# Patient Record
Sex: FEMALE | Race: WHITE | NOT HISPANIC OR LATINO | Employment: FULL TIME | URBAN - METROPOLITAN AREA
[De-identification: names, ages, dates, MRNs, and addresses within clinical notes are randomized per-mention and may not be internally consistent; named-entity substitution may affect disease eponyms.]

---

## 2017-01-17 DIAGNOSIS — E78.5 HYPERLIPIDEMIA, UNSPECIFIED: Primary | ICD-10-CM

## 2017-01-25 LAB
ALBUMIN SERPL-MCNC: 4.3 G/DL (ref 3.5–5.2)
ALBUMIN/GLOB SERPL: 1.7 G/DL
ALP SERPL-CCNC: 99 U/L (ref 39–117)
ALT SERPL-CCNC: 35 U/L (ref 1–33)
AST SERPL-CCNC: 27 U/L (ref 1–32)
BASOPHILS # BLD AUTO: 0.03 10*3/MM3 (ref 0–0.2)
BASOPHILS NFR BLD AUTO: 0.8 % (ref 0–1.5)
BILIRUB SERPL-MCNC: 0.6 MG/DL (ref 0.1–1.2)
BUN SERPL-MCNC: 12 MG/DL (ref 6–20)
BUN/CREAT SERPL: 14.6 (ref 7–25)
CALCIUM SERPL-MCNC: 9.9 MG/DL (ref 8.6–10.5)
CHLORIDE SERPL-SCNC: 103 MMOL/L (ref 98–107)
CHOLEST SERPL-MCNC: 184 MG/DL (ref 0–200)
CO2 SERPL-SCNC: 29.8 MMOL/L (ref 22–29)
CREAT SERPL-MCNC: 0.82 MG/DL (ref 0.57–1)
EOSINOPHIL # BLD AUTO: 0.09 10*3/MM3 (ref 0–0.7)
EOSINOPHIL NFR BLD AUTO: 2.3 % (ref 0.3–6.2)
ERYTHROCYTE [DISTWIDTH] IN BLOOD BY AUTOMATED COUNT: 12.9 % (ref 11.7–13)
GLOBULIN SER CALC-MCNC: 2.5 GM/DL
GLUCOSE SERPL-MCNC: 103 MG/DL (ref 65–99)
HCT VFR BLD AUTO: 45.2 % (ref 35.6–45.5)
HDLC SERPL-MCNC: 55 MG/DL (ref 40–60)
HGB BLD-MCNC: 14.6 G/DL (ref 11.9–15.5)
IMM GRANULOCYTES # BLD: 0 10*3/MM3 (ref 0–0.03)
IMM GRANULOCYTES NFR BLD: 0 % (ref 0–0.5)
LDLC SERPL CALC-MCNC: 107 MG/DL (ref 0–100)
LDLC/HDLC SERPL: 1.95 {RATIO}
LYMPHOCYTES # BLD AUTO: 1.46 10*3/MM3 (ref 0.9–4.8)
LYMPHOCYTES NFR BLD AUTO: 38 % (ref 19.6–45.3)
MCH RBC QN AUTO: 30.6 PG (ref 26.9–32)
MCHC RBC AUTO-ENTMCNC: 32.3 G/DL (ref 32.4–36.3)
MCV RBC AUTO: 94.8 FL (ref 80.5–98.2)
MONOCYTES # BLD AUTO: 0.42 10*3/MM3 (ref 0.2–1.2)
MONOCYTES NFR BLD AUTO: 10.9 % (ref 5–12)
NEUTROPHILS # BLD AUTO: 1.84 10*3/MM3 (ref 1.9–8.1)
NEUTROPHILS NFR BLD AUTO: 48 % (ref 42.7–76)
NRBC BLD AUTO-RTO: 0 /100 WBC (ref 0–0)
PLATELET # BLD AUTO: 198 10*3/MM3 (ref 140–500)
POTASSIUM SERPL-SCNC: 4.5 MMOL/L (ref 3.5–5.2)
PROT SERPL-MCNC: 6.8 G/DL (ref 6–8.5)
RBC # BLD AUTO: 4.77 10*6/MM3 (ref 3.9–5.2)
SODIUM SERPL-SCNC: 143 MMOL/L (ref 136–145)
TRIGL SERPL-MCNC: 110 MG/DL (ref 0–150)
TSH SERPL DL<=0.005 MIU/L-ACNC: 2.62 MIU/ML (ref 0.27–4.2)
VLDLC SERPL CALC-MCNC: 22 MG/DL (ref 5–40)
WBC # BLD AUTO: 3.84 10*3/MM3 (ref 4.5–10.7)

## 2017-02-01 ENCOUNTER — OFFICE VISIT (OUTPATIENT)
Dept: FAMILY MEDICINE CLINIC | Facility: CLINIC | Age: 52
End: 2017-02-01

## 2017-02-01 VITALS
OXYGEN SATURATION: 98 % | HEIGHT: 63 IN | HEART RATE: 67 BPM | BODY MASS INDEX: 26.72 KG/M2 | WEIGHT: 150.8 LBS | TEMPERATURE: 98.3 F | DIASTOLIC BLOOD PRESSURE: 70 MMHG | SYSTOLIC BLOOD PRESSURE: 110 MMHG | RESPIRATION RATE: 16 BRPM

## 2017-02-01 DIAGNOSIS — E78.5 HYPERLIPIDEMIA, UNSPECIFIED HYPERLIPIDEMIA TYPE: Primary | ICD-10-CM

## 2017-02-01 PROCEDURE — 99213 OFFICE O/P EST LOW 20 MIN: CPT | Performed by: INTERNAL MEDICINE

## 2017-02-01 RX ORDER — NAPROXEN 375 MG/1
TABLET ORAL
COMMUNITY
Start: 2016-11-04 | End: 2017-02-01

## 2017-02-01 RX ORDER — CHLORHEXIDINE GLUCONATE 0.12 MG/ML
RINSE ORAL
COMMUNITY
Start: 2016-11-16 | End: 2017-02-01

## 2017-02-01 RX ORDER — ATORVASTATIN CALCIUM 10 MG/1
10 TABLET, FILM COATED ORAL DAILY
Qty: 90 TABLET | Refills: 3 | Status: SHIPPED | OUTPATIENT
Start: 2017-02-01 | End: 2017-06-15 | Stop reason: SDUPTHER

## 2017-02-01 NOTE — PATIENT INSTRUCTIONS
Your blood pressure is excellent.  Total cholesterol is 180/normal HDL cholesterol 55.  Your cholesterol is slightly elevated at 107.  Resting blood sugar is also slightly elevated at 103.  Discussed diet, exercise per AHA guidelines.  Continue atorvastatin 10 mg daily and follow-up annually with labs.  To get discussed increasing fiber and fluid intake for constipation per fiber sheet.  I would also like to have a copy of your next bone density test done by her gynecologist.

## 2017-02-01 NOTE — PROGRESS NOTES
Subjective   Maricarmen Hayes is a 52 y.o. female. Patient is here today for   Chief Complaint   Patient presents with   • Follow-up     labs           Vitals:    02/01/17 0941   BP: 110/70   Pulse: 67   Resp: 16   Temp: 98.3 °F (36.8 °C)   SpO2: 98%       The following portions of the patient's history were reviewed and updated as appropriate: allergies, current medications, past family history, past medical history, past social history, past surgical history and problem list.    Past Medical History   Diagnosis Date   • Abnormal Pap smear of cervix    • Anemia    • Hyperlipidemia    • Ovarian cyst    • Urinary tract infection       No Known Allergies   Social History     Social History   • Marital status:      Spouse name: N/A   • Number of children: N/A   • Years of education: N/A     Occupational History   • Not on file.     Social History Main Topics   • Smoking status: Never Smoker   • Smokeless tobacco: Never Used   • Alcohol use Yes      Comment: occasional    • Drug use: No   • Sexual activity: Yes     Partners: Male     Birth control/ protection: Surgical     Other Topics Concern   • Not on file     Social History Narrative        Current Outpatient Prescriptions:   •  atorvastatin (LIPITOR) 10 MG tablet, Take 1 tablet by mouth Daily., Disp: 90 tablet, Rfl: 3     Objective   History of Present Illness Shantanu is here today for an annual follow-up.  She has hyperlipidemia and is on atorvastatin 10 mg daily.  She feels well.  She eats very healthy and is very active.  She has a fit bit and records over 10,000 steps a day.  Her weight is unchanged in the past year.  She recently got a TDap and a flu vaccine.  She does complain of some constipation and has started taking Metamucil tablets.    Review of Systems   Constitutional: Negative for activity change and unexpected weight change.   Respiratory: Negative.    Cardiovascular: Negative.    Gastrointestinal: Positive for constipation.        Colonoscopy May  2016   Neurological: Negative.    Psychiatric/Behavioral: Negative.        Physical Exam   Constitutional: She appears well-developed and well-nourished.   Neck: Neck supple. No thyromegaly present.   Cardiovascular: Normal rate, regular rhythm and normal heart sounds.    Pulmonary/Chest: Effort normal and breath sounds normal.   Neurological: She is alert.   Psychiatric: She has a normal mood and affect.   Vitals reviewed.      ASSESSMENT     Problem List Items Addressed This Visit        Cardiovascular and Mediastinum    Hyperlipidemia - Primary    Relevant Medications    atorvastatin (LIPITOR) 10 MG tablet          PLAN  Patient Instructions   Your blood pressure is excellent.  Total cholesterol is 180/normal HDL cholesterol 55.  Your cholesterol is slightly elevated at 107.  Resting blood sugar is also slightly elevated at 103.  Discussed diet, exercise per AHA guidelines.  Continue atorvastatin 10 mg daily and follow-up annually with labs.  To get discussed increasing fiber and fluid intake for constipation per fiber sheet.  I would also like to have a copy of your next bone density test done by her gynecologist.      No Follow-up on file.

## 2017-03-21 RX ORDER — ATORVASTATIN CALCIUM 10 MG/1
TABLET, FILM COATED ORAL
Qty: 90 TABLET | Refills: 0 | Status: SHIPPED | OUTPATIENT
Start: 2017-03-21 | End: 2017-06-15 | Stop reason: SDUPTHER

## 2017-06-15 RX ORDER — ATORVASTATIN CALCIUM 10 MG/1
TABLET, FILM COATED ORAL
Qty: 90 TABLET | Refills: 0 | Status: SHIPPED | OUTPATIENT
Start: 2017-06-15 | End: 2017-09-21 | Stop reason: SDUPTHER

## 2017-07-17 ENCOUNTER — OFFICE VISIT (OUTPATIENT)
Dept: OBSTETRICS AND GYNECOLOGY | Facility: CLINIC | Age: 52
End: 2017-07-17

## 2017-07-17 VITALS
WEIGHT: 155 LBS | DIASTOLIC BLOOD PRESSURE: 70 MMHG | BODY MASS INDEX: 27.46 KG/M2 | HEIGHT: 63 IN | SYSTOLIC BLOOD PRESSURE: 110 MMHG

## 2017-07-17 DIAGNOSIS — Z12.4 PAP SMEAR FOR CERVICAL CANCER SCREENING: ICD-10-CM

## 2017-07-17 DIAGNOSIS — Z13.9 SCREENING: ICD-10-CM

## 2017-07-17 DIAGNOSIS — E78.01 FAMILIAL HYPERCHOLESTEROLEMIA: ICD-10-CM

## 2017-07-17 DIAGNOSIS — Z12.31 SCREENING MAMMOGRAM, ENCOUNTER FOR: ICD-10-CM

## 2017-07-17 DIAGNOSIS — Z01.419 WELL FEMALE EXAM WITH ROUTINE GYNECOLOGICAL EXAM: Primary | ICD-10-CM

## 2017-07-17 LAB
BILIRUB BLD-MCNC: NEGATIVE MG/DL
CLARITY, POC: CLEAR
COLOR UR: YELLOW
GLUCOSE UR STRIP-MCNC: NEGATIVE MG/DL
KETONES UR QL: NEGATIVE
LEUKOCYTE EST, POC: NEGATIVE
NITRITE UR-MCNC: NEGATIVE MG/ML
PH UR: 7 [PH] (ref 5–8)
PROT UR STRIP-MCNC: NEGATIVE MG/DL
RBC # UR STRIP: NEGATIVE /UL
SP GR UR: 1.01 (ref 1–1.03)
UROBILINOGEN UR QL: NORMAL

## 2017-07-17 PROCEDURE — 99396 PREV VISIT EST AGE 40-64: CPT | Performed by: OBSTETRICS & GYNECOLOGY

## 2017-07-17 PROCEDURE — 81002 URINALYSIS NONAUTO W/O SCOPE: CPT | Performed by: OBSTETRICS & GYNECOLOGY

## 2017-07-17 NOTE — PROGRESS NOTES
[facility]  Routine Annual Visit    2017    Patient: Maricarmen Hayes          MR#:[id number]      History of Present Illness    52 y.o. female  who presents for annual exam.    No LMP recorded (lmp unknown). Patient has had a hysterectomy.  Obstetric History:  OB History      Para Term  AB TAB SAB Ectopic Multiple Living    3 2 2  1   1  2         Menstrual History:  Menarche age: 13 years  No LMP recorded (lmp unknown). Patient has had a hysterectomy.  Period Cycle (Days): 28  Period Duration (Days): 7  Period Pattern: Regular  Menstrual Flow: Moderate  Menstrual Control: Maxi pad    Sexual History:       ________________________________________  Patient Active Problem List   Diagnosis   • Discoloration of skin of hand   • Hyperlipidemia   • Well female exam with routine gynecological exam       Past Medical History:   Diagnosis Date   • Abnormal Pap smear of cervix    • Anemia    • Hyperlipidemia        Past Surgical History:   Procedure Laterality Date   • BILATERAL SALPINGO OOPHORECTOMY  2015    Dr. Reynolds   •  SECTION     • COLONOSCOPY N/A 2016    Procedure: COLONOSCOPY;  Surgeon: Devon Campos MD;  Location: [facility] ENDOSCOPY;  Service:    • HYSTERECTOMY  2015    Dr. Reynolds   • TUBAL ABDOMINAL LIGATION         History   Smoking Status   • Never Smoker   Smokeless Tobacco   • Never Used       Family History   Problem Relation Age of Onset   • Rheum arthritis Maternal Aunt    • Hyperlipidemia Maternal Aunt    • Alzheimer's disease Paternal Grandmother    • Hyperlipidemia Maternal Grandmother    • Hyperlipidemia Mother    • Hyperlipidemia Maternal Aunt        Prior to Admission medications    Medication Sig Start Date End Date Taking? Authorizing Provider   atorvastatin (LIPITOR) 10 MG tablet TAKE ONE TABLET BY MOUTH DAILY 6/15/17   Yordy Mishra MD     ________________________________________    Current contraception: status post  "hysterectomy  History of abnormal Pap smear: yes - remote past  Family history of uterine or ovarian cancer: no  Family History of colon cancer/colon polyps: no  History of abnormal mammogram: no  History of abnormal lipids: yes - treated    The following portions of the patient's history were reviewed and updated as appropriate: allergies, current medications, past family history, past medical history, past social history, past surgical history and problem list.    Review of Systems    Pertinent items are noted in HPI.     Objective   Physical Exam    /70  Ht 63\" (160 cm)  Wt 155 lb (70.3 kg)  LMP  (LMP Unknown)  Breastfeeding? No  BMI 27.46 kg/m2   BP Readings from Last 3 Encounters:   07/17/17 110/70   02/01/17 110/70   05/24/16 122/77      Wt Readings from Last 3 Encounters:   07/17/17 155 lb (70.3 kg)   02/01/17 150 lb 12.8 oz (68.4 kg)   05/24/16 148 lb (67.1 kg)        BMI: Estimated body mass index is 27.46 kg/(m^2) as calculated from the following:    Height as of this encounter: 63\" (160 cm).    Weight as of this encounter: 155 lb (70.3 kg).    General:   alert, appears stated age and cooperative   Heart: regular rate and rhythm, S1, S2 normal, no murmur, click, rub or gallop   Lungs: clear to auscultation bilaterally   Abdomen: soft, non-tender, without masses or organomegaly   Breast: inspection negative, no nipple discharge or bleeding, no masses or nodularity palpable   Vulva: normal   Vagina: normal mucosa   Cervix: no lesions   Uterus: absent    Adnexa: normal adnexa     As part of wellness and prevention, the following topics were discussed with the patient:    []  Nutrition  [x]  Physical activity/regular exercise   [x]  Healthy weight  []  Injury prevention  []  Substance misuse/abuse  []  Sexual behavior  []  STD prevention  []  Contaception  []  Dental health  []  Mental health  []  Immunization  [x]  Encouraged SBE       Assessment:    Maricarmen was seen today for gynecologic " exam.    Diagnoses and all orders for this visit:    Well female exam with routine gynecological exam    Pap smear for cervical cancer screening  -     IgP, Aptima HPV    Screening  -     POC Urinalysis Dipstick    Familial hypercholesterolemia    Screening mammogram, encounter for  -     Mammo Screening Bilateral With CAD; Future          Plan:    []  Rx:   [x]  Mammogram request made  [x]  PAP done  []  Occult fecal blood test ([employer])  []  Labs:   []  GC/Chl/TV  []  DEXA scan   [x]  Colonoscopy to date            Vern Reynolds MD  7/17/2017 11:56 AM

## 2017-07-20 ENCOUNTER — TELEPHONE (OUTPATIENT)
Dept: OBSTETRICS AND GYNECOLOGY | Facility: CLINIC | Age: 52
End: 2017-07-20

## 2017-07-20 LAB
CYTOLOGIST CVX/VAG CYTO: NORMAL
CYTOLOGY CVX/VAG DOC THIN PREP: NORMAL
DX ICD CODE: NORMAL
HIV 1 & 2 AB SER-IMP: NORMAL
HPV I/H RISK 4 DNA CVX QL PROBE+SIG AMP: NEGATIVE
OTHER STN SPEC: NORMAL
PATH REPORT.FINAL DX SPEC: NORMAL
STAT OF ADQ CVX/VAG CYTO-IMP: NORMAL

## 2017-07-20 NOTE — TELEPHONE ENCOUNTER
----- Message from Vern Reynolds MD sent at 7/20/2017  8:26 AM EDT -----  Call pt:  PAP is negative.

## 2017-08-29 ENCOUNTER — TELEPHONE (OUTPATIENT)
Dept: OBSTETRICS AND GYNECOLOGY | Facility: CLINIC | Age: 52
End: 2017-08-29

## 2017-08-30 ENCOUNTER — HOSPITAL ENCOUNTER (OUTPATIENT)
Dept: MAMMOGRAPHY | Facility: HOSPITAL | Age: 52
Discharge: HOME | End: 2017-08-30
Attending: OBSTETRICS & GYNECOLOGY | Admitting: OBSTETRICS & GYNECOLOGY

## 2017-08-30 DIAGNOSIS — Z12.31 SCREENING MAMMOGRAM, ENCOUNTER FOR: ICD-10-CM

## 2017-08-30 PROCEDURE — G0202 SCR MAMMO BI INCL CAD: HCPCS

## 2017-09-08 ENCOUNTER — TELEPHONE (OUTPATIENT)
Dept: OBSTETRICS AND GYNECOLOGY | Facility: CLINIC | Age: 52
End: 2017-09-08

## 2017-09-08 NOTE — TELEPHONE ENCOUNTER
----- Message from Vern Reynolds MD sent at 9/7/2017  9:49 PM EDT -----  Call patient: Normal mammogram

## 2017-09-21 RX ORDER — ATORVASTATIN CALCIUM 10 MG/1
TABLET, FILM COATED ORAL
Qty: 90 TABLET | Refills: 0 | Status: SHIPPED | OUTPATIENT
Start: 2017-09-21 | End: 2017-12-27 | Stop reason: SDUPTHER

## 2017-12-27 RX ORDER — ATORVASTATIN CALCIUM 10 MG/1
TABLET, FILM COATED ORAL
Qty: 30 TABLET | Refills: 0 | Status: SHIPPED | OUTPATIENT
Start: 2017-12-27 | End: 2018-02-01 | Stop reason: SDUPTHER

## 2018-01-24 DIAGNOSIS — R73.01 ELEVATED FASTING GLUCOSE: ICD-10-CM

## 2018-01-24 DIAGNOSIS — E78.5 HYPERLIPIDEMIA, UNSPECIFIED HYPERLIPIDEMIA TYPE: ICD-10-CM

## 2018-01-24 DIAGNOSIS — Z11.59 NEED FOR HEPATITIS C SCREENING TEST: Primary | ICD-10-CM

## 2018-01-26 LAB
ALBUMIN SERPL-MCNC: 4.4 G/DL (ref 3.5–5.2)
ALBUMIN/GLOB SERPL: 1.6 G/DL
ALP SERPL-CCNC: 94 U/L (ref 39–117)
ALT SERPL-CCNC: 22 U/L (ref 1–33)
AST SERPL-CCNC: 21 U/L (ref 1–32)
BASOPHILS # BLD AUTO: 0.01 10*3/MM3 (ref 0–0.2)
BASOPHILS NFR BLD AUTO: 0.3 % (ref 0–1.5)
BILIRUB SERPL-MCNC: 0.7 MG/DL (ref 0.1–1.2)
BUN SERPL-MCNC: 14 MG/DL (ref 6–20)
BUN/CREAT SERPL: 16.7 (ref 7–25)
CALCIUM SERPL-MCNC: 9.9 MG/DL (ref 8.6–10.5)
CHLORIDE SERPL-SCNC: 99 MMOL/L (ref 98–107)
CHOLEST SERPL-MCNC: 170 MG/DL (ref 0–200)
CO2 SERPL-SCNC: 28.9 MMOL/L (ref 22–29)
CREAT SERPL-MCNC: 0.84 MG/DL (ref 0.57–1)
DIFFERENTIAL COMMENT: NORMAL
EOSINOPHIL # BLD AUTO: 0.07 10*3/MM3 (ref 0–0.7)
EOSINOPHIL NFR BLD AUTO: 2 % (ref 0.3–6.2)
ERYTHROCYTE [DISTWIDTH] IN BLOOD BY AUTOMATED COUNT: 12.9 % (ref 11.7–13)
GFR SERPLBLD CREATININE-BSD FMLA CKD-EPI: 71 ML/MIN/1.73
GFR SERPLBLD CREATININE-BSD FMLA CKD-EPI: 86 ML/MIN/1.73
GLOBULIN SER CALC-MCNC: 2.8 GM/DL
GLUCOSE SERPL-MCNC: 105 MG/DL (ref 65–99)
HCT VFR BLD AUTO: 44.7 % (ref 35.6–45.5)
HCV AB S/CO SERPL IA: 0.2 S/CO RATIO (ref 0–0.9)
HCV AB SERPL QL IA: NORMAL
HDLC SERPL-MCNC: 54 MG/DL (ref 40–60)
HGB BLD-MCNC: 14.8 G/DL (ref 11.9–15.5)
IMM GRANULOCYTES # BLD: 0 10*3/MM3 (ref 0–0.03)
IMM GRANULOCYTES NFR BLD: 0 % (ref 0–0.5)
LDLC SERPL CALC-MCNC: 92 MG/DL (ref 0–100)
LDLC/HDLC SERPL: 1.71 {RATIO}
LYMPHOCYTES # BLD AUTO: 1.42 10*3/MM3 (ref 0.9–4.8)
LYMPHOCYTES NFR BLD AUTO: 40.5 % (ref 19.6–45.3)
MCH RBC QN AUTO: 30.8 PG (ref 26.9–32)
MCHC RBC AUTO-ENTMCNC: 33.1 G/DL (ref 32.4–36.3)
MCV RBC AUTO: 93.1 FL (ref 80.5–98.2)
MONOCYTES # BLD AUTO: 0.39 10*3/MM3 (ref 0.2–1.2)
MONOCYTES NFR BLD AUTO: 11.1 % (ref 5–12)
NEUTROPHILS # BLD AUTO: 1.62 10*3/MM3 (ref 1.9–8.1)
NEUTROPHILS NFR BLD AUTO: 46.1 % (ref 42.7–76)
NRBC BLD AUTO-RTO: 0 /100 WBC (ref 0–0)
PLATELET # BLD AUTO: 204 10*3/MM3 (ref 140–500)
PLATELET BLD QL SMEAR: NORMAL
POTASSIUM SERPL-SCNC: 4.6 MMOL/L (ref 3.5–5.2)
PROT SERPL-MCNC: 7.2 G/DL (ref 6–8.5)
RBC # BLD AUTO: 4.8 10*6/MM3 (ref 3.9–5.2)
RBC MORPH BLD: NORMAL
SODIUM SERPL-SCNC: 142 MMOL/L (ref 136–145)
TRIGL SERPL-MCNC: 118 MG/DL (ref 0–150)
VLDLC SERPL CALC-MCNC: 23.6 MG/DL (ref 5–40)
WBC # BLD AUTO: 3.51 10*3/MM3 (ref 4.5–10.7)

## 2018-02-01 ENCOUNTER — OFFICE VISIT (OUTPATIENT)
Dept: FAMILY MEDICINE CLINIC | Facility: CLINIC | Age: 53
End: 2018-02-01

## 2018-02-01 VITALS
OXYGEN SATURATION: 99 % | SYSTOLIC BLOOD PRESSURE: 122 MMHG | HEIGHT: 63 IN | WEIGHT: 154.2 LBS | DIASTOLIC BLOOD PRESSURE: 76 MMHG | HEART RATE: 79 BPM | BODY MASS INDEX: 27.32 KG/M2

## 2018-02-01 DIAGNOSIS — R73.01 ELEVATED FASTING BLOOD SUGAR: ICD-10-CM

## 2018-02-01 DIAGNOSIS — Z78.0 POST-MENOPAUSAL: ICD-10-CM

## 2018-02-01 DIAGNOSIS — E78.01 FAMILIAL HYPERCHOLESTEROLEMIA: Primary | ICD-10-CM

## 2018-02-01 DIAGNOSIS — D72.819 LEUKOPENIA, UNSPECIFIED TYPE: ICD-10-CM

## 2018-02-01 PROCEDURE — 99214 OFFICE O/P EST MOD 30 MIN: CPT | Performed by: INTERNAL MEDICINE

## 2018-02-01 RX ORDER — ATORVASTATIN CALCIUM 10 MG/1
10 TABLET, FILM COATED ORAL DAILY
Qty: 90 TABLET | Refills: 3 | Status: SHIPPED | OUTPATIENT
Start: 2018-02-01 | End: 2019-02-12 | Stop reason: SDUPTHER

## 2018-02-01 NOTE — PATIENT INSTRUCTIONS
Blood pressure is normal.  Total cholesterol is 170.  HDL is 54 and LDL is 92.  Fasting blood sugar is mildly elevated at 105.  White blood cell count is decreased at 3.51.  We'll additionally check a B12 level as your white cell count last year was a little low as well.  Discussed doing cardiovascular exercise.  Will obtain a bone density test.

## 2018-02-01 NOTE — PROGRESS NOTES
Subjective   Maricarmen Hayes is a 53 y.o. female. Patient is here today for   Chief Complaint   Patient presents with   • Hyperlipidemia     lab follow up          Vitals:    02/01/18 1429   BP: 122/76   Pulse: 79   SpO2: 99%       The following portions of the patient's history were reviewed and updated as appropriate: allergies, current medications, past family history, past medical history, past social history, past surgical history and problem list.    Past Medical History:   Diagnosis Date   • Abnormal Pap smear of cervix    • Anemia    • Fibrocystic breast    • Hyperlipidemia       No Known Allergies   Social History     Social History   • Marital status:      Spouse name: Ralph   • Number of children: 2   • Years of education: 14     Occupational History   • sales      Social History Main Topics   • Smoking status: Never Smoker   • Smokeless tobacco: Never Used   • Alcohol use Yes      Comment: occasional    • Drug use: No   • Sexual activity: Yes     Partners: Male     Birth control/ protection: Surgical     Other Topics Concern   • Not on file     Social History Narrative        Current Outpatient Prescriptions:   •  atorvastatin (LIPITOR) 10 MG tablet, Take 1 tablet by mouth Daily., Disp: 90 tablet, Rfl: 3     Objective   History of Present Illness Maricarmen is here for an annual lab follow-up.  She has hyperlipidemia and is on atorvastatin 10 mg daily.  She eats healthy and started exercising.  She does not do any cardiovascular exercise.  Her weight is up 4 pounds from 1 year ago.  He sees her gynecologist annually.  She has had a complete hysterectomy and has not had a bone density test.  She did have a normal vascular screening in 2016.    Review of Systems   Constitutional: Positive for activity change (started yoga).   Respiratory: Negative.    Cardiovascular: Negative.    Gastrointestinal:        Colonoscopy 2016   Genitourinary:        GYN annually   Neurological: Negative.     Psychiatric/Behavioral: Negative.        Physical Exam   Constitutional: She appears well-developed and well-nourished.   Cardiovascular: Normal rate, regular rhythm and normal heart sounds.    Pulmonary/Chest: Effort normal and breath sounds normal.   Neurological: She is alert.   Psychiatric: She has a normal mood and affect.       ASSESSMENT     Problem List Items Addressed This Visit        Cardiovascular and Mediastinum    Hyperlipidemia - Primary    Relevant Medications    atorvastatin (LIPITOR) 10 MG tablet       Endocrine    Elevated fasting blood sugar       Immune and Lymphatic    Leukopenia      Other Visit Diagnoses     Post-menopausal        Relevant Orders    DEXA Bone Density Axial          PLAN  Patient Instructions   Blood pressure is normal.  Total cholesterol is 170.  HDL is 54 and LDL is 92.  Fasting blood sugar is mildly elevated at 105.  White blood cell count is decreased at 3.51.  We'll additionally check a B12 level as your white cell count last year was a little low as well.  Discussed doing cardiovascular exercise.  Will obtain a bone density test.      Return in about 6 months (around 8/1/2018) for labs CBC, BMP, A1c.

## 2018-02-02 LAB
FOLATE SERPL-MCNC: >20 NG/ML (ref 4.78–24.2)
Lab: NORMAL
VIT B12 SERPL-MCNC: >2000 PG/ML (ref 211–946)
WRITTEN AUTHORIZATION: NORMAL

## 2018-03-07 ENCOUNTER — HOSPITAL ENCOUNTER (OUTPATIENT)
Dept: BONE DENSITY | Facility: HOSPITAL | Age: 53
Discharge: HOME | End: 2018-03-07
Admitting: INTERNAL MEDICINE

## 2018-03-07 PROCEDURE — 77080 DXA BONE DENSITY AXIAL: CPT

## 2018-07-27 ENCOUNTER — TRANSCRIBE ORDERS (OUTPATIENT)
Dept: ADMINISTRATIVE | Facility: HOSPITAL | Age: 53
End: 2018-07-27

## 2018-07-27 DIAGNOSIS — Z12.39 BREAST SCREENING: Primary | ICD-10-CM

## 2018-09-07 ENCOUNTER — HOSPITAL ENCOUNTER (OUTPATIENT)
Dept: MAMMOGRAPHY | Facility: HOSPITAL | Age: 53
Discharge: HOME | End: 2018-09-07
Attending: OBSTETRICS & GYNECOLOGY | Admitting: OBSTETRICS & GYNECOLOGY

## 2018-09-07 DIAGNOSIS — Z12.39 BREAST SCREENING: ICD-10-CM

## 2018-09-07 PROCEDURE — 77067 SCR MAMMO BI INCL CAD: CPT

## 2018-09-12 ENCOUNTER — TELEPHONE (OUTPATIENT)
Dept: OBSTETRICS AND GYNECOLOGY | Age: 53
End: 2018-09-12

## 2018-09-12 NOTE — TELEPHONE ENCOUNTER
----- Message from Vern Reynolds MD sent at 9/11/2018  9:35 PM EDT -----  Call patient: Normal mammogram

## 2018-11-30 ENCOUNTER — OFFICE VISIT (OUTPATIENT)
Dept: OBSTETRICS AND GYNECOLOGY | Age: 53
End: 2018-11-30

## 2018-11-30 VITALS
WEIGHT: 154 LBS | DIASTOLIC BLOOD PRESSURE: 84 MMHG | SYSTOLIC BLOOD PRESSURE: 132 MMHG | BODY MASS INDEX: 27.29 KG/M2 | HEIGHT: 63 IN

## 2018-11-30 DIAGNOSIS — Z12.4 PAP SMEAR FOR CERVICAL CANCER SCREENING: ICD-10-CM

## 2018-11-30 DIAGNOSIS — R68.82 DECREASED LIBIDO: ICD-10-CM

## 2018-11-30 DIAGNOSIS — Z13.89 SCREENING FOR BLOOD OR PROTEIN IN URINE: ICD-10-CM

## 2018-11-30 DIAGNOSIS — Z01.419 WELL FEMALE EXAM WITH ROUTINE GYNECOLOGICAL EXAM: Primary | ICD-10-CM

## 2018-11-30 LAB
BILIRUB BLD-MCNC: NEGATIVE MG/DL
CLARITY, POC: CLEAR
COLOR UR: YELLOW
GLUCOSE UR STRIP-MCNC: NEGATIVE MG/DL
KETONES UR QL: NEGATIVE
LEUKOCYTE EST, POC: NEGATIVE
NITRITE UR-MCNC: NEGATIVE MG/ML
PH UR: 5.5 [PH] (ref 5–8)
PROT UR STRIP-MCNC: NEGATIVE MG/DL
RBC # UR STRIP: NEGATIVE /UL
SP GR UR: 1 (ref 1–1.03)
UROBILINOGEN UR QL: NORMAL

## 2018-11-30 PROCEDURE — 99396 PREV VISIT EST AGE 40-64: CPT | Performed by: OBSTETRICS & GYNECOLOGY

## 2018-11-30 PROCEDURE — 81002 URINALYSIS NONAUTO W/O SCOPE: CPT | Performed by: OBSTETRICS & GYNECOLOGY

## 2018-11-30 RX ORDER — INFLUENZA VIRUS VACCINE 15; 15; 15; 15 UG/.5ML; UG/.5ML; UG/.5ML; UG/.5ML
SUSPENSION INTRAMUSCULAR
COMMUNITY
Start: 2018-10-25 | End: 2018-11-30

## 2018-11-30 NOTE — PROGRESS NOTES
Routine Annual Visit    2018    Patient: Maricarmen Hayes          MR#:[id number]      History of Present Illness    Chief Complaint   Patient presents with   • Gynecologic Exam     Annual.  Last pap 17, negative. Last mammo 18, negative. Last colonoscopy 16, 10 yr. f/u.  Dexa 3/7/18, mild osteopenia.        53 y.o. female  who presents for annual exam.    Patient presents for routine annual exam complaining of exceptionally poor libido and asking for solutions.  The patient's had previous salpingo-oophorectomy and we discussed testosterone supplementation    No LMP recorded (lmp unknown). Patient has had a hysterectomy.  Obstetric History:  OB History      Para Term  AB Living    3 2 2   1 2    SAB TAB Ectopic Molar Multiple Live Births        1     2         Menstrual History:     No LMP recorded (lmp unknown). Patient has had a hysterectomy.       Sexual History:       ________________________________________  Patient Active Problem List   Diagnosis   • Hyperlipidemia   • Well female exam with routine gynecological exam   • Leukopenia   • Elevated fasting blood sugar       Past Medical History:   Diagnosis Date   • Abnormal Pap smear of cervix    • Anemia    • Fibrocystic breast    • Hyperlipidemia        Past Surgical History:   Procedure Laterality Date   • BILATERAL SALPINGO OOPHORECTOMY  2015    Dr. Reynolds   •  SECTION     • OOPHORECTOMY     • SUPRACERVICAL HYSTERECTOMY SALPINGO OOPHORECTOMY  2015    supracervical lázaro/ Rodolfo    • TUBAL ABDOMINAL LIGATION         Social History     Tobacco Use   Smoking Status Never Smoker   Smokeless Tobacco Never Used       Family History   Problem Relation Age of Onset   • Rheum arthritis Maternal Aunt    • Hyperlipidemia Maternal Aunt    • Ovarian cancer Maternal Aunt    • Alzheimer's disease Paternal Grandmother    • Hyperlipidemia Maternal Grandmother    • Hyperlipidemia Mother    • Ovarian cancer  "Mother    • Hyperlipidemia Maternal Aunt    • Ovarian cancer Maternal Aunt        Prior to Admission medications    Medication Sig Start Date End Date Taking? Authorizing Provider   atorvastatin (LIPITOR) 10 MG tablet Take 1 tablet by mouth Daily. 2/1/18  Yes Yordy Mishra MD   FLUARIX QUADRIVALENT 0.5 ML suspension prefilled syringe injection  10/25/18  Yes Provider, MD Shante     ________________________________________    Current contraception: status post hysterectomy  History of abnormal Pap smear: no  Family history of uterine or ovarian cancer: no  Family History of colon cancer/colon polyps: no  History of abnormal mammogram: no  History of abnormal lipids: yes - treated    The following portions of the patient's history were reviewed and updated as appropriate: allergies, current medications, past family history, past medical history, past social history, past surgical history and problem list.    Review of Systems    Pertinent items are noted in HPI.     Objective   Physical Exam    /84   Ht 160 cm (63\")   Wt 69.9 kg (154 lb)   LMP  (LMP Unknown)   Breastfeeding? No   BMI 27.28 kg/m²    BP Readings from Last 3 Encounters:   11/30/18 132/84   02/01/18 122/76   07/17/17 110/70      Wt Readings from Last 3 Encounters:   11/30/18 69.9 kg (154 lb)   02/01/18 69.9 kg (154 lb 3.2 oz)   07/17/17 70.3 kg (155 lb)        BMI: Estimated body mass index is 27.28 kg/m² as calculated from the following:    Height as of this encounter: 160 cm (63\").    Weight as of this encounter: 69.9 kg (154 lb).       General: alert, appears stated age and cooperative   Heart: regular rate and rhythm, S1, S2 normal, no murmur, click, rub or gallop   Lungs: clear to auscultation bilaterally   Abdomen: soft, non-tender, without masses or organomegaly   Breast: inspection negative, no nipple discharge or bleeding, no masses or nodularity palpable   Vulva: normal, bartholin's, Urethra, Daniel's normal and moderate " atrophy   Vagina: normal mucosa, atrophic appearance    Cervix: no lesions   Uterus: absent    Adnexa: exam limited by habitus     As part of wellness and prevention, the following topics were discussed with the patient:     []  Nutrition  []  Physical activity/regular exercise   []  Healthy weight  []  Injury prevention  []  Smoking cessation  []  Substance misuse/abuse  []  Sexual behavior  []  STD prevention  []  Contaception  []  Dental health  []  Mental health  []  Immunization  [x]  Encouraged SBE        Assessment:    Maricarmen was seen today for gynecologic exam.    Diagnoses and all orders for this visit:    Well female exam with routine gynecological exam  -     IgP, Aptima HPV    Screening for blood or protein in urine  -     POC Urinalysis Dipstick    Pap smear for cervical cancer screening  -     IgP, Aptima HPV    Decreased libido    Suggest topical testosterone.  Instructions given an order sent to [facility] care    Plan:  Return in about 1 year (around 11/30/2019) for Annual GYN exam.      Vern Reynolds MD  11/30/2018 2:07 PM

## 2018-12-04 ENCOUNTER — TELEPHONE (OUTPATIENT)
Dept: OBSTETRICS AND GYNECOLOGY | Age: 53
End: 2018-12-04

## 2018-12-04 LAB
CYTOLOGIST CVX/VAG CYTO: NORMAL
CYTOLOGY CVX/VAG DOC THIN PREP: NORMAL
DX ICD CODE: NORMAL
HIV 1 & 2 AB SER-IMP: NORMAL
HPV I/H RISK 4 DNA CVX QL PROBE+SIG AMP: NEGATIVE
Lab: NORMAL
OTHER STN SPEC: NORMAL
PATH REPORT.FINAL DX SPEC: NORMAL
STAT OF ADQ CVX/VAG CYTO-IMP: NORMAL

## 2018-12-04 NOTE — TELEPHONE ENCOUNTER
----- Message from Vern Reynolds MD sent at 12/4/2018  2:54 PM EST -----  Call pt:  PAP is negative.

## 2019-01-18 DIAGNOSIS — R73.01 ELEVATED FASTING BLOOD SUGAR: ICD-10-CM

## 2019-01-18 DIAGNOSIS — E78.01 FAMILIAL HYPERCHOLESTEROLEMIA: ICD-10-CM

## 2019-01-24 LAB
ALBUMIN SERPL-MCNC: 4.5 G/DL (ref 3.5–5.2)
ALBUMIN/GLOB SERPL: 1.6 G/DL
ALP SERPL-CCNC: 118 U/L (ref 39–117)
ALT SERPL-CCNC: 69 U/L (ref 1–33)
APPEARANCE UR: CLEAR
AST SERPL-CCNC: 49 U/L (ref 1–32)
BACTERIA #/AREA URNS HPF: ABNORMAL /HPF
BASOPHILS # BLD AUTO: 0.01 10*3/MM3 (ref 0–0.2)
BASOPHILS NFR BLD AUTO: 0.3 % (ref 0–1.5)
BILIRUB SERPL-MCNC: 0.6 MG/DL (ref 0.1–1.2)
BILIRUB UR QL STRIP: NEGATIVE
BUN SERPL-MCNC: 14 MG/DL (ref 6–20)
BUN/CREAT SERPL: 16.7 (ref 7–25)
CALCIUM SERPL-MCNC: 10.1 MG/DL (ref 8.6–10.5)
CASTS URNS MICRO: ABNORMAL
CHLORIDE SERPL-SCNC: 103 MMOL/L (ref 98–107)
CHOLEST SERPL-MCNC: 202 MG/DL (ref 0–200)
CO2 SERPL-SCNC: 29.7 MMOL/L (ref 22–29)
COLOR UR: YELLOW
CREAT SERPL-MCNC: 0.84 MG/DL (ref 0.57–1)
EOSINOPHIL # BLD AUTO: 0.13 10*3/MM3 (ref 0–0.7)
EOSINOPHIL NFR BLD AUTO: 3.3 % (ref 0.3–6.2)
EPI CELLS #/AREA URNS HPF: ABNORMAL /HPF
ERYTHROCYTE [DISTWIDTH] IN BLOOD BY AUTOMATED COUNT: 13 % (ref 11.7–13)
GLOBULIN SER CALC-MCNC: 2.9 GM/DL
GLUCOSE SERPL-MCNC: 105 MG/DL (ref 65–99)
GLUCOSE UR QL: NEGATIVE
HCT VFR BLD AUTO: 45.8 % (ref 35.6–45.5)
HDLC SERPL-MCNC: 52 MG/DL (ref 40–60)
HGB BLD-MCNC: 15.1 G/DL (ref 11.9–15.5)
HGB UR QL STRIP: NEGATIVE
IMM GRANULOCYTES # BLD AUTO: 0.01 10*3/MM3 (ref 0–0.03)
IMM GRANULOCYTES NFR BLD AUTO: 0.3 % (ref 0–0.5)
KETONES UR QL STRIP: NEGATIVE
LDLC SERPL CALC-MCNC: 127 MG/DL (ref 0–100)
LDLC/HDLC SERPL: 2.43 {RATIO}
LEUKOCYTE ESTERASE UR QL STRIP: (no result)
LYMPHOCYTES # BLD AUTO: 1.28 10*3/MM3 (ref 0.9–4.8)
LYMPHOCYTES NFR BLD AUTO: 32.3 % (ref 19.6–45.3)
MCH RBC QN AUTO: 31.3 PG (ref 26.9–32)
MCHC RBC AUTO-ENTMCNC: 33 G/DL (ref 32.4–36.3)
MCV RBC AUTO: 94.8 FL (ref 80.5–98.2)
MONOCYTES # BLD AUTO: 0.4 10*3/MM3 (ref 0.2–1.2)
MONOCYTES NFR BLD AUTO: 10.1 % (ref 5–12)
NEUTROPHILS # BLD AUTO: 2.14 10*3/MM3 (ref 1.9–8.1)
NEUTROPHILS NFR BLD AUTO: 54 % (ref 42.7–76)
NITRITE UR QL STRIP: NEGATIVE
PH UR STRIP: 6 [PH] (ref 5–8)
PLATELET # BLD AUTO: 204 10*3/MM3 (ref 140–500)
POTASSIUM SERPL-SCNC: 4.7 MMOL/L (ref 3.5–5.2)
PROT SERPL-MCNC: 7.4 G/DL (ref 6–8.5)
PROT UR QL STRIP: NEGATIVE
RBC # BLD AUTO: 4.83 10*6/MM3 (ref 3.9–5.2)
RBC #/AREA URNS HPF: ABNORMAL /HPF
SODIUM SERPL-SCNC: 144 MMOL/L (ref 136–145)
SP GR UR: 1.02 (ref 1–1.03)
TRIGL SERPL-MCNC: 117 MG/DL (ref 0–150)
TSH SERPL DL<=0.005 MIU/L-ACNC: 3.91 MIU/ML (ref 0.27–4.2)
UROBILINOGEN UR STRIP-MCNC: (no result) MG/DL
VLDLC SERPL CALC-MCNC: 23.4 MG/DL (ref 5–40)
WBC # BLD AUTO: 3.96 10*3/MM3 (ref 4.5–10.7)
WBC #/AREA URNS HPF: ABNORMAL /HPF

## 2019-01-31 ENCOUNTER — OFFICE VISIT (OUTPATIENT)
Dept: FAMILY MEDICINE CLINIC | Facility: CLINIC | Age: 54
End: 2019-01-31

## 2019-01-31 VITALS
DIASTOLIC BLOOD PRESSURE: 80 MMHG | WEIGHT: 157 LBS | RESPIRATION RATE: 18 BRPM | HEIGHT: 63 IN | BODY MASS INDEX: 27.82 KG/M2 | HEART RATE: 72 BPM | SYSTOLIC BLOOD PRESSURE: 138 MMHG

## 2019-01-31 DIAGNOSIS — D72.819 LEUKOPENIA, UNSPECIFIED TYPE: ICD-10-CM

## 2019-01-31 DIAGNOSIS — R73.01 ELEVATED FASTING BLOOD SUGAR: ICD-10-CM

## 2019-01-31 DIAGNOSIS — E78.01 FAMILIAL HYPERCHOLESTEROLEMIA: Primary | ICD-10-CM

## 2019-01-31 PROCEDURE — 99214 OFFICE O/P EST MOD 30 MIN: CPT | Performed by: INTERNAL MEDICINE

## 2019-01-31 NOTE — PROGRESS NOTES
Subjective   Maricarmen Hayes is a 54 y.o. female. Patient is here today for   Chief Complaint   Patient presents with   • Hyperlipidemia          Vitals:    01/31/19 0857   BP: 138/80   Pulse: 72   Resp: 18       The following portions of the patient's history were reviewed and updated as appropriate: allergies, current medications, past family history, past medical history, past social history, past surgical history and problem list.    Past Medical History:   Diagnosis Date   • Abnormal Pap smear of cervix    • Anemia    • Fibrocystic breast    • Hyperlipidemia       No Known Allergies   Social History     Socioeconomic History   • Marital status:      Spouse name: Ralph   • Number of children: 2   • Years of education: 14   • Highest education level: Not on file   Social Needs   • Financial resource strain: Not on file   • Food insecurity - worry: Not on file   • Food insecurity - inability: Not on file   • Transportation needs - medical: Not on file   • Transportation needs - non-medical: Not on file   Occupational History   • Occupation: sales   Tobacco Use   • Smoking status: Never Smoker   • Smokeless tobacco: Never Used   Substance and Sexual Activity   • Alcohol use: Yes     Comment: occasional    • Drug use: No   • Sexual activity: Yes     Partners: Male     Birth control/protection: Surgical     Comment: HYSTERECTOMY  2015   Other Topics Concern   • Not on file   Social History Narrative   • Not on file        Current Outpatient Medications:   •  atorvastatin (LIPITOR) 10 MG tablet, Take 1 tablet by mouth Daily., Disp: 90 tablet, Rfl: 3     Objective   History of Present Illness Maricarmen is here for blood pressure check and lab follow-up.  She has hyperlipidemia and is on atorvastatin 10 mg daily.  She also has elevated fasting blood sugar a mild leukopenia.  She eats healthy and does exercise some.  Her blood pressure was high a few months ago and she has been monitoring it at home and occasionally at  [employer].  She gets low readings after taking a bath and high readings at other times.  She complains of bloodshot eyes and has seen her eye doctor.  She also complains of some lower back pain with some radiation into her leg.  She has seen orthopedic surgery.    Review of Systems   Constitutional: Negative for activity change and unexpected weight change.   Respiratory: Negative.    Cardiovascular:        BP 90's-130's/55-88   Genitourinary: Negative for dysuria, frequency and urgency.   Musculoskeletal: Positive for back pain.        As in HPI   Neurological: Negative for weakness and numbness.       Physical Exam   Constitutional: She appears well-developed and well-nourished.   Neck: Carotid bruit is not present.   Cardiovascular: Normal rate, regular rhythm and normal heart sounds.   146/90   Pulmonary/Chest: Effort normal and breath sounds normal.   Psychiatric: She has a normal mood and affect. Her behavior is normal. Judgment and thought content normal.   Vitals reviewed.      ASSESSMENT     Problem List Items Addressed This Visit        Cardiovascular and Mediastinum    Hyperlipidemia - Primary       Immune and Lymphatic    Leukopenia       Other    Elevated fasting blood sugar          PLAN  Patient Instructions   Blood pressure is high today.  Discussed monitoring it correctly at home and do not check it after taking a hot bath.  We'll review blood pressure record in one month and initiate treatment if indicated.  Fasting blood sugar is mildly increased at 105.  Cholesterol is high at 202 and LDL cholesterol has increased 30 points from 6 months ago.  Liver enzymes are mildly elevated.  We'll plan on rechecking a comprehensive metabolic panel in 1 month.  Thyroid-stimulating hormone level is normal.  Urinalysis shows 2+ leukocytes.  We'll repeat urinalysis and do a culture if indicated.  Discussed diet and exercise.      Return in about 1 month (around 2/28/2019) for Recheck.

## 2019-01-31 NOTE — PATIENT INSTRUCTIONS
Blood pressure is high today.  Discussed monitoring it correctly at home and do not check it after taking a hot bath.  We'll review blood pressure record in one month and initiate treatment if indicated.  Fasting blood sugar is mildly increased at 105.  Cholesterol is high at 202 and LDL cholesterol has increased 30 points from 6 months ago.  Liver enzymes are mildly elevated.  We'll plan on rechecking a comprehensive metabolic panel in 1 month.  Thyroid-stimulating hormone level is normal.  Urinalysis shows 2+ leukocytes.  We'll repeat urinalysis and do a culture if indicated.  Discussed diet and exercise.

## 2019-02-12 RX ORDER — ATORVASTATIN CALCIUM 10 MG/1
TABLET, FILM COATED ORAL
Qty: 90 TABLET | Refills: 2 | Status: SHIPPED | OUTPATIENT
Start: 2019-02-12 | End: 2019-04-01

## 2019-03-06 ENCOUNTER — OFFICE VISIT (OUTPATIENT)
Dept: FAMILY MEDICINE CLINIC | Facility: CLINIC | Age: 54
End: 2019-03-06

## 2019-03-06 VITALS
RESPIRATION RATE: 16 BRPM | BODY MASS INDEX: 27.64 KG/M2 | DIASTOLIC BLOOD PRESSURE: 80 MMHG | SYSTOLIC BLOOD PRESSURE: 118 MMHG | HEIGHT: 63 IN | WEIGHT: 156 LBS | HEART RATE: 84 BPM

## 2019-03-06 DIAGNOSIS — E78.01 FAMILIAL HYPERCHOLESTEROLEMIA: ICD-10-CM

## 2019-03-06 DIAGNOSIS — R03.0 ELEVATED BLOOD PRESSURE READING IN OFFICE WITHOUT DIAGNOSIS OF HYPERTENSION: Primary | ICD-10-CM

## 2019-03-06 PROCEDURE — 99213 OFFICE O/P EST LOW 20 MIN: CPT | Performed by: INTERNAL MEDICINE

## 2019-03-06 NOTE — PATIENT INSTRUCTIONS
"Blood pressure is high when I check it but normal otherwise.  You have \"white coat hypertension\".  Agree with starting an exercise program.  Continue to monitor blood pressure periodically at home.  Will recheck lipids in July.  "

## 2019-03-06 NOTE — PROGRESS NOTES
Subjective   Maricarmen Hayes is a 54 y.o. female. Patient is here today for   Chief Complaint   Patient presents with   • Hypertension     1 month follow up on BP           Vitals:    03/06/19 0858   BP: 118/80   Pulse: 84   Resp: 16     The following portions of the patient's history were reviewed and updated as appropriate: allergies, current medications, past family history, past medical history, past social history, past surgical history and problem list.    Past Medical History:   Diagnosis Date   • Abnormal Pap smear of cervix    • Anemia    • Fibrocystic breast    • Hyperlipidemia       No Known Allergies   Social History     Socioeconomic History   • Marital status:      Spouse name: Ralph   • Number of children: 2   • Years of education: 14   • Highest education level: Not on file   Social Needs   • Financial resource strain: Not on file   • Food insecurity - worry: Not on file   • Food insecurity - inability: Not on file   • Transportation needs - medical: Not on file   • Transportation needs - non-medical: Not on file   Occupational History   • Occupation: sales   Tobacco Use   • Smoking status: Never Smoker   • Smokeless tobacco: Never Used   Substance and Sexual Activity   • Alcohol use: Yes     Comment: occasional    • Drug use: No   • Sexual activity: Yes     Partners: Male     Birth control/protection: Surgical     Comment: HYSTERECTOMY  2015   Other Topics Concern   • Not on file   Social History Narrative   • Not on file        Current Outpatient Medications:   •  atorvastatin (LIPITOR) 10 MG tablet, TAKE ONE TABLET BY MOUTH DAILY, Disp: 90 tablet, Rfl: 2     Objective     History of Present Illness Maricarmen was seen 1 month ago for lab follow-up.  She has hyperlipidemia and was noted to have high blood pressure reading.  Since then she has been monitoring her blood pressure closely at home.  Most of her blood pressure readings are less than 120/80.  She has been eating healthier and is planning  "on starting an exercise program.    Review of Systems   Constitutional: Negative for activity change and unexpected weight change.   Respiratory: Negative.    Neurological: Negative.    Psychiatric/Behavioral:        As in HPI       Physical Exam   Constitutional: She appears well-developed and well-nourished.   Cardiovascular: Normal rate, regular rhythm and normal heart sounds.   140/90   Neurological: She is alert.   Psychiatric: She has a normal mood and affect. Her behavior is normal. Judgment and thought content normal.   Vitals reviewed.      ASSESSMENT     Problem List Items Addressed This Visit        Cardiovascular and Mediastinum    Hyperlipidemia       Other    Elevated blood pressure reading in office without diagnosis of hypertension - Primary          PLAN  Patient Instructions   Blood pressure is high when I check it but normal otherwise.  You have \"white coat hypertension\".  Agree with starting an exercise program.  Continue to monitor blood pressure periodically at home.  Will recheck lipids in July.    Return in about 6 months (around 9/6/2019) for labs BMP, lipid.  "

## 2019-04-01 ENCOUNTER — APPOINTMENT (OUTPATIENT)
Dept: PREADMISSION TESTING | Facility: HOSPITAL | Age: 54
End: 2019-04-01

## 2019-04-01 VITALS
HEART RATE: 72 BPM | SYSTOLIC BLOOD PRESSURE: 149 MMHG | HEIGHT: 63 IN | DIASTOLIC BLOOD PRESSURE: 94 MMHG | TEMPERATURE: 98.4 F | OXYGEN SATURATION: 99 % | WEIGHT: 156.8 LBS | BODY MASS INDEX: 27.78 KG/M2

## 2019-04-01 LAB
ANION GAP SERPL CALCULATED.3IONS-SCNC: 10.4 MMOL/L
BUN BLD-MCNC: 14 MG/DL (ref 6–20)
BUN/CREAT SERPL: 18.9 (ref 7–25)
CALCIUM SPEC-SCNC: 9.3 MG/DL (ref 8.6–10.5)
CHLORIDE SERPL-SCNC: 103 MMOL/L (ref 98–107)
CO2 SERPL-SCNC: 28.6 MMOL/L (ref 22–29)
CREAT BLD-MCNC: 0.74 MG/DL (ref 0.57–1)
DEPRECATED RDW RBC AUTO: 42.5 FL (ref 37–54)
ERYTHROCYTE [DISTWIDTH] IN BLOOD BY AUTOMATED COUNT: 12.7 % (ref 12.3–15.4)
GFR SERPL CREATININE-BSD FRML MDRD: 81 ML/MIN/1.73
GLUCOSE BLD-MCNC: 101 MG/DL (ref 65–99)
HCT VFR BLD AUTO: 45.6 % (ref 34–46.6)
HGB BLD-MCNC: 14.8 G/DL (ref 12–15.9)
MCH RBC QN AUTO: 29.9 PG (ref 26.6–33)
MCHC RBC AUTO-ENTMCNC: 32.5 G/DL (ref 31.5–35.7)
MCV RBC AUTO: 92.1 FL (ref 79–97)
PLATELET # BLD AUTO: 217 10*3/MM3 (ref 140–450)
PMV BLD AUTO: 11.1 FL (ref 6–12)
POTASSIUM BLD-SCNC: 4.1 MMOL/L (ref 3.5–5.2)
RBC # BLD AUTO: 4.95 10*6/MM3 (ref 3.77–5.28)
SODIUM BLD-SCNC: 142 MMOL/L (ref 136–145)
WBC NRBC COR # BLD: 3.89 10*3/MM3 (ref 3.4–10.8)

## 2019-04-01 PROCEDURE — 36415 COLL VENOUS BLD VENIPUNCTURE: CPT

## 2019-04-01 PROCEDURE — 85027 COMPLETE CBC AUTOMATED: CPT | Performed by: PLASTIC SURGERY

## 2019-04-01 PROCEDURE — 80048 BASIC METABOLIC PNL TOTAL CA: CPT | Performed by: PLASTIC SURGERY

## 2019-04-01 RX ORDER — ATORVASTATIN CALCIUM 10 MG/1
10 TABLET, FILM COATED ORAL NIGHTLY
COMMUNITY
End: 2019-12-06 | Stop reason: ALTCHOICE

## 2019-04-01 NOTE — DISCHARGE INSTRUCTIONS
Take the following medications the morning of surgery with a small sip of water:    NONE    ARRIVE AT 0600.        General Instructions:  • Do not eat solid food after midnight the night before surgery.  • You may drink clear liquids day of surgery but must stop at least one hour before your hospital arrival time.  • It is beneficial for you to have a clear drink that contains carbohydrates the day of surgery.  We suggest a 12 to 20 ounce bottle of Gatorade or Powerade for non-diabetic patients or a 12 to 20 ounce bottle of G2 or Powerade Zero for diabetic patients. (Pediatric patients, are not advised to drink a 12 to 20 ounce carbohydrate drink)    Clear liquids are liquids you can see through.  Nothing red in color.     Plain water                               Sports drinks  Sodas                                   Gelatin (Jell-O)  Fruit juices without pulp such as white grape juice and apple juice  Popsicles that contain no fruit or yogurt  Tea or coffee (no cream or milk added)  Gatorade / Powerade  G2 / Powerade Zero    • Infants may have breast milk up to four hours before surgery.  • Infants drinking formula may drink formula up to six hours before surgery.   • Patients who avoid smoking, chewing tobacco and alcohol for 4 weeks prior to surgery have a reduced risk of post-operative complications.  Quit smoking as many days before surgery as you can.  • Do not smoke, use chewing tobacco or drink alcohol the day of surgery.   • If applicable bring your C-PAP/ BI-PAP machine.  • Bring any papers given to you in the doctor’s office.  • Wear clean comfortable clothes and socks.  • Do not wear contact lenses or make-up.  Bring a case for your glasses.   • Bring crutches or walker if applicable.  • Remove all piercings.  Leave jewelry and any other valuables at home.  • Hair extensions with metal clips must be removed prior to surgery.  • The Pre-Admission Testing nurse will instruct you to bring medications if  unable to obtain an accurate list in Pre-Admission Testing.        If you were given a blood bank ID arm band remember to bring it with you the day of surgery.    Preventing a Surgical Site Infection:  • For 2 to 3 days before surgery, avoid shaving with a razor because the razor can irritate skin and make it easier to develop an infection.    • Any areas of open skin can increase the risk of a post-operative wound infection by allowing bacteria to enter and travel throughout the body.  Notify your surgeon if you have any skin wounds / rashes even if it is not near the expected surgical site.  The area will need assessed to determine if surgery should be delayed until it is healed.  • The night prior to surgery sleep in a clean bed with clean clothing.  Do not allow pets to sleep with you.  • Shower on the morning of surgery using a fresh bar of anti-bacterial soap (such as Dial) and clean washcloth.  Dry with a clean towel and dress in clean clothing.  • Ask your surgeon if you will be receiving antibiotics prior to surgery.  • Make sure you, your family, and all healthcare providers clean their hands with soap and water or an alcohol based hand  before caring for you or your wound.    Day of surgery:  Upon arrival, a Pre-op nurse and Anesthesiologist will review your health history, obtain vital signs, and answer questions you may have.  The only belongings needed at this time will be your home medications and if applicable your C-PAP/BI-PAP machine.  If you are staying overnight your family can leave the rest of your belongings in the car and bring them to your room later.  A Pre-op nurse will start an IV and you may receive medication in preparation for surgery, including something to help you relax.  Your family will be able to see you in the Pre-op area.  While you are in surgery your family should notify the waiting room  if they leave the waiting room area and provide a contact phone  number.    Please be aware that surgery does come with discomfort.  We want to make every effort to control your discomfort so please discuss any uncontrolled symptoms with your nurse.   Your doctor will most likely have prescribed pain medications.      If you are going home after surgery you will receive individualized written care instructions before being discharged.  A responsible adult must drive you to and from the hospital on the day of your surgery and stay with you for 24 hours.    If you are staying overnight following surgery, you will be transported to your hospital room following the recovery period.  [facility] has all private rooms.    You have received a list of surgical assistants for your reference.  If you have any questions please call Pre-Admission Testing at [phone].  Deductibles and co-payments are collected on the day of service. Please be prepared to pay the required co-pay, deductible or deposit on the day of service as defined by your plan.

## 2019-04-04 ENCOUNTER — ANESTHESIA (OUTPATIENT)
Dept: PERIOP | Facility: HOSPITAL | Age: 54
End: 2019-04-04

## 2019-04-04 ENCOUNTER — ANESTHESIA EVENT (OUTPATIENT)
Dept: PERIOP | Facility: HOSPITAL | Age: 54
End: 2019-04-04

## 2019-04-04 ENCOUNTER — HOSPITAL ENCOUNTER (OUTPATIENT)
Facility: HOSPITAL | Age: 54
Setting detail: HOSPITAL OUTPATIENT SURGERY
Discharge: HOME | End: 2019-04-04
Attending: PLASTIC SURGERY | Admitting: PLASTIC SURGERY

## 2019-04-04 VITALS
WEIGHT: 158 LBS | SYSTOLIC BLOOD PRESSURE: 116 MMHG | HEART RATE: 90 BPM | OXYGEN SATURATION: 95 % | HEIGHT: 63 IN | TEMPERATURE: 97.8 F | RESPIRATION RATE: 16 BRPM | BODY MASS INDEX: 28 KG/M2 | DIASTOLIC BLOOD PRESSURE: 68 MMHG

## 2019-04-04 PROCEDURE — 25010000003 CEFAZOLIN IN DEXTROSE 2-4 GM/100ML-% SOLUTION: Performed by: PLASTIC SURGERY

## 2019-04-04 PROCEDURE — 25010000002 MIDAZOLAM PER 1 MG: Performed by: NURSE ANESTHETIST, CERTIFIED REGISTERED

## 2019-04-04 PROCEDURE — 25010000002 PROPOFOL 10 MG/ML EMULSION: Performed by: NURSE ANESTHETIST, CERTIFIED REGISTERED

## 2019-04-04 PROCEDURE — 25010000002 MIDAZOLAM PER 1 MG: Performed by: ANESTHESIOLOGY

## 2019-04-04 PROCEDURE — 25010000002 FENTANYL CITRATE (PF) 100 MCG/2ML SOLUTION: Performed by: NURSE ANESTHETIST, CERTIFIED REGISTERED

## 2019-04-04 PROCEDURE — 25010000002 NEOSTIGMINE PER 0.5 MG: Performed by: NURSE ANESTHETIST, CERTIFIED REGISTERED

## 2019-04-04 PROCEDURE — 25010000002 DEXAMETHASONE PER 1 MG: Performed by: NURSE ANESTHETIST, CERTIFIED REGISTERED

## 2019-04-04 PROCEDURE — 25010000002 ONDANSETRON PER 1 MG: Performed by: NURSE ANESTHETIST, CERTIFIED REGISTERED

## 2019-04-04 RX ORDER — LIDOCAINE HYDROCHLORIDE AND EPINEPHRINE 5; 5 MG/ML; UG/ML
INJECTION, SOLUTION INFILTRATION; PERINEURAL AS NEEDED
Status: DISCONTINUED | OUTPATIENT
Start: 2019-04-04 | End: 2019-04-04 | Stop reason: HOSPADM

## 2019-04-04 RX ORDER — LIDOCAINE HYDROCHLORIDE 10 MG/ML
0.5 INJECTION, SOLUTION EPIDURAL; INFILTRATION; INTRACAUDAL; PERINEURAL ONCE AS NEEDED
Status: DISCONTINUED | OUTPATIENT
Start: 2019-04-04 | End: 2019-04-04 | Stop reason: HOSPADM

## 2019-04-04 RX ORDER — SODIUM CHLORIDE 0.9 % (FLUSH) 0.9 %
1-10 SYRINGE (ML) INJECTION AS NEEDED
Status: DISCONTINUED | OUTPATIENT
Start: 2019-04-04 | End: 2019-04-04 | Stop reason: HOSPADM

## 2019-04-04 RX ORDER — OXYCODONE HYDROCHLORIDE 5 MG/1
10 TABLET ORAL ONCE
Status: COMPLETED | OUTPATIENT
Start: 2019-04-04 | End: 2019-04-04

## 2019-04-04 RX ORDER — MIDAZOLAM HYDROCHLORIDE 1 MG/ML
1 INJECTION INTRAMUSCULAR; INTRAVENOUS
Status: DISCONTINUED | OUTPATIENT
Start: 2019-04-04 | End: 2019-04-04 | Stop reason: HOSPADM

## 2019-04-04 RX ORDER — ONDANSETRON HYDROCHLORIDE 8 MG/1
8 TABLET, FILM COATED ORAL ONCE
Status: COMPLETED | OUTPATIENT
Start: 2019-04-04 | End: 2019-04-04

## 2019-04-04 RX ORDER — ALBUTEROL SULFATE 2.5 MG/3ML
2.5 SOLUTION RESPIRATORY (INHALATION) ONCE AS NEEDED
Status: DISCONTINUED | OUTPATIENT
Start: 2019-04-04 | End: 2019-04-04 | Stop reason: HOSPADM

## 2019-04-04 RX ORDER — GLYCOPYRROLATE 0.2 MG/ML
INJECTION INTRAMUSCULAR; INTRAVENOUS AS NEEDED
Status: DISCONTINUED | OUTPATIENT
Start: 2019-04-04 | End: 2019-04-04 | Stop reason: SURG

## 2019-04-04 RX ORDER — ONDANSETRON 4 MG/1
4 TABLET, FILM COATED ORAL ONCE AS NEEDED
Status: DISCONTINUED | OUTPATIENT
Start: 2019-04-04 | End: 2019-04-04 | Stop reason: HOSPADM

## 2019-04-04 RX ORDER — HYDROCODONE BITARTRATE AND ACETAMINOPHEN 5; 325 MG/1; MG/1
1-2 TABLET ORAL EVERY 4 HOURS PRN
Qty: 20 TABLET | Refills: 0 | Status: SHIPPED | OUTPATIENT
Start: 2019-04-04 | End: 2019-08-01

## 2019-04-04 RX ORDER — ACETAMINOPHEN 500 MG
1000 TABLET ORAL ONCE
Status: COMPLETED | OUTPATIENT
Start: 2019-04-04 | End: 2019-04-04

## 2019-04-04 RX ORDER — LIDOCAINE HYDROCHLORIDE 20 MG/ML
INJECTION, SOLUTION INFILTRATION; PERINEURAL AS NEEDED
Status: DISCONTINUED | OUTPATIENT
Start: 2019-04-04 | End: 2019-04-04 | Stop reason: SURG

## 2019-04-04 RX ORDER — ONDANSETRON 2 MG/ML
4 INJECTION INTRAMUSCULAR; INTRAVENOUS ONCE AS NEEDED
Status: DISCONTINUED | OUTPATIENT
Start: 2019-04-04 | End: 2019-04-04 | Stop reason: HOSPADM

## 2019-04-04 RX ORDER — FAMOTIDINE 10 MG/ML
20 INJECTION, SOLUTION INTRAVENOUS ONCE
Status: COMPLETED | OUTPATIENT
Start: 2019-04-04 | End: 2019-04-04

## 2019-04-04 RX ORDER — ACETAMINOPHEN 325 MG/1
650 TABLET ORAL EVERY 4 HOURS PRN
Qty: 30 TABLET | Refills: 0 | Status: SHIPPED | OUTPATIENT
Start: 2019-04-04 | End: 2019-08-01

## 2019-04-04 RX ORDER — DIPHENHYDRAMINE HYDROCHLORIDE 50 MG/ML
12.5 INJECTION INTRAMUSCULAR; INTRAVENOUS
Status: DISCONTINUED | OUTPATIENT
Start: 2019-04-04 | End: 2019-04-04 | Stop reason: HOSPADM

## 2019-04-04 RX ORDER — SODIUM CHLORIDE, SODIUM LACTATE, POTASSIUM CHLORIDE, CALCIUM CHLORIDE 600; 310; 30; 20 MG/100ML; MG/100ML; MG/100ML; MG/100ML
125 INJECTION, SOLUTION INTRAVENOUS CONTINUOUS
Status: DISCONTINUED | OUTPATIENT
Start: 2019-04-04 | End: 2019-04-04 | Stop reason: HOSPADM

## 2019-04-04 RX ORDER — PROMETHAZINE HYDROCHLORIDE 25 MG/1
12.5 TABLET ORAL ONCE AS NEEDED
Status: DISCONTINUED | OUTPATIENT
Start: 2019-04-04 | End: 2019-04-04 | Stop reason: HOSPADM

## 2019-04-04 RX ORDER — PROMETHAZINE HYDROCHLORIDE 25 MG/1
25 TABLET ORAL ONCE AS NEEDED
Status: DISCONTINUED | OUTPATIENT
Start: 2019-04-04 | End: 2019-04-04 | Stop reason: HOSPADM

## 2019-04-04 RX ORDER — GABAPENTIN 300 MG/1
300 CAPSULE ORAL ONCE
Status: COMPLETED | OUTPATIENT
Start: 2019-04-04 | End: 2019-04-04

## 2019-04-04 RX ORDER — ROCURONIUM BROMIDE 10 MG/ML
INJECTION, SOLUTION INTRAVENOUS AS NEEDED
Status: DISCONTINUED | OUTPATIENT
Start: 2019-04-04 | End: 2019-04-04 | Stop reason: SURG

## 2019-04-04 RX ORDER — FENTANYL CITRATE 50 UG/ML
INJECTION, SOLUTION INTRAMUSCULAR; INTRAVENOUS AS NEEDED
Status: DISCONTINUED | OUTPATIENT
Start: 2019-04-04 | End: 2019-04-04 | Stop reason: SURG

## 2019-04-04 RX ORDER — SODIUM CHLORIDE, SODIUM LACTATE, POTASSIUM CHLORIDE, CALCIUM CHLORIDE 600; 310; 30; 20 MG/100ML; MG/100ML; MG/100ML; MG/100ML
100 INJECTION, SOLUTION INTRAVENOUS CONTINUOUS
Status: DISCONTINUED | OUTPATIENT
Start: 2019-04-04 | End: 2019-04-04 | Stop reason: HOSPADM

## 2019-04-04 RX ORDER — PROPOFOL 10 MG/ML
VIAL (ML) INTRAVENOUS AS NEEDED
Status: DISCONTINUED | OUTPATIENT
Start: 2019-04-04 | End: 2019-04-04 | Stop reason: SURG

## 2019-04-04 RX ORDER — MEPERIDINE HYDROCHLORIDE 25 MG/ML
12.5 INJECTION INTRAMUSCULAR; INTRAVENOUS; SUBCUTANEOUS
Status: DISCONTINUED | OUTPATIENT
Start: 2019-04-04 | End: 2019-04-04 | Stop reason: HOSPADM

## 2019-04-04 RX ORDER — FLUMAZENIL 0.1 MG/ML
0.2 INJECTION INTRAVENOUS AS NEEDED
Status: DISCONTINUED | OUTPATIENT
Start: 2019-04-04 | End: 2019-04-04 | Stop reason: HOSPADM

## 2019-04-04 RX ORDER — DIPHENHYDRAMINE HCL 25 MG
25 CAPSULE ORAL
Status: DISCONTINUED | OUTPATIENT
Start: 2019-04-04 | End: 2019-04-04 | Stop reason: HOSPADM

## 2019-04-04 RX ORDER — SODIUM CHLORIDE, SODIUM LACTATE, POTASSIUM CHLORIDE, CALCIUM CHLORIDE 600; 310; 30; 20 MG/100ML; MG/100ML; MG/100ML; MG/100ML
9 INJECTION, SOLUTION INTRAVENOUS CONTINUOUS
Status: DISCONTINUED | OUTPATIENT
Start: 2019-04-04 | End: 2019-04-04 | Stop reason: HOSPADM

## 2019-04-04 RX ORDER — CELECOXIB 200 MG/1
400 CAPSULE ORAL ONCE
Status: COMPLETED | OUTPATIENT
Start: 2019-04-04 | End: 2019-04-04

## 2019-04-04 RX ORDER — ACETAMINOPHEN 325 MG/1
650 TABLET ORAL ONCE AS NEEDED
Status: DISCONTINUED | OUTPATIENT
Start: 2019-04-04 | End: 2019-04-04 | Stop reason: HOSPADM

## 2019-04-04 RX ORDER — HYDRALAZINE HYDROCHLORIDE 20 MG/ML
5 INJECTION INTRAMUSCULAR; INTRAVENOUS
Status: DISCONTINUED | OUTPATIENT
Start: 2019-04-04 | End: 2019-04-04 | Stop reason: HOSPADM

## 2019-04-04 RX ORDER — BACITRACIN ZINC AND POLYMYXIN B SULFATE 500; 10000 [USP'U]/G; [USP'U]/G
OINTMENT OPHTHALMIC DAILY
Qty: 3.5 G | Refills: 0 | Status: SHIPPED | OUTPATIENT
Start: 2019-04-04 | End: 2019-08-01

## 2019-04-04 RX ORDER — CEFAZOLIN SODIUM 2 G/100ML
2 INJECTION, SOLUTION INTRAVENOUS ONCE
Status: COMPLETED | OUTPATIENT
Start: 2019-04-04 | End: 2019-04-04

## 2019-04-04 RX ORDER — ONDANSETRON 2 MG/ML
INJECTION INTRAMUSCULAR; INTRAVENOUS AS NEEDED
Status: DISCONTINUED | OUTPATIENT
Start: 2019-04-04 | End: 2019-04-04 | Stop reason: SURG

## 2019-04-04 RX ORDER — FENTANYL CITRATE 50 UG/ML
50 INJECTION, SOLUTION INTRAMUSCULAR; INTRAVENOUS
Status: DISCONTINUED | OUTPATIENT
Start: 2019-04-04 | End: 2019-04-04 | Stop reason: HOSPADM

## 2019-04-04 RX ORDER — MIDAZOLAM HYDROCHLORIDE 1 MG/ML
INJECTION INTRAMUSCULAR; INTRAVENOUS AS NEEDED
Status: DISCONTINUED | OUTPATIENT
Start: 2019-04-04 | End: 2019-04-04 | Stop reason: SURG

## 2019-04-04 RX ORDER — EPHEDRINE SULFATE 50 MG/ML
INJECTION, SOLUTION INTRAVENOUS AS NEEDED
Status: DISCONTINUED | OUTPATIENT
Start: 2019-04-04 | End: 2019-04-04 | Stop reason: SURG

## 2019-04-04 RX ORDER — MIDAZOLAM HYDROCHLORIDE 1 MG/ML
2 INJECTION INTRAMUSCULAR; INTRAVENOUS
Status: DISCONTINUED | OUTPATIENT
Start: 2019-04-04 | End: 2019-04-04 | Stop reason: HOSPADM

## 2019-04-04 RX ORDER — WOUND DRESSING ADHESIVE - LIQUID
LIQUID MISCELLANEOUS AS NEEDED
Status: DISCONTINUED | OUTPATIENT
Start: 2019-04-04 | End: 2019-04-04 | Stop reason: HOSPADM

## 2019-04-04 RX ORDER — HYDROCODONE BITARTRATE AND ACETAMINOPHEN 7.5; 325 MG/1; MG/1
1 TABLET ORAL ONCE AS NEEDED
Status: DISCONTINUED | OUTPATIENT
Start: 2019-04-04 | End: 2019-04-04 | Stop reason: HOSPADM

## 2019-04-04 RX ORDER — HYDROCODONE BITARTRATE AND ACETAMINOPHEN 5; 325 MG/1; MG/1
1 TABLET ORAL ONCE AS NEEDED
Status: DISCONTINUED | OUTPATIENT
Start: 2019-04-04 | End: 2019-04-04 | Stop reason: HOSPADM

## 2019-04-04 RX ORDER — PROMETHAZINE HYDROCHLORIDE 25 MG/ML
12.5 INJECTION, SOLUTION INTRAMUSCULAR; INTRAVENOUS ONCE AS NEEDED
Status: DISCONTINUED | OUTPATIENT
Start: 2019-04-04 | End: 2019-04-04 | Stop reason: HOSPADM

## 2019-04-04 RX ORDER — LABETALOL HYDROCHLORIDE 5 MG/ML
5 INJECTION, SOLUTION INTRAVENOUS
Status: DISCONTINUED | OUTPATIENT
Start: 2019-04-04 | End: 2019-04-04 | Stop reason: HOSPADM

## 2019-04-04 RX ORDER — GABAPENTIN 100 MG/1
100 CAPSULE ORAL 3 TIMES DAILY
Qty: 60 CAPSULE | Refills: 1 | Status: SHIPPED | OUTPATIENT
Start: 2019-04-04 | End: 2019-08-01

## 2019-04-04 RX ORDER — EPHEDRINE SULFATE 50 MG/ML
5 INJECTION, SOLUTION INTRAVENOUS ONCE AS NEEDED
Status: DISCONTINUED | OUTPATIENT
Start: 2019-04-04 | End: 2019-04-04 | Stop reason: HOSPADM

## 2019-04-04 RX ORDER — ACETAMINOPHEN 325 MG/1
650 TABLET ORAL ONCE
Status: DISCONTINUED | OUTPATIENT
Start: 2019-04-04 | End: 2019-04-04 | Stop reason: HOSPADM

## 2019-04-04 RX ORDER — BALANCED SALT SOLUTION 6.4; .75; .48; .3; 3.9; 1.7 MG/ML; MG/ML; MG/ML; MG/ML; MG/ML; MG/ML
SOLUTION OPHTHALMIC AS NEEDED
Status: DISCONTINUED | OUTPATIENT
Start: 2019-04-04 | End: 2019-04-04 | Stop reason: HOSPADM

## 2019-04-04 RX ORDER — DEXAMETHASONE SODIUM PHOSPHATE 10 MG/ML
INJECTION INTRAMUSCULAR; INTRAVENOUS AS NEEDED
Status: DISCONTINUED | OUTPATIENT
Start: 2019-04-04 | End: 2019-04-04 | Stop reason: SURG

## 2019-04-04 RX ORDER — OXYCODONE AND ACETAMINOPHEN 7.5; 325 MG/1; MG/1
1 TABLET ORAL ONCE AS NEEDED
Status: DISCONTINUED | OUTPATIENT
Start: 2019-04-04 | End: 2019-04-04 | Stop reason: HOSPADM

## 2019-04-04 RX ORDER — NALOXONE HCL 0.4 MG/ML
0.2 VIAL (ML) INJECTION AS NEEDED
Status: DISCONTINUED | OUTPATIENT
Start: 2019-04-04 | End: 2019-04-04 | Stop reason: HOSPADM

## 2019-04-04 RX ORDER — HYDROMORPHONE HYDROCHLORIDE 1 MG/ML
0.5 INJECTION, SOLUTION INTRAMUSCULAR; INTRAVENOUS; SUBCUTANEOUS
Status: DISCONTINUED | OUTPATIENT
Start: 2019-04-04 | End: 2019-04-04 | Stop reason: HOSPADM

## 2019-04-04 RX ORDER — PROMETHAZINE HYDROCHLORIDE 25 MG/1
25 SUPPOSITORY RECTAL ONCE AS NEEDED
Status: DISCONTINUED | OUTPATIENT
Start: 2019-04-04 | End: 2019-04-04 | Stop reason: HOSPADM

## 2019-04-04 RX ADMIN — EPHEDRINE SULFATE 5 MG: 50 INJECTION INTRAMUSCULAR; INTRAVENOUS; SUBCUTANEOUS at 09:22

## 2019-04-04 RX ADMIN — EPHEDRINE SULFATE 5 MG: 50 INJECTION INTRAMUSCULAR; INTRAVENOUS; SUBCUTANEOUS at 08:52

## 2019-04-04 RX ADMIN — ONDANSETRON 4 MG: 2 INJECTION INTRAMUSCULAR; INTRAVENOUS at 10:30

## 2019-04-04 RX ADMIN — ROCURONIUM BROMIDE 50 MG: 10 INJECTION INTRAVENOUS at 08:02

## 2019-04-04 RX ADMIN — LIDOCAINE HYDROCHLORIDE 80 MG: 20 INJECTION, SOLUTION INFILTRATION; PERINEURAL at 08:02

## 2019-04-04 RX ADMIN — FAMOTIDINE 20 MG: 10 INJECTION INTRAVENOUS at 06:57

## 2019-04-04 RX ADMIN — ONDANSETRON 8 MG: 8 TABLET, FILM COATED ORAL at 06:54

## 2019-04-04 RX ADMIN — OXYCODONE HYDROCHLORIDE 10 MG: 5 TABLET ORAL at 06:54

## 2019-04-04 RX ADMIN — Medication 2 MG: at 07:58

## 2019-04-04 RX ADMIN — SODIUM CHLORIDE, POTASSIUM CHLORIDE, SODIUM LACTATE AND CALCIUM CHLORIDE 125 ML/HR: 600; 310; 30; 20 INJECTION, SOLUTION INTRAVENOUS at 06:53

## 2019-04-04 RX ADMIN — NEOSTIGMINE METHYLSULFATE 4 MG: 1 INJECTION INTRAMUSCULAR; INTRAVENOUS; SUBCUTANEOUS at 10:55

## 2019-04-04 RX ADMIN — HYDROCODONE BITARTRATE AND ACETAMINOPHEN 1 TABLET: 5; 325 TABLET ORAL at 12:45

## 2019-04-04 RX ADMIN — CEFAZOLIN SODIUM 2 G: 2 INJECTION, SOLUTION INTRAVENOUS at 07:55

## 2019-04-04 RX ADMIN — FENTANYL CITRATE 100 MCG: 50 INJECTION INTRAMUSCULAR; INTRAVENOUS at 08:02

## 2019-04-04 RX ADMIN — MIDAZOLAM 1 MG: 1 INJECTION INTRAMUSCULAR; INTRAVENOUS at 06:58

## 2019-04-04 RX ADMIN — EPHEDRINE SULFATE 5 MG: 50 INJECTION INTRAMUSCULAR; INTRAVENOUS; SUBCUTANEOUS at 09:47

## 2019-04-04 RX ADMIN — ACETAMINOPHEN 1000 MG: 500 TABLET, FILM COATED ORAL at 06:54

## 2019-04-04 RX ADMIN — CELECOXIB 400 MG: 200 CAPSULE ORAL at 06:54

## 2019-04-04 RX ADMIN — MIDAZOLAM 1 MG: 1 INJECTION INTRAMUSCULAR; INTRAVENOUS at 07:24

## 2019-04-04 RX ADMIN — DEXAMETHASONE SODIUM PHOSPHATE 8 MG: 10 INJECTION INTRAMUSCULAR; INTRAVENOUS at 08:15

## 2019-04-04 RX ADMIN — GABAPENTIN 300 MG: 300 CAPSULE ORAL at 06:54

## 2019-04-04 RX ADMIN — ROCURONIUM BROMIDE 10 MG: 10 INJECTION INTRAVENOUS at 09:26

## 2019-04-04 RX ADMIN — GLYCOPYRROLATE 0.6 MG: 0.2 INJECTION INTRAMUSCULAR; INTRAVENOUS at 10:55

## 2019-04-04 RX ADMIN — PROPOFOL 200 MG: 10 INJECTION, EMULSION INTRAVENOUS at 08:02

## 2019-04-04 RX ADMIN — SODIUM CHLORIDE 12 MCG/HR: 900 INJECTION, SOLUTION INTRAVENOUS at 08:10

## 2019-04-04 NOTE — H&P
Patient Care Team:  Yordy Mishra MD as PCP - General    Chief complaint vision changes and dermatochalasis and brow ptosis    Subjective     History of Present Illness  Presented with concerns regarding vision changes and heavy brows.  She was seen for visual testing which identified a modifiable change in vision with repositioning of the brows.  She does have occasional dry eye symptoms but these are quite mild and she does not take anything for this.      Review of Systems   Constitutional: Negative for activity change and appetite change.   Eyes: Positive for visual disturbance. Negative for pain, discharge and itching.   Respiratory: Negative for apnea, cough, chest tightness and shortness of breath.    Cardiovascular: Negative for chest pain.        Past Medical History:   Diagnosis Date   • Abnormal Pap smear of cervix    • Anemia    • Fibrocystic breast    • Hyperlipidemia      Past Surgical History:   Procedure Laterality Date   • BILATERAL SALPINGO OOPHORECTOMY  2015    Dr. Reynolds   •  SECTION     • COLONOSCOPY N/A 2016    Procedure: COLONOSCOPY;  Surgeon: Devon Campos MD;  Location: [facility] ENDOSCOPY;  Service:    • OOPHORECTOMY     • SUPRACERVICAL HYSTERECTOMY SALPINGO OOPHORECTOMY  2015    supracervical lázaro/ Rodolfo    • TUBAL ABDOMINAL LIGATION       Family History   Problem Relation Age of Onset   • Rheum arthritis Maternal Aunt    • Hyperlipidemia Maternal Aunt    • Ovarian cancer Maternal Aunt    • Alzheimer's disease Paternal Grandmother    • Hyperlipidemia Maternal Grandmother    • Hyperlipidemia Mother    • Ovarian cancer Mother    • Hyperlipidemia Maternal Aunt    • Ovarian cancer Maternal Aunt    • Malig Hyperthermia Neg Hx      Social History     Tobacco Use   • Smoking status: Never Smoker   • Smokeless tobacco: Never Used   Substance Use Topics   • Alcohol use: Yes     Comment: occasional    • Drug use: No     Medications Prior to  Admission   Medication Sig Dispense Refill Last Dose   • atorvastatin (LIPITOR) 10 MG tablet Take 10 mg by mouth Every Night.   2 weeks   • Multiple Vitamins-Minerals (MULTIVITAMIN ADULT PO) Take 1 tablet by mouth.   2 weeks     Allergies:  Patient has no known allergies.    Objective      Vital Signs  Temp:  [98.3 °F (36.8 °C)] 98.3 °F (36.8 °C)  Heart Rate:  [70] 70  Resp:  [16] 16  BP: (144)/(94) 144/94    Physical Exam   Constitutional: She appears well-developed and well-nourished.   HENT:   Head: Normocephalic and atraumatic.   Eyes: EOM are normal. Pupils are equal, round, and reactive to light.   MRD1 3mm, normal levator excursion, high tone in frontalis at rest, dermatochalasis of upper lids R>L, right brow sits at sup orb rim, left is 5mm above, moderated prominence of post-septal fat in upper lids   Cardiovascular: Normal rate and regular rhythm.   Pulmonary/Chest: Effort normal.       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    To OR for bilateral upper lid blepharoplasty with levator dehiscence repair and endoscopic brow lift.    We discussed the importance of eye lubrication after surgery given her history of mild dry eye symptoms.    I discussed the patients findings and my recommendations with patient    Kye Guerrero MD  04/04/19  7:25 AM

## 2019-04-04 NOTE — ANESTHESIA PROCEDURE NOTES
Airway  Urgency: elective    Airway not difficult    General Information and Staff    Patient location during procedure: OR  Anesthesiologist: Yessi Kaufman MD  CRNA: Nlida Woods CRNA    Indications and Patient Condition  Indications for airway management: airway protection    Preoxygenated: yes  MILS not maintained throughout  Mask difficulty assessment: 1 - vent by mask    Final Airway Details  Final airway type: endotracheal airway      Successful airway: ETT  Cuffed: yes   Successful intubation technique: direct laryngoscopy  Facilitating devices/methods: intubating stylet  Endotracheal tube insertion site: oral  Blade: Iesha  Blade size: 3  ETT size (mm): 7.0  Cormack-Lehane Classification: grade I - full view of glottis  Placement verified by: chest auscultation   Cuff volume (mL): 8  Measured from: lips  ETT to lips (cm): 21  Number of attempts at approach: 1    Additional Comments  PreO2 100% face mask, IV induction, easy mask, DVL x1, cords noted, tube through, cuff up, EBBSH, +etCO2, = chest movement, tube secured in place, atraumatic, teeth and lips intact as preop.

## 2019-04-04 NOTE — ANESTHESIA POSTPROCEDURE EVALUATION
Patient: Maricarmen Hayes    Procedure Summary     Date:  04/04/19 Room / Location:  [facility] OR 03 / [facility] MAIN OR    Anesthesia Start:  0755 Anesthesia Stop:  1119    Procedures:       BROW LIFT AND  BILATERAL UPPER LID BLEPHAROPLASTY (Bilateral Face)      BLEPHAROPLASTY (Bilateral Eye) Diagnosis:      Surgeon:  Kye Guerrero MD Provider:  Yessi Kaufman MD    Anesthesia Type:  general ASA Status:  2          Anesthesia Type: general  Last vitals  BP   118/75 (04/04/19 1200)   Temp   36.6 °C (97.8 °F) (04/04/19 1115)   Pulse   80 (04/04/19 1200)   Resp   16 (04/04/19 1200)     SpO2   93 % (04/04/19 1200)     Post Anesthesia Care and Evaluation    Patient location during evaluation: bedside  Patient participation: complete - patient participated  Level of consciousness: awake  Pain management: adequate  Airway patency: patent  Anesthetic complications: No anesthetic complications    Cardiovascular status: acceptable  Respiratory status: acceptable  Hydration status: acceptable

## 2019-04-04 NOTE — ANESTHESIA PREPROCEDURE EVALUATION
Anesthesia Evaluation     Patient summary reviewed and Nursing notes reviewed   no history of anesthetic complications:  NPO Solid Status: > 8 hours  NPO Liquid Status: > 2 hours           Airway   Mallampati: II  TM distance: >3 FB  Neck ROM: full  No difficulty expected  Dental - normal exam     Pulmonary - negative pulmonary ROS and normal exam    breath sounds clear to auscultation  Cardiovascular - normal exam    Rhythm: regular  Rate: normal    (+) hyperlipidemia,       Neuro/Psych- negative ROS  GI/Hepatic/Renal/Endo - negative ROS     Musculoskeletal (-) negative ROS    Abdominal  - normal exam   Substance History - negative use     OB/GYN negative ob/gyn ROS         Other - negative ROS                       Anesthesia Plan    ASA 2     general     intravenous induction   Anesthetic plan, all risks, benefits, and alternatives have been provided, discussed and informed consent has been obtained with: patient.

## 2019-04-08 NOTE — OP NOTE
Pre-Operative Diagnosis: upper lid dermatochalasis and brow ptosis    Post-Operative Diagnosis: Same    Procedure Performed: endoscopic brow lift and bilateral upper lid blepharoplasty with levator dehiscence repair    Surgeon: CARLITO Geurrero MD    Assistant: None    Anesthesia: General    Estimated Blood Loss: 10    Specimens: None    Complications: None    Indications: Ms. Hayes presented with complaints of vision difficulty and excess eyelid skin and droopy eyebrows.  A vision test identified modifiable visual field deficit.  We discussed also that the function of her levator was normal but its position at rest was somewhat low.  We discussed repair of her levator dehiscence along with upper blepharoplasty and endoscopic brow lift.  We discuss dry eye symptoms and the importance of eye drops after surgery.    We discussed risks, benefits and alternatives including but no limited to: bleeding, infection, asymmetry, poor or slow wound healing, need for further surgery, possible recurrence.  The patient elected to proceed.    Description of Procedure: The patient was met in the preoperative holding area.  All questions were answered and informed consent was assured.      In a sitting position the lower incision was marked 10mm from the lid margin above the pupil and 7mm at the medial and lateral canthus.  The upper incision was marked with a pinch test, 28mm of remaining skin from margin to brow was ensured.  Pretrichial endobrow incisions were marked at the midline and above the lateral aspect of the eyebrow.  She was transferred to the operating room.    After induction of appropriate anesthesia, a timeout was performed correctly identifying the patient, operative site, and procedure to be performed.  All present were in agreement.  The bed was turned and the marks wer confirmed and reinforced.  Local anesthetic was infiltrated, 1cc of 1% lidocaine with 1:100,000 epi in each lid and 0.5% lidocain with  1:50,0000 epi in the forehead. Eye lubrication was placed.    The face was prepped and draped in a sterile fashion.  The hairline incisions were made down to periosteum.  Elevators were used to raise the periosteum down to 1cm above the superior orbital rim and to the temporal adhesion laterally.  The scope was then placed and dissection was carried to the orbital rim under direct vision.  Once the rim was reached the periosteum was incised.  The procerus and corrugators were divided under direct vision and the neurovascular bundles were visualized and protected.  Laterally the periosteum was incised along the temporal adhesion.  The forehead was irrigated and the incisions were closed in a layered fashion with 3-0 monocryl in the deep dermal layer and 5-0 nylon in the skin.      The eyelids were then addressed.  The previously marked incisions were made and the skin was removed with curved iris scissors. A small slip of orbicularis muscle was removed above the level of the tarsus. The levator aponeurosis was identified and found to be attenuated as expected.  The was tightened with several 5mm wide sutures of 6-0 vicryl in a horizontal mattress fashion.  Dissection was carried superiorly and the septum was identified and a small aponeurotomy made.  Gentle pressure was placed in the lower lid and the fat herniation from the central compartment was clamped and ligated. Hemostasis of the fat was confirmed prior to allowing it to retract. The lacrimal gland was also found to be quite ptotic and this was resuspended within the supraorbital rim with 6-0 vicryl.  6-0 vicryl was placed in two locations to secure the orbicularis and the skin was closed with a running 6-0 prolene with interrupted 6-0 prolene in the lateral portion of the incision.  The left eye was addressed in an identical fashion including treatment of the lacrimal gland.  The levator repair was confirmed to be symmetric with the right side.    Chilled  eyepads were placed on the eyes and a compression wrap placed on the forehead.  Eye lubrication was re-applied and antibiotic ophthalmic ointment was placed on the incisions.    The patient was then aroused from anesthesia with ease and transferred to the postoperative care area in good condition. All sponge, needle, and instrument counts were correct.

## 2019-08-01 ENCOUNTER — OFFICE VISIT (OUTPATIENT)
Dept: FAMILY MEDICINE CLINIC | Facility: CLINIC | Age: 54
End: 2019-08-01

## 2019-08-01 VITALS
BODY MASS INDEX: 25.58 KG/M2 | DIASTOLIC BLOOD PRESSURE: 80 MMHG | OXYGEN SATURATION: 99 % | HEART RATE: 81 BPM | WEIGHT: 139 LBS | HEIGHT: 62 IN | TEMPERATURE: 97.1 F | SYSTOLIC BLOOD PRESSURE: 110 MMHG | RESPIRATION RATE: 15 BRPM

## 2019-08-01 DIAGNOSIS — M25.511 ACUTE PAIN OF RIGHT SHOULDER: Primary | ICD-10-CM

## 2019-08-01 PROCEDURE — 99213 OFFICE O/P EST LOW 20 MIN: CPT | Performed by: INTERNAL MEDICINE

## 2019-08-01 NOTE — PATIENT INSTRUCTIONS
You have strained your rotator cuff.  Discussed proper shoulder posture as well as exercises for the rotator cuff.  Will refer to physical therapy.  If symptoms continue will consider imaging.

## 2019-08-01 NOTE — PROGRESS NOTES
Subjective   Maricarmen Hayes is a 54 y.o. female. Patient is here today for   Chief Complaint   Patient presents with   • Shoulder Pain     x a couple of weeks right shoulder          Vitals:    08/01/19 0856   BP: 110/80   Pulse: 81   Resp: 15   Temp: 97.1 °F (36.2 °C)   SpO2: 99%     The following portions of the patient's history were reviewed and updated as appropriate: allergies, current medications, past family history, past medical history, past social history, past surgical history and problem list.    Past Medical History:   Diagnosis Date   • Abnormal Pap smear of cervix    • Anemia    • Fibrocystic breast    • Hyperlipidemia       No Known Allergies   Social History     Socioeconomic History   • Marital status:      Spouse name: Ralph   • Number of children: 2   • Years of education: 14   • Highest education level: Not on file   Occupational History   • Occupation: sales   Tobacco Use   • Smoking status: Never Smoker   • Smokeless tobacco: Never Used   Substance and Sexual Activity   • Alcohol use: Yes     Comment: occasional    • Drug use: No   • Sexual activity: Yes     Partners: Male     Birth control/protection: Surgical     Comment: HYSTERECTOMY  2015        Current Outpatient Medications:   •  atorvastatin (LIPITOR) 10 MG tablet, Take 10 mg by mouth Every Night., Disp: , Rfl:      Objective     History of Present Illness Maricarmen complains of right shoulder pain that started 1 month ago.  She had a dog on a leash and the dog jerked her right shoulder.  She felt a pop and had immediate pain.  She has pain when she lifts her arm above horizontal.  She has not taken any medicines for the pain.    Review of Systems   Constitutional: Negative.    Respiratory: Negative.    Cardiovascular: Negative.    Musculoskeletal:        As in HPI   Neurological: Negative.        Physical Exam   Constitutional: She appears well-developed and well-nourished.   Musculoskeletal:   Shoulder posture is poor.  There is  normal range of motion.  There are positive supraspinatus signs.   Neurological: She is alert.   Psychiatric: She has a normal mood and affect. Her behavior is normal. Judgment and thought content normal.   Vitals reviewed.      ASSESSMENT     Problem List Items Addressed This Visit        Nervous and Auditory    Acute pain of right shoulder - Primary    Relevant Orders    Ambulatory Referral to Physical Therapy          PLAN  Patient Instructions   You have strained your rotator cuff.  Discussed proper shoulder posture as well as exercises for the rotator cuff.  Will refer to physical therapy.  If symptoms continue will consider imaging.    Return if symptoms worsen or fail to improve.

## 2019-09-25 DIAGNOSIS — M25.511 ACUTE PAIN OF RIGHT SHOULDER: Primary | ICD-10-CM

## 2019-10-11 ENCOUNTER — TELEPHONE (OUTPATIENT)
Dept: OBSTETRICS AND GYNECOLOGY | Age: 54
End: 2019-10-11

## 2019-10-21 ENCOUNTER — HOSPITAL ENCOUNTER (OUTPATIENT)
Dept: MRI IMAGING | Facility: HOSPITAL | Age: 54
Discharge: HOME | End: 2019-10-21
Admitting: INTERNAL MEDICINE

## 2019-10-21 PROCEDURE — 73221 MRI JOINT UPR EXTREM W/O DYE: CPT

## 2019-10-28 ENCOUNTER — OFFICE VISIT (OUTPATIENT)
Dept: FAMILY MEDICINE CLINIC | Facility: CLINIC | Age: 54
End: 2019-10-28

## 2019-10-28 VITALS
BODY MASS INDEX: 25.58 KG/M2 | WEIGHT: 139 LBS | TEMPERATURE: 98.6 F | OXYGEN SATURATION: 98 % | HEIGHT: 62 IN | HEART RATE: 81 BPM | DIASTOLIC BLOOD PRESSURE: 80 MMHG | RESPIRATION RATE: 15 BRPM | SYSTOLIC BLOOD PRESSURE: 110 MMHG

## 2019-10-28 DIAGNOSIS — M19.011 OSTEOARTHRITIS OF RIGHT SHOULDER, UNSPECIFIED OSTEOARTHRITIS TYPE: Primary | ICD-10-CM

## 2019-10-28 PROCEDURE — 99213 OFFICE O/P EST LOW 20 MIN: CPT | Performed by: INTERNAL MEDICINE

## 2019-10-28 NOTE — PROGRESS NOTES
Subjective   Maricarmen Hayes is a 54 y.o. female. Patient is here today for   Chief Complaint   Patient presents with   • Results     HAD MRI          Vitals:    10/28/19 1446   BP: 110/80   Pulse: 81   Resp: 15   Temp: 98.6 °F (37 °C)   SpO2: 98%     The following portions of the patient's history were reviewed and updated as appropriate: allergies, current medications, past family history, past medical history, past social history, past surgical history and problem list.    Past Medical History:   Diagnosis Date   • Abnormal Pap smear of cervix    • Anemia    • Fibrocystic breast    • Hyperlipidemia       No Known Allergies   Social History     Socioeconomic History   • Marital status:      Spouse name: Ralph   • Number of children: 2   • Years of education: 14   • Highest education level: Not on file   Occupational History   • Occupation: sales   Tobacco Use   • Smoking status: Never Smoker   • Smokeless tobacco: Never Used   Substance and Sexual Activity   • Alcohol use: Yes     Comment: occasional    • Drug use: No   • Sexual activity: Yes     Partners: Male     Birth control/protection: Surgical     Comment: HYSTERECTOMY  2015        Current Outpatient Medications:   •  atorvastatin (LIPITOR) 10 MG tablet, Take 10 mg by mouth Every Night., Disp: , Rfl:      Objective     History of Present Illness Maricarmen complains of right shoulder pain that started almost 3 months ago.  It started after she was walking her dog and the dog jerked her arm.  She has pain with most movements and also has pain at night.  She has taken some Aleve.  She had an MRI last week.    Review of Systems   Constitutional: Negative.    Respiratory: Negative.    Cardiovascular: Negative.    Musculoskeletal:        As in HPI   Neurological: Negative for weakness and numbness.   Psychiatric/Behavioral: Negative.        Physical Exam   Constitutional: She appears well-developed and well-nourished.   Musculoskeletal:   Right shoulder has  normal range of motion.  There is some crepitus.   Psychiatric: She has a normal mood and affect. Her behavior is normal. Judgment and thought content normal.   Vitals reviewed.      ASSESSMENT     Problem List Items Addressed This Visit        Musculoskeletal and Integument    Osteoarthritis of right shoulder - Primary    Relevant Orders    Ambulatory Referral to Orthopedic Surgery          PLAN  Patient Instructions   Discussed results of MRI at length.  Will refer to orthopedic surgery for further evaluation and treatment.  Suggest taking 2 Aleve twice a day with food.    No Follow-up on file.

## 2019-10-28 NOTE — PATIENT INSTRUCTIONS
Discussed results of MRI at length.  Will refer to orthopedic surgery for further evaluation and treatment.  Suggest taking 2 Aleve twice a day with food.

## 2019-12-06 ENCOUNTER — OFFICE VISIT (OUTPATIENT)
Dept: OBSTETRICS AND GYNECOLOGY | Age: 54
End: 2019-12-06

## 2019-12-06 VITALS
DIASTOLIC BLOOD PRESSURE: 82 MMHG | BODY MASS INDEX: 23.57 KG/M2 | SYSTOLIC BLOOD PRESSURE: 124 MMHG | HEIGHT: 63 IN | WEIGHT: 133 LBS

## 2019-12-06 DIAGNOSIS — Z12.72 VAGINAL PAP SMEAR: ICD-10-CM

## 2019-12-06 DIAGNOSIS — R68.82 DECREASED LIBIDO: ICD-10-CM

## 2019-12-06 DIAGNOSIS — Z01.419 WELL FEMALE EXAM WITH ROUTINE GYNECOLOGICAL EXAM: Primary | ICD-10-CM

## 2019-12-06 DIAGNOSIS — Z13.89 SCREENING FOR BLOOD OR PROTEIN IN URINE: ICD-10-CM

## 2019-12-06 PROBLEM — D72.819 LEUKOPENIA: Status: RESOLVED | Noted: 2018-02-01 | Resolved: 2019-12-06

## 2019-12-06 PROCEDURE — 99396 PREV VISIT EST AGE 40-64: CPT | Performed by: OBSTETRICS & GYNECOLOGY

## 2019-12-06 NOTE — PROGRESS NOTES
Routine Annual Visit    2019    Patient: Maricarmen Hayes          MR#:[id number]      History of Present Illness    Chief Complaint   Patient presents with   • Gynecologic Exam     Annual.  Last pap 18, negative. Last mammo 18, negative. Colonoscopy 16.        54 y.o. female  who presents for annual exam.    The patient presents for routine annual exam reporting continued mild to moderate decreased libido but states the testosterone cream helps some.  The patient reports quality and frequency are satisfactory but just general desire seems diminished.    No LMP recorded (lmp unknown). Patient has had a hysterectomy.  Obstetric History:  OB History      Para Term  AB Living    3 2 2   1 2    SAB TAB Ectopic Molar Multiple Live Births        1     2         Menstrual History:     No LMP recorded (lmp unknown). Patient has had a hysterectomy.       Sexual History:       ________________________________________  Patient Active Problem List   Diagnosis   • Hyperlipidemia   • Well female exam with routine gynecological exam   • Elevated fasting blood sugar   • Elevated blood pressure reading in office without diagnosis of hypertension   • Acute pain of right shoulder   • Osteoarthritis of right shoulder   • Decreased libido       Past Medical History:   Diagnosis Date   • Abnormal Pap smear of cervix    • Anemia    • Fibrocystic breast    • Hyperlipidemia        Past Surgical History:   Procedure Laterality Date   • BILATERAL SALPINGO OOPHORECTOMY  2015    Dr. Reynolds   • BLEPHAROPLASTY Bilateral 2019    Procedure: BLEPHAROPLASTY;  Surgeon: Kye Guerrero MD;  Location: [facility] OR;  Service: Plastics   • BROW LIFT AND BLEPHAROPLASTY Bilateral 2019    Procedure: BROW LIFT AND  BILATERAL UPPER LID BLEPHAROPLASTY;  Surgeon: Kye Guerrero MD;  Location: [facility] OR;  Service: Plastics   •  SECTION     • COLONOSCOPY N/A 2016     Procedure: COLONOSCOPY;  Surgeon: Devon Campos MD;  Location: [facility];  Service:    • OOPHORECTOMY     • SUPRACERVICAL HYSTERECTOMY SALPINGO OOPHORECTOMY  05/21/2015    supracervical lázaro/ Rodolfo    • TUBAL ABDOMINAL LIGATION  1993       Social History     Tobacco Use   Smoking Status Never Smoker   Smokeless Tobacco Never Used       Family History   Problem Relation Age of Onset   • Rheum arthritis Maternal Aunt    • Hyperlipidemia Maternal Aunt    • Ovarian cancer Maternal Aunt    • Alzheimer's disease Paternal Grandmother    • Hyperlipidemia Maternal Grandmother    • Hyperlipidemia Mother    • Ovarian cancer Mother    • Hyperlipidemia Maternal Aunt    • Ovarian cancer Maternal Aunt    • Malig Hyperthermia Neg Hx        Prior to Admission medications    Medication Sig Start Date End Date Taking? Authorizing Provider   Ascorbic Acid (VITAMIN C PO) Take  by mouth Daily.   Yes Shante Collazo MD   Multiple Vitamin (MULTI-VITAMIN DAILY PO) Take  by mouth Daily.   Yes Shante Collazo MD   NON FORMULARY Testosterone/Versabase cream, 2%, using pea sized amount 2 to 3 times weekly.   Yes Shante Collazo MD   Zinc Gluconate (ZINC COLD THERAPY PO) Take  by mouth Daily.   Yes Shante Collazo MD   atorvastatin (LIPITOR) 10 MG tablet Take 10 mg by mouth Every Night.    Shante Collazo MD     ________________________________________    Current contraception: post menopausal status  History of abnormal Pap smear: no  Family history of uterine or ovarian cancer: no  Family History of colon cancer/colon polyps: no  History of abnormal mammogram: no  History of abnormal lipids: yes - No longer on medication per primary MD     The following portions of the patient's history were reviewed and updated as appropriate: allergies, current medications, past family history, past medical history, past social history, past surgical history and problem list.    Review of Systems    Pertinent  "items are noted in HPI.     Objective   Physical Exam    /82   Ht 160 cm (63\")   Wt 60.3 kg (133 lb)   LMP  (LMP Unknown)   Breastfeeding? No   BMI 23.56 kg/m²    BP Readings from Last 3 Encounters:   12/06/19 124/82   10/28/19 110/80   08/01/19 110/80      Wt Readings from Last 3 Encounters:   12/06/19 60.3 kg (133 lb)   10/28/19 63 kg (139 lb)   08/01/19 63 kg (139 lb)        BMI: Estimated body mass index is 23.56 kg/m² as calculated from the following:    Height as of this encounter: 160 cm (63\").    Weight as of this encounter: 60.3 kg (133 lb).       General: alert, appears stated age and cooperative   Heart: regular rate and rhythm, S1, S2 normal, no murmur, click, rub or gallop   Lungs: clear to auscultation bilaterally   Abdomen: soft, non-tender, without masses, no organomegaly   Breast: inspection negative, no nipple discharge or bleeding, no masses or nodularity palpable   Vulva: normal and bartholin's, Urethra, [place]'s normal   Vagina: normal mucosa, normal discharge   Cervix: no lesions   Uterus: Absent, prior supracervical hyst    Adnexa: normal adnexa     As part of wellness and prevention, the following topics were discussed with the patient:     []  Nutrition  []  Physical activity/regular exercise   []  Healthy weight  []  Injury prevention  []  Smoking cessation  []  Substance misuse/abuse  []  Sexual behavior  []  STD prevention  []  Contaception  []  Dental health  []  Mental health  []  Immunization  [x]  Encouraged SBE        Assessment:    Maricarmen was seen today for gynecologic exam.    Diagnoses and all orders for this visit:    Well female exam with routine gynecological exam  -     IgP, Aptima HPV    Vaginal Pap smear  -     IgP, Aptima HPV    Screening for blood or protein in urine  -     Cancel: POC Urinalysis Dipstick    Decreased libido          Plan:  Return in about 1 year (around 12/6/2020) for Annual GYN exam.      Vern Reynolds MD  12/6/2019 10:42 AM  "

## 2019-12-10 LAB
CYTOLOGIST CVX/VAG CYTO: NORMAL
CYTOLOGY CVX/VAG DOC CYTO: NORMAL
CYTOLOGY CVX/VAG DOC THIN PREP: NORMAL
DX ICD CODE: NORMAL
HIV 1 & 2 AB SER-IMP: NORMAL
HPV I/H RISK 4 DNA CVX QL PROBE+SIG AMP: NEGATIVE
OTHER STN SPEC: NORMAL
STAT OF ADQ CVX/VAG CYTO-IMP: NORMAL

## 2019-12-11 ENCOUNTER — TELEPHONE (OUTPATIENT)
Dept: OBSTETRICS AND GYNECOLOGY | Age: 54
End: 2019-12-11

## 2019-12-11 NOTE — TELEPHONE ENCOUNTER
----- Message from Vern Reynolds MD sent at 12/10/2019  6:55 PM EST -----  Call pt:  PAP is negative.

## 2020-02-18 ENCOUNTER — APPOINTMENT (OUTPATIENT)
Dept: PREADMISSION TESTING | Facility: HOSPITAL | Age: 55
End: 2020-02-18

## 2020-02-18 ENCOUNTER — HOSPITAL ENCOUNTER (OUTPATIENT)
Dept: GENERAL RADIOLOGY | Facility: HOSPITAL | Age: 55
End: 2020-02-18

## 2020-02-18 ENCOUNTER — HOSPITAL ENCOUNTER (OUTPATIENT)
Dept: GENERAL RADIOLOGY | Facility: HOSPITAL | Age: 55
Discharge: HOME | End: 2020-02-18
Admitting: ORTHOPAEDIC SURGERY

## 2020-02-18 ENCOUNTER — HOSPITAL ENCOUNTER (OUTPATIENT)
Dept: GENERAL RADIOLOGY | Facility: HOSPITAL | Age: 55
Discharge: HOME | End: 2020-02-18

## 2020-02-18 VITALS
TEMPERATURE: 98.4 F | BODY MASS INDEX: 24.15 KG/M2 | HEIGHT: 63 IN | WEIGHT: 136.31 LBS | DIASTOLIC BLOOD PRESSURE: 95 MMHG | RESPIRATION RATE: 16 BRPM | SYSTOLIC BLOOD PRESSURE: 142 MMHG | OXYGEN SATURATION: 100 % | HEART RATE: 79 BPM

## 2020-02-18 LAB
ALBUMIN SERPL-MCNC: 4.3 G/DL (ref 3.5–5.2)
ALBUMIN/GLOB SERPL: 1.7 G/DL
ALP SERPL-CCNC: 96 U/L (ref 39–117)
ALT SERPL W P-5'-P-CCNC: 27 U/L (ref 1–33)
ANION GAP SERPL CALCULATED.3IONS-SCNC: 10.9 MMOL/L (ref 5–15)
AST SERPL-CCNC: 22 U/L (ref 1–32)
BACTERIA UR QL AUTO: NORMAL /HPF
BILIRUB SERPL-MCNC: 0.5 MG/DL (ref 0.2–1.2)
BILIRUB UR QL STRIP: NEGATIVE
BUN BLD-MCNC: 12 MG/DL (ref 6–20)
BUN/CREAT SERPL: 17.1 (ref 7–25)
CALCIUM SPEC-SCNC: 9.4 MG/DL (ref 8.6–10.5)
CHLORIDE SERPL-SCNC: 99 MMOL/L (ref 98–107)
CLARITY UR: CLEAR
CO2 SERPL-SCNC: 29.1 MMOL/L (ref 22–29)
COLOR UR: YELLOW
CREAT BLD-MCNC: 0.7 MG/DL (ref 0.57–1)
DEPRECATED RDW RBC AUTO: 44.8 FL (ref 37–54)
ERYTHROCYTE [DISTWIDTH] IN BLOOD BY AUTOMATED COUNT: 13.1 % (ref 12.3–15.4)
GFR SERPL CREATININE-BSD FRML MDRD: 87 ML/MIN/1.73
GLOBULIN UR ELPH-MCNC: 2.5 GM/DL
GLUCOSE BLD-MCNC: 86 MG/DL (ref 65–99)
GLUCOSE UR STRIP-MCNC: NEGATIVE MG/DL
HCT VFR BLD AUTO: 43 % (ref 34–46.6)
HGB BLD-MCNC: 14.4 G/DL (ref 12–15.9)
HGB UR QL STRIP.AUTO: NEGATIVE
HYALINE CASTS UR QL AUTO: NORMAL /LPF
KETONES UR QL STRIP: NEGATIVE
LEUKOCYTE ESTERASE UR QL STRIP.AUTO: ABNORMAL
MCH RBC QN AUTO: 31.4 PG (ref 26.6–33)
MCHC RBC AUTO-ENTMCNC: 33.5 G/DL (ref 31.5–35.7)
MCV RBC AUTO: 93.7 FL (ref 79–97)
NITRITE UR QL STRIP: NEGATIVE
PH UR STRIP.AUTO: 7 [PH] (ref 5–8)
PLATELET # BLD AUTO: 217 10*3/MM3 (ref 140–450)
PMV BLD AUTO: 10.3 FL (ref 6–12)
POTASSIUM BLD-SCNC: 4.1 MMOL/L (ref 3.5–5.2)
PROT SERPL-MCNC: 6.8 G/DL (ref 6–8.5)
PROT UR QL STRIP: NEGATIVE
RBC # BLD AUTO: 4.59 10*6/MM3 (ref 3.77–5.28)
RBC # UR: NORMAL /HPF
REF LAB TEST METHOD: NORMAL
SODIUM BLD-SCNC: 139 MMOL/L (ref 136–145)
SP GR UR STRIP: 1.01 (ref 1–1.03)
SQUAMOUS #/AREA URNS HPF: NORMAL /HPF
UROBILINOGEN UR QL STRIP: ABNORMAL
WBC NRBC COR # BLD: 4.59 10*3/MM3 (ref 3.4–10.8)
WBC UR QL AUTO: NORMAL /HPF

## 2020-02-18 PROCEDURE — 81001 URINALYSIS AUTO W/SCOPE: CPT | Performed by: ORTHOPAEDIC SURGERY

## 2020-02-18 PROCEDURE — 73030 X-RAY EXAM OF SHOULDER: CPT

## 2020-02-18 PROCEDURE — 85027 COMPLETE CBC AUTOMATED: CPT | Performed by: ORTHOPAEDIC SURGERY

## 2020-02-18 PROCEDURE — 36415 COLL VENOUS BLD VENIPUNCTURE: CPT

## 2020-02-18 PROCEDURE — 93010 ELECTROCARDIOGRAM REPORT: CPT | Performed by: INTERNAL MEDICINE

## 2020-02-18 PROCEDURE — 93005 ELECTROCARDIOGRAM TRACING: CPT

## 2020-02-18 PROCEDURE — 71046 X-RAY EXAM CHEST 2 VIEWS: CPT

## 2020-02-18 PROCEDURE — 80053 COMPREHEN METABOLIC PANEL: CPT | Performed by: ORTHOPAEDIC SURGERY

## 2020-02-18 NOTE — DISCHARGE INSTRUCTIONS
NO medications the morning of surgery:        General Instructions:  • Do not eat solid food after midnight the night before surgery.  • You may drink clear liquids day of surgery but must stop at least one hour before your hospital arrival time.  • It is beneficial for you to have a clear drink that contains carbohydrates the day of surgery.  We suggest a 12 to 20 ounce bottle of Gatorade or Powerade for non-diabetic patients or a 12 to 20 ounce bottle of G2 or Powerade Zero for diabetic patients.     Clear liquids are liquids you can see through.  Nothing red in color.     Plain water                               Sports drinks  Sodas                                   Gelatin (Jell-O)  Fruit juices without pulp such as white grape juice and apple juice  Popsicles that contain no fruit or yogurt  Tea or coffee (no cream or milk added)  Gatorade / Powerade  G2 / Powerade Zero    • Patients who avoid smoking, chewing tobacco and alcohol for 4 weeks prior to surgery have a reduced risk of post-operative complications.  Quit smoking as many days before surgery as you can.  • Do not smoke, use chewing tobacco or drink alcohol the day of surgery.   • If applicable bring your C-PAP/ BI-PAP machine.  • Bring any papers given to you in the doctor’s office.  • Wear clean comfortable clothes.  • Do not wear contact lenses, false eyelashes or make-up.  Bring a case for your glasses.   • Bring crutches or walker if applicable.  • Remove all piercings.  Leave jewelry and any other valuables at home.  • Hair extensions with metal clips must be removed prior to surgery.  • The Pre-Admission Testing nurse will instruct you to bring medications if unable to obtain an accurate list in Pre-Admission Testing.      Preventing a Surgical Site Infection:  • For 2 to 3 days before surgery, avoid shaving with a razor because the razor can irritate skin and make it easier to develop an infection.    • Any areas of open skin can increase the  risk of a post-operative wound infection by allowing bacteria to enter and travel throughout the body.  Notify your surgeon if you have any skin wounds / rashes even if it is not near the expected surgical site.  The area will need assessed to determine if surgery should be delayed until it is healed.  • The night prior to surgery sleep in a clean bed with clean clothing.  Do not allow pets to sleep with you.  • Shower on the morning of surgery using a fresh bar of anti-bacterial soap (such as Dial) and clean washcloth.  Dry with a clean towel and dress in clean clothing.  • Ask your surgeon if you will be receiving antibiotics prior to surgery.  • Make sure you, your family, and all healthcare providers clean their hands with soap and water or an alcohol based hand  before caring for you or your wound.    Day of surgery:02- REPORT TO THE [facility] (YOU WILL BE CALLED AHEAD OF TIME WITH AN ARRIVAL TIME)  Your arrival time is approximately two hours before your scheduled surgery time.  Upon arrival, a Pre-op nurse and Anesthesiologist will review your health history, obtain vital signs, and answer questions you may have.  The only belongings needed at this time will be a list of your home medications and if applicable your C-PAP/BI-PAP machine.  If you are staying overnight your family can leave the rest of your belongings in the car and bring them to your room later.  A Pre-op nurse will start an IV and you may receive medication in preparation for surgery, including something to help you relax.  Your family will be able to see you in the Pre-op area.  Two visitors at a time will be allowed in the Pre-op room.  While you are in surgery your family should notify the waiting room  if they leave the waiting room area and provide a contact phone number.    Please be aware that surgery does come with discomfort.  We want to make every effort to control your discomfort so please discuss any  uncontrolled symptoms with your nurse.   Your doctor will most likely have prescribed pain medications.      If you are going home after surgery you will receive individualized written care instructions before being discharged.  A responsible adult must drive you to and from the hospital on the day of your surgery and stay with you for 24 hours.    If you are staying overnight following surgery, you will be transported to your hospital room following the recovery period.  [facility] has all private rooms.    If you have any questions please call Pre-Admission Testing at [phone].  Deductibles and co-payments are collected on the day of service. Please be prepared to pay the required co-pay, deductible or deposit on the day of service as defined by your plan.  2% CHLORHEXIDINE GLUCONATE* CLOTH  Preparing or “prepping” skin before surgery can reduce the risk of infection at the surgical site. To make the process easier, [facility] has chosen disposable cloths moistened with a rinse-free, 2% Chlorhexidine Gluconate (CHG) antiseptic solution. The steps below outline the prepping process and should be carefully followed.        Use the prep cloth on the area that is circled in the diagram             Directions Night before Surgery 02- PM PRIOR TO SURGERY (RIGHT SHOULDER)  1) Shower using a fresh bar of anti-bacterial soap (such as Dial) and clean washcloth.  Use a clean towel to completely dry your skin.  2) Do not use any lotions, oils or creams on your skin.  3) Open the package and remove 1 cloth, wipe your skin for 30 seconds in a circular motion.  Allow to dry for 3 minutes.  4) Repeat #3 with second cloth.  5) Do not touch your eyes, ears, or mouth with the prep cloth.  6) Allow the wet prep solution to air dry.  7) Discard the prep cloth and wash your hands with soap and water.   8) Dress in clean bed clothes and sleep on fresh clean bed sheets.   9) You may experience  some temporary itching after the prep.    Directions Day of Surgery 02- AM PRIOR TO SURGERY (RIGHT SHOULDER)  1) Repeat steps 1,2,3,4,5,6,7, and 9.   2) Dress in clean clothes before coming to the hospital.    BACTROBAN NASAL OINTMENT  There are many germs normally in your nose. Bactroban is an ointment that will help reduce these germs. Please follow these instructions for Bactroban use:      ____The day before surgery in the morning  [id number]_______    ____The day before surgery in the evening              [id number]_______    ____The day of surgery in the morning    [id number]_______    **Squirt ½ package of Bactroban Ointment onto a cotton applicator and apply to inside of 1st nostril.  Squirt the remaining Bactroban and apply to the inside of the other nostril.

## 2020-02-24 PROBLEM — Z96.611 S/P REVERSE TOTAL SHOULDER ARTHROPLASTY, RIGHT: Status: ACTIVE | Noted: 2020-02-24

## 2020-02-25 ENCOUNTER — ANESTHESIA (OUTPATIENT)
Dept: PERIOP | Facility: HOSPITAL | Age: 55
End: 2020-02-25

## 2020-02-25 ENCOUNTER — HOSPITAL ENCOUNTER (INPATIENT)
Facility: HOSPITAL | Age: 55
LOS: 1 days | Discharge: HOME | End: 2020-02-26
Attending: ORTHOPAEDIC SURGERY | Admitting: ORTHOPAEDIC SURGERY

## 2020-02-25 ENCOUNTER — APPOINTMENT (OUTPATIENT)
Dept: GENERAL RADIOLOGY | Facility: HOSPITAL | Age: 55
End: 2020-02-25

## 2020-02-25 ENCOUNTER — ANESTHESIA EVENT (OUTPATIENT)
Dept: PERIOP | Facility: HOSPITAL | Age: 55
End: 2020-02-25

## 2020-02-25 PROCEDURE — 0RRJ00Z REPLACEMENT OF RIGHT SHOULDER JOINT WITH REVERSE BALL AND SOCKET SYNTHETIC SUBSTITUTE, OPEN APPROACH: ICD-10-PCS | Performed by: ORTHOPAEDIC SURGERY

## 2020-02-25 PROCEDURE — 25010000002 DEXAMETHASONE PER 1 MG: Performed by: ANESTHESIOLOGY

## 2020-02-25 PROCEDURE — 25010000003 CEFAZOLIN IN DEXTROSE 2-4 GM/100ML-% SOLUTION: Performed by: ORTHOPAEDIC SURGERY

## 2020-02-25 PROCEDURE — 25010000002 PHENYLEPHRINE PER 1 ML: Performed by: NURSE ANESTHETIST, CERTIFIED REGISTERED

## 2020-02-25 PROCEDURE — 25010000002 PROPOFOL 10 MG/ML EMULSION: Performed by: NURSE ANESTHETIST, CERTIFIED REGISTERED

## 2020-02-25 PROCEDURE — 25010000002 DEXAMETHASONE PER 1 MG: Performed by: NURSE ANESTHETIST, CERTIFIED REGISTERED

## 2020-02-25 PROCEDURE — 25010000002 FENTANYL CITRATE (PF) 100 MCG/2ML SOLUTION: Performed by: ANESTHESIOLOGY

## 2020-02-25 PROCEDURE — 25010000002 MIDAZOLAM PER 1 MG: Performed by: ANESTHESIOLOGY

## 2020-02-25 PROCEDURE — 25010000003 BUPIVACAINE LIPOSOME 1.3 % SUSPENSION 10 ML VIAL: Performed by: ORTHOPAEDIC SURGERY

## 2020-02-25 PROCEDURE — 73030 X-RAY EXAM OF SHOULDER: CPT

## 2020-02-25 PROCEDURE — 25010000002 ONDANSETRON PER 1 MG: Performed by: NURSE ANESTHETIST, CERTIFIED REGISTERED

## 2020-02-25 PROCEDURE — C1713 ANCHOR/SCREW BN/BN,TIS/BN: HCPCS | Performed by: ORTHOPAEDIC SURGERY

## 2020-02-25 PROCEDURE — C1776 JOINT DEVICE (IMPLANTABLE): HCPCS | Performed by: ORTHOPAEDIC SURGERY

## 2020-02-25 PROCEDURE — 25010000002 NEOSTIGMINE PER 0.5 MG: Performed by: NURSE ANESTHETIST, CERTIFIED REGISTERED

## 2020-02-25 PROCEDURE — 0PB90ZZ EXCISION OF RIGHT CLAVICLE, OPEN APPROACH: ICD-10-PCS | Performed by: ORTHOPAEDIC SURGERY

## 2020-02-25 PROCEDURE — 25010000003 CEFAZOLIN PER 500 MG: Performed by: ORTHOPAEDIC SURGERY

## 2020-02-25 PROCEDURE — C9290 INJ, BUPIVACAINE LIPOSOME: HCPCS | Performed by: ORTHOPAEDIC SURGERY

## 2020-02-25 DEVICE — TOTL SHLDER REV: Type: IMPLANTABLE DEVICE | Site: SHOULDER | Status: FUNCTIONAL

## 2020-02-25 DEVICE — KT SHLDR DRL PIN SPG: Type: IMPLANTABLE DEVICE | Site: SHOULDER | Status: FUNCTIONAL

## 2020-02-25 DEVICE — LINER HUM REV TRABECULAR REV PA 7D 36 PL: Type: IMPLANTABLE DEVICE | Site: SHOULDER | Status: FUNCTIONAL

## 2020-02-25 DEVICE — INVERSE/REVERSE SCREW SYSTEM, 4.5-36
Type: IMPLANTABLE DEVICE | Site: SHOULDER | Status: FUNCTIONAL
Brand: INVERSE/REVERSE

## 2020-02-25 DEVICE — STEM HUM/SHLDR TRABECULARMETAL REV 10X130MM: Type: IMPLANTABLE DEVICE | Site: SHOULDER | Status: FUNCTIONAL

## 2020-02-25 DEVICE — GLENSPHR TRABECULARMETAL REV 36MM: Type: IMPLANTABLE DEVICE | Site: SHOULDER | Status: FUNCTIONAL

## 2020-02-25 DEVICE — BASEPLT GLEN TRABECULARMETAL REV 15MM: Type: IMPLANTABLE DEVICE | Site: SHOULDER | Status: FUNCTIONAL

## 2020-02-25 DEVICE — INVERSE/REVERSE SCREW SYSTEM, 4.5-33
Type: IMPLANTABLE DEVICE | Site: SHOULDER | Status: FUNCTIONAL
Brand: INVERSE/REVERSE

## 2020-02-25 RX ORDER — MAGNESIUM HYDROXIDE 1200 MG/15ML
LIQUID ORAL AS NEEDED
Status: DISCONTINUED | OUTPATIENT
Start: 2020-02-25 | End: 2020-02-25 | Stop reason: HOSPADM

## 2020-02-25 RX ORDER — BUPIVACAINE HYDROCHLORIDE AND EPINEPHRINE 5; 5 MG/ML; UG/ML
INJECTION, SOLUTION EPIDURAL; INTRACAUDAL; PERINEURAL
Status: COMPLETED | OUTPATIENT
Start: 2020-02-25 | End: 2020-02-25

## 2020-02-25 RX ORDER — MORPHINE SULFATE 2 MG/ML
2 INJECTION, SOLUTION INTRAMUSCULAR; INTRAVENOUS EVERY 4 HOURS PRN
Status: DISCONTINUED | OUTPATIENT
Start: 2020-02-25 | End: 2020-02-26 | Stop reason: HOSPADM

## 2020-02-25 RX ORDER — OXYCODONE AND ACETAMINOPHEN 7.5; 325 MG/1; MG/1
1 TABLET ORAL EVERY 4 HOURS PRN
Status: DISCONTINUED | OUTPATIENT
Start: 2020-02-25 | End: 2020-02-25 | Stop reason: SDUPTHER

## 2020-02-25 RX ORDER — SODIUM CHLORIDE, SODIUM LACTATE, POTASSIUM CHLORIDE, CALCIUM CHLORIDE 600; 310; 30; 20 MG/100ML; MG/100ML; MG/100ML; MG/100ML
9 INJECTION, SOLUTION INTRAVENOUS CONTINUOUS
Status: DISCONTINUED | OUTPATIENT
Start: 2020-02-25 | End: 2020-02-26 | Stop reason: HOSPADM

## 2020-02-25 RX ORDER — PROMETHAZINE HYDROCHLORIDE 25 MG/ML
6.25 INJECTION, SOLUTION INTRAMUSCULAR; INTRAVENOUS ONCE AS NEEDED
Status: DISCONTINUED | OUTPATIENT
Start: 2020-02-25 | End: 2020-02-25 | Stop reason: HOSPADM

## 2020-02-25 RX ORDER — HYDROMORPHONE HYDROCHLORIDE 1 MG/ML
0.5 INJECTION, SOLUTION INTRAMUSCULAR; INTRAVENOUS; SUBCUTANEOUS
Status: DISCONTINUED | OUTPATIENT
Start: 2020-02-25 | End: 2020-02-25 | Stop reason: HOSPADM

## 2020-02-25 RX ORDER — CEFAZOLIN SODIUM 2 G/100ML
2 INJECTION, SOLUTION INTRAVENOUS EVERY 8 HOURS
Status: COMPLETED | OUTPATIENT
Start: 2020-02-25 | End: 2020-02-26

## 2020-02-25 RX ORDER — PROMETHAZINE HYDROCHLORIDE 25 MG/1
25 SUPPOSITORY RECTAL ONCE AS NEEDED
Status: DISCONTINUED | OUTPATIENT
Start: 2020-02-25 | End: 2020-02-25 | Stop reason: HOSPADM

## 2020-02-25 RX ORDER — PROPOFOL 10 MG/ML
VIAL (ML) INTRAVENOUS AS NEEDED
Status: DISCONTINUED | OUTPATIENT
Start: 2020-02-25 | End: 2020-02-25 | Stop reason: SURG

## 2020-02-25 RX ORDER — PROMETHAZINE HYDROCHLORIDE 25 MG/1
25 TABLET ORAL ONCE AS NEEDED
Status: DISCONTINUED | OUTPATIENT
Start: 2020-02-25 | End: 2020-02-25 | Stop reason: HOSPADM

## 2020-02-25 RX ORDER — ONDANSETRON 2 MG/ML
4 INJECTION INTRAMUSCULAR; INTRAVENOUS EVERY 6 HOURS PRN
Status: DISCONTINUED | OUTPATIENT
Start: 2020-02-25 | End: 2020-02-26 | Stop reason: HOSPADM

## 2020-02-25 RX ORDER — FLUMAZENIL 0.1 MG/ML
0.2 INJECTION INTRAVENOUS AS NEEDED
Status: DISCONTINUED | OUTPATIENT
Start: 2020-02-25 | End: 2020-02-25 | Stop reason: HOSPADM

## 2020-02-25 RX ORDER — GLYCOPYRROLATE 0.2 MG/ML
INJECTION INTRAMUSCULAR; INTRAVENOUS AS NEEDED
Status: DISCONTINUED | OUTPATIENT
Start: 2020-02-25 | End: 2020-02-25 | Stop reason: SURG

## 2020-02-25 RX ORDER — DIPHENHYDRAMINE HCL 25 MG
25 CAPSULE ORAL EVERY 6 HOURS PRN
Status: DISCONTINUED | OUTPATIENT
Start: 2020-02-25 | End: 2020-02-26 | Stop reason: HOSPADM

## 2020-02-25 RX ORDER — DEXAMETHASONE SODIUM PHOSPHATE 4 MG/ML
INJECTION, SOLUTION INTRA-ARTICULAR; INTRALESIONAL; INTRAMUSCULAR; INTRAVENOUS; SOFT TISSUE
Status: COMPLETED | OUTPATIENT
Start: 2020-02-25 | End: 2020-02-25

## 2020-02-25 RX ORDER — FENTANYL CITRATE 50 UG/ML
50 INJECTION, SOLUTION INTRAMUSCULAR; INTRAVENOUS
Status: DISCONTINUED | OUTPATIENT
Start: 2020-02-25 | End: 2020-02-25 | Stop reason: HOSPADM

## 2020-02-25 RX ORDER — BUPIVACAINE HYDROCHLORIDE 2.5 MG/ML
INJECTION, SOLUTION EPIDURAL; INFILTRATION; INTRACAUDAL
Status: COMPLETED | OUTPATIENT
Start: 2020-02-25 | End: 2020-02-25

## 2020-02-25 RX ORDER — MIDAZOLAM HYDROCHLORIDE 1 MG/ML
1 INJECTION INTRAMUSCULAR; INTRAVENOUS
Status: DISCONTINUED | OUTPATIENT
Start: 2020-02-25 | End: 2020-02-25 | Stop reason: HOSPADM

## 2020-02-25 RX ORDER — OXYCODONE HYDROCHLORIDE AND ACETAMINOPHEN 5; 325 MG/1; MG/1
1 TABLET ORAL EVERY 4 HOURS PRN
Status: DISCONTINUED | OUTPATIENT
Start: 2020-02-25 | End: 2020-02-26 | Stop reason: HOSPADM

## 2020-02-25 RX ORDER — HYDROCODONE BITARTRATE AND ACETAMINOPHEN 7.5; 325 MG/1; MG/1
1 TABLET ORAL ONCE AS NEEDED
Status: DISCONTINUED | OUTPATIENT
Start: 2020-02-25 | End: 2020-02-25 | Stop reason: HOSPADM

## 2020-02-25 RX ORDER — OXYCODONE AND ACETAMINOPHEN 10; 325 MG/1; MG/1
1 TABLET ORAL EVERY 4 HOURS PRN
Status: DISCONTINUED | OUTPATIENT
Start: 2020-02-25 | End: 2020-02-26

## 2020-02-25 RX ORDER — CEFAZOLIN SODIUM 2 G/100ML
2 INJECTION, SOLUTION INTRAVENOUS ONCE
Status: COMPLETED | OUTPATIENT
Start: 2020-02-25 | End: 2020-02-25

## 2020-02-25 RX ORDER — ONDANSETRON 4 MG/1
4 TABLET, FILM COATED ORAL EVERY 6 HOURS PRN
Status: DISCONTINUED | OUTPATIENT
Start: 2020-02-25 | End: 2020-02-26 | Stop reason: HOSPADM

## 2020-02-25 RX ORDER — LIDOCAINE HYDROCHLORIDE 20 MG/ML
INJECTION, SOLUTION INFILTRATION; PERINEURAL AS NEEDED
Status: DISCONTINUED | OUTPATIENT
Start: 2020-02-25 | End: 2020-02-25 | Stop reason: SURG

## 2020-02-25 RX ORDER — NALOXONE HCL 0.4 MG/ML
0.4 VIAL (ML) INJECTION
Status: DISCONTINUED | OUTPATIENT
Start: 2020-02-25 | End: 2020-02-26 | Stop reason: HOSPADM

## 2020-02-25 RX ORDER — PROMETHAZINE HYDROCHLORIDE 25 MG/ML
12.5 INJECTION, SOLUTION INTRAMUSCULAR; INTRAVENOUS EVERY 4 HOURS PRN
Status: DISCONTINUED | OUTPATIENT
Start: 2020-02-25 | End: 2020-02-26 | Stop reason: HOSPADM

## 2020-02-25 RX ORDER — ROCURONIUM BROMIDE 10 MG/ML
INJECTION, SOLUTION INTRAVENOUS AS NEEDED
Status: DISCONTINUED | OUTPATIENT
Start: 2020-02-25 | End: 2020-02-25 | Stop reason: SURG

## 2020-02-25 RX ORDER — DEXAMETHASONE SODIUM PHOSPHATE 10 MG/ML
INJECTION INTRAMUSCULAR; INTRAVENOUS AS NEEDED
Status: DISCONTINUED | OUTPATIENT
Start: 2020-02-25 | End: 2020-02-25 | Stop reason: SURG

## 2020-02-25 RX ORDER — ONDANSETRON 2 MG/ML
4 INJECTION INTRAMUSCULAR; INTRAVENOUS ONCE AS NEEDED
Status: DISCONTINUED | OUTPATIENT
Start: 2020-02-25 | End: 2020-02-25 | Stop reason: HOSPADM

## 2020-02-25 RX ORDER — DIAZEPAM 5 MG/1
5 TABLET ORAL EVERY 6 HOURS PRN
Status: DISCONTINUED | OUTPATIENT
Start: 2020-02-25 | End: 2020-02-26 | Stop reason: HOSPADM

## 2020-02-25 RX ORDER — HYDRALAZINE HYDROCHLORIDE 20 MG/ML
5 INJECTION INTRAMUSCULAR; INTRAVENOUS
Status: DISCONTINUED | OUTPATIENT
Start: 2020-02-25 | End: 2020-02-25 | Stop reason: HOSPADM

## 2020-02-25 RX ORDER — ONDANSETRON 2 MG/ML
INJECTION INTRAMUSCULAR; INTRAVENOUS AS NEEDED
Status: DISCONTINUED | OUTPATIENT
Start: 2020-02-25 | End: 2020-02-25 | Stop reason: SURG

## 2020-02-25 RX ORDER — SODIUM CHLORIDE 0.9 % (FLUSH) 0.9 %
3 SYRINGE (ML) INJECTION EVERY 12 HOURS SCHEDULED
Status: DISCONTINUED | OUTPATIENT
Start: 2020-02-25 | End: 2020-02-25 | Stop reason: HOSPADM

## 2020-02-25 RX ORDER — LIDOCAINE HYDROCHLORIDE 10 MG/ML
0.5 INJECTION, SOLUTION EPIDURAL; INFILTRATION; INTRACAUDAL; PERINEURAL ONCE AS NEEDED
Status: COMPLETED | OUTPATIENT
Start: 2020-02-25 | End: 2020-02-25

## 2020-02-25 RX ORDER — WOUND DRESSING ADHESIVE - LIQUID
LIQUID MISCELLANEOUS AS NEEDED
Status: DISCONTINUED | OUTPATIENT
Start: 2020-02-25 | End: 2020-02-25 | Stop reason: HOSPADM

## 2020-02-25 RX ORDER — SODIUM CHLORIDE 0.9 % (FLUSH) 0.9 %
3-10 SYRINGE (ML) INJECTION AS NEEDED
Status: DISCONTINUED | OUTPATIENT
Start: 2020-02-25 | End: 2020-02-25 | Stop reason: HOSPADM

## 2020-02-25 RX ORDER — MIDAZOLAM HYDROCHLORIDE 1 MG/ML
2 INJECTION INTRAMUSCULAR; INTRAVENOUS
Status: DISCONTINUED | OUTPATIENT
Start: 2020-02-25 | End: 2020-02-25 | Stop reason: HOSPADM

## 2020-02-25 RX ORDER — EPHEDRINE SULFATE 50 MG/ML
5 INJECTION, SOLUTION INTRAVENOUS ONCE AS NEEDED
Status: DISCONTINUED | OUTPATIENT
Start: 2020-02-25 | End: 2020-02-25 | Stop reason: HOSPADM

## 2020-02-25 RX ORDER — FENTANYL CITRATE 50 UG/ML
100 INJECTION, SOLUTION INTRAMUSCULAR; INTRAVENOUS
Status: DISCONTINUED | OUTPATIENT
Start: 2020-02-25 | End: 2020-02-25 | Stop reason: HOSPADM

## 2020-02-25 RX ORDER — ZOLPIDEM TARTRATE 5 MG/1
5 TABLET ORAL NIGHTLY PRN
Status: DISCONTINUED | OUTPATIENT
Start: 2020-02-25 | End: 2020-02-26 | Stop reason: HOSPADM

## 2020-02-25 RX ORDER — FAMOTIDINE 10 MG/ML
20 INJECTION, SOLUTION INTRAVENOUS ONCE
Status: COMPLETED | OUTPATIENT
Start: 2020-02-25 | End: 2020-02-25

## 2020-02-25 RX ADMIN — BUPIVACAINE HYDROCHLORIDE 10 ML: 2.5 INJECTION, SOLUTION EPIDURAL; INFILTRATION; INTRACAUDAL; PERINEURAL at 08:43

## 2020-02-25 RX ADMIN — GLYCOPYRROLATE 0.5 MG: 0.2 INJECTION INTRAMUSCULAR; INTRAVENOUS at 10:58

## 2020-02-25 RX ADMIN — PHENYLEPHRINE HYDROCHLORIDE 100 MCG: 10 INJECTION INTRAVENOUS at 10:31

## 2020-02-25 RX ADMIN — PHENYLEPHRINE HYDROCHLORIDE 100 MCG: 10 INJECTION INTRAVENOUS at 10:51

## 2020-02-25 RX ADMIN — DEXAMETHASONE SODIUM PHOSPHATE 4 MG: 4 INJECTION INTRA-ARTICULAR; INTRALESIONAL; INTRAMUSCULAR; INTRAVENOUS; SOFT TISSUE at 08:43

## 2020-02-25 RX ADMIN — FAMOTIDINE 20 MG: 10 INJECTION INTRAVENOUS at 08:28

## 2020-02-25 RX ADMIN — CEFAZOLIN SODIUM 2 G: 2 INJECTION, SOLUTION INTRAVENOUS at 09:35

## 2020-02-25 RX ADMIN — PHENYLEPHRINE HYDROCHLORIDE 100 MCG: 10 INJECTION INTRAVENOUS at 10:14

## 2020-02-25 RX ADMIN — MIDAZOLAM 1 MG: 1 INJECTION INTRAMUSCULAR; INTRAVENOUS at 08:32

## 2020-02-25 RX ADMIN — SODIUM CHLORIDE, POTASSIUM CHLORIDE, SODIUM LACTATE AND CALCIUM CHLORIDE 9 ML/HR: 600; 310; 30; 20 INJECTION, SOLUTION INTRAVENOUS at 08:05

## 2020-02-25 RX ADMIN — LIDOCAINE HYDROCHLORIDE 0.5 ML: 10 INJECTION, SOLUTION EPIDURAL; INFILTRATION; INTRACAUDAL; PERINEURAL at 08:05

## 2020-02-25 RX ADMIN — OXYCODONE HYDROCHLORIDE AND ACETAMINOPHEN 1 TABLET: 5; 325 TABLET ORAL at 21:09

## 2020-02-25 RX ADMIN — PHENYLEPHRINE HYDROCHLORIDE 50 MCG: 10 INJECTION INTRAVENOUS at 10:01

## 2020-02-25 RX ADMIN — ONDANSETRON HYDROCHLORIDE 4 MG: 2 SOLUTION INTRAMUSCULAR; INTRAVENOUS at 10:41

## 2020-02-25 RX ADMIN — LIDOCAINE HYDROCHLORIDE 80 MG: 20 INJECTION, SOLUTION INFILTRATION; PERINEURAL at 09:29

## 2020-02-25 RX ADMIN — ROCURONIUM BROMIDE 40 MG: 10 INJECTION, SOLUTION INTRAVENOUS at 09:29

## 2020-02-25 RX ADMIN — BUPIVACAINE HYDROCHLORIDE AND EPINEPHRINE BITARTRATE 10 ML: 5; .0091 INJECTION, SOLUTION EPIDURAL; INTRACAUDAL; PERINEURAL at 08:43

## 2020-02-25 RX ADMIN — Medication 3.5 MG: at 10:58

## 2020-02-25 RX ADMIN — ROCURONIUM BROMIDE 10 MG: 10 INJECTION, SOLUTION INTRAVENOUS at 10:14

## 2020-02-25 RX ADMIN — FENTANYL CITRATE 100 MCG: 50 INJECTION, SOLUTION INTRAMUSCULAR; INTRAVENOUS at 08:33

## 2020-02-25 RX ADMIN — DEXAMETHASONE SODIUM PHOSPHATE 8 MG: 10 INJECTION INTRAMUSCULAR; INTRAVENOUS at 09:40

## 2020-02-25 RX ADMIN — PHENYLEPHRINE HYDROCHLORIDE 100 MCG: 10 INJECTION INTRAVENOUS at 10:23

## 2020-02-25 RX ADMIN — PHENYLEPHRINE HYDROCHLORIDE 50 MCG: 10 INJECTION INTRAVENOUS at 10:08

## 2020-02-25 RX ADMIN — PROPOFOL 140 MG: 10 INJECTION, EMULSION INTRAVENOUS at 09:29

## 2020-02-25 RX ADMIN — CEFAZOLIN SODIUM 2 G: 2 INJECTION, SOLUTION INTRAVENOUS at 16:40

## 2020-02-25 NOTE — ANESTHESIA POSTPROCEDURE EVALUATION
Patient: Maricarmen Hayes    Procedure Summary     Date:  02/25/20 Room / Location:  [facility] OR [address] [place] [address] [facility] OR [facility] [place]    Anesthesia Start:  0924 Anesthesia Stop:  1112    Procedure:  RIGHT TOTAL SHOULDER REVERSE ARTHROPLASTY WITH DISTAL CLAVICLE EXCISION (Right Shoulder) Diagnosis:      Surgeon:  Humberto Up MD Provider:  Krishna Estrada MD    Anesthesia Type:  general ASA Status:  2          Anesthesia Type: general    Vitals  Vitals Value Taken Time   /73 2/25/2020  1:00 PM   Temp 36.6 °C (97.8 °F) 2/25/2020 11:10 AM   Pulse 77 2/25/2020  1:10 PM   Resp 14 2/25/2020 12:45 PM   SpO2 97 % 2/25/2020  1:10 PM   Vitals shown include unvalidated device data.        Post Anesthesia Care and Evaluation    Patient location during evaluation: bedside  Patient participation: complete - patient participated  Level of consciousness: awake  Pain score: 1  Pain management: adequate  Airway patency: patent  Anesthetic complications: No anesthetic complications  PONV Status: controlled  Cardiovascular status: acceptable  Respiratory status: acceptable  Hydration status: acceptable    Comments: --------------------            02/25/20               1245     --------------------   BP:       100/72     Pulse:      75       Resp:       14       Temp:                SpO2:      94%      --------------------

## 2020-02-25 NOTE — ANESTHESIA PROCEDURE NOTES
Airway  Urgency: elective    Date/Time: 2/25/2020 9:31 AM  Airway not difficult    General Information and Staff    Patient location during procedure: OR  CRNA: Carolyn Serrano CRNA    Indications and Patient Condition  Indications for airway management: airway protection    Preoxygenated: yes  Mask difficulty assessment: 1 - vent by mask    Final Airway Details  Final airway type: endotracheal airway      Successful airway: ETT  Cuffed: yes   Successful intubation technique: direct laryngoscopy  Endotracheal tube insertion site: oral  Blade: Iesha  Blade size: 3  ETT size (mm): 7.0  Cormack-Lehane Classification: grade I - full view of glottis  Placement verified by: chest auscultation and capnometry   Measured from: lips  ETT/EBT  to lips (cm): 21  Number of attempts at approach: 1  Assessment: lips, teeth, and gum same as pre-op and atraumatic intubation    Additional Comments   ett cuff up at MOP

## 2020-02-25 NOTE — ANESTHESIA PREPROCEDURE EVALUATION
Anesthesia Evaluation     Patient summary reviewed and Nursing notes reviewed   NPO Solid Status: > 8 hours             Airway   Mallampati: II  TM distance: >3 FB  Neck ROM: full  no difficulty expected  Dental - normal exam     Pulmonary - negative pulmonary ROS and normal exam   Cardiovascular - normal exam    (+) hyperlipidemia,       Neuro/Psych- negative ROS  GI/Hepatic/Renal/Endo - negative ROS     Musculoskeletal (-) negative ROS    Abdominal  - normal exam   Substance History - negative use     OB/GYN negative ob/gyn ROS         Other                        Anesthesia Plan    ASA 2     general   (Isb popc)  intravenous induction     Anesthetic plan, all risks, benefits, and alternatives have been provided, discussed and informed consent has been obtained with: patient.    Plan discussed with CRNA.

## 2020-02-25 NOTE — ANESTHESIA PROCEDURE NOTES
Peripheral Block    Pre-sedation assessment completed: 2/25/2020 8:32 AM    Patient reassessed immediately prior to procedure    Patient location during procedure: pre-op  Start time: 2/25/2020 8:32 AM  Stop time: 2/25/2020 8:40 AM  Reason for block: at surgeon's request and post-op pain management  Performed by  Anesthesiologist: Krishna Estrada MD  Preanesthetic Checklist  Completed: patient identified, site marked, surgical consent, pre-op evaluation, timeout performed, IV checked, risks and benefits discussed and monitors and equipment checked  Prep:  Pt Position: supine  Sterile barriers:cap, gloves, mask and sterile barriers  Prep: ChloraPrep  Patient monitoring: blood pressure monitoring, continuous pulse oximetry and EKG  Procedure  Sedation:yes  Performed under: local infiltration  Guidance:ultrasound guided  ULTRASOUND INTERPRETATION.  Using ultrasound guidance a 22 G gauge needle was placed in close proximity to the brachial plexus nerve, at which point, under ultrasound guidance anesthetic was injected in the area of the nerve and spread of the anesthesia was seen on ultrasound in close proximity thereto.  There were no abnormalities seen on ultrasound; a digital image was taken; and the patient tolerated the procedure with no complications. Images:still images obtained    Laterality:right  Block Type:interscalene  Injection Technique:single-shot  Needle Type:echogenic  Needle Gauge:22 G  Resistance on Injection: less than 15 psi    Medications Used: dexamethasone (DECADRON) injection, 4 mg  bupivacaine PF (MARCAINE) 0.25 % injection, 10 mL  bupivacaine-EPINEPHrine PF (MARCAINE w/EPI) 0.5% -[id number] injection, 10 mL  Med admintered at 2/25/2020 8:43 AM      Post Assessment  Injection Assessment: negative aspiration for heme, no paresthesia on injection and incremental injection  Patient Tolerance:comfortable throughout block  Complications:no

## 2020-02-26 VITALS
OXYGEN SATURATION: 96 % | BODY MASS INDEX: 24.14 KG/M2 | RESPIRATION RATE: 16 BRPM | HEIGHT: 63 IN | DIASTOLIC BLOOD PRESSURE: 74 MMHG | SYSTOLIC BLOOD PRESSURE: 115 MMHG | HEART RATE: 75 BPM | WEIGHT: 136.24 LBS | TEMPERATURE: 97.5 F

## 2020-02-26 LAB
ANION GAP SERPL CALCULATED.3IONS-SCNC: 12 MMOL/L (ref 5–15)
BUN BLD-MCNC: 10 MG/DL (ref 6–20)
BUN/CREAT SERPL: 16.1 (ref 7–25)
CALCIUM SPEC-SCNC: 9 MG/DL (ref 8.6–10.5)
CHLORIDE SERPL-SCNC: 101 MMOL/L (ref 98–107)
CO2 SERPL-SCNC: 25 MMOL/L (ref 22–29)
CREAT BLD-MCNC: 0.62 MG/DL (ref 0.57–1)
GFR SERPL CREATININE-BSD FRML MDRD: 100 ML/MIN/1.73
GLUCOSE BLD-MCNC: 128 MG/DL (ref 65–99)
HCT VFR BLD AUTO: 38.8 % (ref 34–46.6)
HGB BLD-MCNC: 12.9 G/DL (ref 12–15.9)
POTASSIUM BLD-SCNC: 3.6 MMOL/L (ref 3.5–5.2)
SODIUM BLD-SCNC: 138 MMOL/L (ref 136–145)

## 2020-02-26 PROCEDURE — 25010000002 ENOXAPARIN PER 10 MG: Performed by: ORTHOPAEDIC SURGERY

## 2020-02-26 PROCEDURE — 85014 HEMATOCRIT: CPT | Performed by: ORTHOPAEDIC SURGERY

## 2020-02-26 PROCEDURE — 25010000002 MORPHINE PER 10 MG: Performed by: ORTHOPAEDIC SURGERY

## 2020-02-26 PROCEDURE — 85018 HEMOGLOBIN: CPT | Performed by: ORTHOPAEDIC SURGERY

## 2020-02-26 PROCEDURE — 25010000003 CEFAZOLIN IN DEXTROSE 2-4 GM/100ML-% SOLUTION: Performed by: ORTHOPAEDIC SURGERY

## 2020-02-26 PROCEDURE — 25010000002 KETOROLAC TROMETHAMINE PER 15 MG: Performed by: NURSE PRACTITIONER

## 2020-02-26 PROCEDURE — 80048 BASIC METABOLIC PNL TOTAL CA: CPT | Performed by: ORTHOPAEDIC SURGERY

## 2020-02-26 RX ORDER — OXYCODONE AND ACETAMINOPHEN 10; 325 MG/1; MG/1
2 TABLET ORAL EVERY 4 HOURS PRN
Status: DISCONTINUED | OUTPATIENT
Start: 2020-02-26 | End: 2020-02-26 | Stop reason: HOSPADM

## 2020-02-26 RX ORDER — KETOROLAC TROMETHAMINE 30 MG/ML
15 INJECTION, SOLUTION INTRAMUSCULAR; INTRAVENOUS ONCE
Status: COMPLETED | OUTPATIENT
Start: 2020-02-26 | End: 2020-02-26

## 2020-02-26 RX ADMIN — CEFAZOLIN SODIUM 2 G: 2 INJECTION, SOLUTION INTRAVENOUS at 10:01

## 2020-02-26 RX ADMIN — OXYCODONE HYDROCHLORIDE AND ACETAMINOPHEN 1 TABLET: 10; 325 TABLET ORAL at 02:23

## 2020-02-26 RX ADMIN — OXYCODONE HYDROCHLORIDE AND ACETAMINOPHEN 2 TABLET: 10; 325 TABLET ORAL at 06:44

## 2020-02-26 RX ADMIN — ENOXAPARIN SODIUM 40 MG: 40 INJECTION SUBCUTANEOUS at 09:30

## 2020-02-26 RX ADMIN — MORPHINE SULFATE 2 MG: 2 INJECTION, SOLUTION INTRAMUSCULAR; INTRAVENOUS at 03:40

## 2020-02-26 RX ADMIN — OXYCODONE HYDROCHLORIDE AND ACETAMINOPHEN 1 TABLET: 10; 325 TABLET ORAL at 15:52

## 2020-02-26 RX ADMIN — KETOROLAC TROMETHAMINE 15 MG: 30 INJECTION, SOLUTION INTRAMUSCULAR at 06:45

## 2020-02-26 RX ADMIN — CEFAZOLIN SODIUM 2 G: 2 INJECTION, SOLUTION INTRAVENOUS at 00:16

## 2020-02-26 RX ADMIN — OXYCODONE HYDROCHLORIDE AND ACETAMINOPHEN 2 TABLET: 10; 325 TABLET ORAL at 11:45

## 2020-02-27 ENCOUNTER — TRANSCRIBE ORDERS (OUTPATIENT)
Dept: PHYSICAL THERAPY | Facility: CLINIC | Age: 55
End: 2020-02-27

## 2020-02-27 ENCOUNTER — TRANSITIONAL CARE MANAGEMENT TELEPHONE ENCOUNTER (OUTPATIENT)
Dept: FAMILY MEDICINE CLINIC | Facility: CLINIC | Age: 55
End: 2020-02-27

## 2020-02-27 DIAGNOSIS — Z98.890 STATUS POST ARTHROSCOPY OF RIGHT SHOULDER: Primary | ICD-10-CM

## 2020-02-27 NOTE — OUTREACH NOTE
Spoke with pt, she sounds great and states she is doing quite well s/p Rt Reverse Total Shoulder with distal clavicle excision. Pain has greatly subsided and well managed with Percocet as directed or less. Her appetite is good. No nausea, fever, chills, SOB. No bowel issues. She slept well last night. Starts outpt P.T. Tomorrow. Confirms receipt and understanding of d/c orders and medications. Pt declines to sched TCM hosp fwp at this time, but states she will keep annual physical with Dr Mishra coming up in Spring.

## 2020-02-28 ENCOUNTER — TREATMENT (OUTPATIENT)
Dept: PHYSICAL THERAPY | Facility: CLINIC | Age: 55
End: 2020-02-28

## 2020-02-28 DIAGNOSIS — M25.511 ACUTE PAIN OF RIGHT SHOULDER: Primary | ICD-10-CM

## 2020-02-28 DIAGNOSIS — Z98.890 HISTORY OF REVERSE TOTAL REPLACEMENT OF RIGHT SHOULDER JOINT: ICD-10-CM

## 2020-02-28 DIAGNOSIS — M25.611 SHOULDER STIFFNESS, RIGHT: ICD-10-CM

## 2020-02-28 PROCEDURE — 97140 MANUAL THERAPY 1/> REGIONS: CPT | Performed by: PHYSICAL THERAPIST

## 2020-02-28 PROCEDURE — 97110 THERAPEUTIC EXERCISES: CPT | Performed by: PHYSICAL THERAPIST

## 2020-02-28 PROCEDURE — 97161 PT EVAL LOW COMPLEX 20 MIN: CPT | Performed by: PHYSICAL THERAPIST

## 2020-02-28 NOTE — PATIENT INSTRUCTIONS
Access Code: [email or web address]   URL: [email or web address]   Date: 02/28/2020   Prepared by: Tony Reynolds     Exercises   Circular Shoulder Pendulum with Table Support - 10 reps - 3 sets - 1x daily - 7x weekly   Horizontal Shoulder Pendulum with Table Support - 10 reps - 3 sets - 1x daily - 7x weekly   Flexion-Extension Shoulder Pendulum with Table Support - 10 reps - 3 sets - 1x daily - 7x weekly   Seated Shoulder External Rotation PROM on Table - 10 reps - 3 sets - 1x daily - 7x weekly   Standing Shoulder Flexion AAROM with Swiss Ball - 10 reps - 3 sets - 1x daily - 7x weekly   Seated Shoulder Scaption Slide at Table Top with Forearm in Neutral - 10 reps - 3 sets - 1x daily - 7x weekly

## 2020-02-28 NOTE — PROGRESS NOTES
Physical Therapy Initial Evaluation and Plan of Care      Patient: Maricarmen Hayes   : 1965  Diagnosis/ICD-10 Code:  Acute pain of right shoulder [M25.511]  Referring practitioner: Humberto Up MD  Date of Initial Visit: 2020  Today's Date: 2020          Subjective Evaluation    History of Present Illness  Date of surgery: 2020  Mechanism of injury: RIGHT TOTAL SHOULDER REVERSE ARTHROPLASTY WITH DISTAL CLAVICLE EXCISION on 19. Patient has been given HEP from hospital which she has done 2x per day. Here for evalution and treatment of R shoulder post surgery.   Surgery performed by DR. UP.     Pain  Current pain ratin  At worst pain ratin  Location: R shoulder   Quality: dull ache  Relieving factors: relaxation, rest and ice  Exacerbated by: any movement of the arm.    Hand dominance: right    Treatments  Treatments tried: Currently on oxy pain med PRN.  Patient Goals  Patient goals for therapy: increased motion, decreased pain, increased strength, independence with ADLs/IADLs and return to sport/leisure activities           Quick DASH 95.45   46-54 80-99% CM       Objective       Palpation     Right Tenderness of the upper trapezius.     Tenderness     Additional Tenderness Details  Salome incision tenderness     Cervical/Thoracic Screen   Cervical range of motion within normal limits with the following exceptions: No pain with neck AROM rotation    Neurological Testing     Sensation     Shoulder   Left Shoulder   Intact: light touch    Right Shoulder   Intact: light touch    Active Range of Motion     Additional Active Range of Motion Details  AROM not tested due to post op status      Passive Range of Motion     Right Shoulder   Flexion: 110 degrees   Extension: 0 degrees   Abduction: 90 degrees   External rotation 0°: 0 degrees   Internal rotation 0°: Right shoulder passive internal rotation at 0 degrees: to body.     Strength/Myotome Testing     Additional Strength  Details  Strength not tested due to post op status   strength WFL         Assessment & Plan     Assessment  Impairments: abnormal muscle tone, abnormal or restricted ROM, activity intolerance, impaired physical strength, lacks appropriate home exercise program and pain with function  Assessment details: Patient presents post op R RTSR on 2/25/20. Patient presents as expected given timeline on this date. Surgical site appears to be healing well (through bandage), with normal site swelling and bruising. Patient has ROM loss, weakness, and pain with normal motions. Pt would benefit from skilled PT services in order to address listed impairments; progress post op protocol, and increase tolerance to normal daily activities including ADLs, work and recreational activities.      Prognosis: good  Prognosis details:   GOALS  1. Be independent with initial HEP  2. Report pain </= 4/10 with all ADL's  3. Demonstrate improved right GH flexion PROM >/= 135 deg  4. Demonstrate improved right GH abduction PROM >/= 110 deg  5. Demonstrate improved right GH ER PROM >/= 30 deg    6 weeks. Pt will:  1. Report pain of </= 3/10 with all ADLs  2. Demonstrate improved right UE MMT of >/= 4-/5 based on functional assessment of movements.  3. Demonstrate improved right GH AROM fexion of >/= 160 deg  4. Demonstrate improved right GH AROM Abduction of >/= 140 deg  5. QuickDASH </= 70%, showing a significant change    Functional Limitations: carrying objects, lifting, pulling, pushing, uncomfortable because of pain, reaching behind back, reaching overhead and unable to perform repetitive tasks  Plan  Therapy options: will be seen for skilled physical therapy services  Planned modality interventions: traction, thermotherapy (hydrocollator packs), iontophoresis, ultrasound, TENS, cryotherapy, electrical stimulation/Russian stimulation and high voltage pulsed current (pain management)  Planned therapy interventions: abdominal trunk  stabilization, ADL retraining, joint mobilization, IADL retraining, home exercise program, functional ROM exercises, flexibility, fine motor coordination training, body mechanics training, balance/weight-bearing training, manual therapy, motor coordination training, transfer training, therapeutic activities, stretching, strengthening, spinal/joint mobilization, soft tissue mobilization, postural training and neuromuscular re-education  Frequency: 2x week  Duration in weeks: 10  Treatment plan discussed with: patient  Plan details: Initial HEP given on this date.         Manual Therapy:    12     mins  97442;  Therapeutic Exercise:    12     mins  48098;     Neuromuscular Carol:        mins  60593;    Therapeutic Activity:          mins  39564;     Gait Training:           mins  96275;     Ultrasound:          mins  27720;    Electrical Stimulation:         mins  56156 ( );  Dry Needling          mins self-pay    Timed Treatment:   24   mins   Total Treatment:     50   mins    PT SIGNATURE: Tony Reynolds PT [facility]   [place] License # [id number]  DATE TREATMENT INITIATED: 2/28/2020    Initial Certification  Certification Period: 5/28/2020  I certify that the therapy services are furnished while this patient is under my care.  The services outlined above are required by this patient, and will be reviewed every 90 days.     PHYSICIAN: Humberto Up MD   ________________________________     DATE: ______________    Please sign and return via fax to [phone].. Thank you, [facility].

## 2020-03-02 NOTE — DISCHARGE SUMMARY
Orthopedic Discharge Summary      Patient: Maricarmen Hayes      YOB: 1965    Medical Record Number: [id number]    Attending Physician: Dr Humberto Up  Consulting Physician(s):   Date of Admission: 2/25/2020  7:14 AM  Date of Discharge: 2/26/2020      Patient Active Problem List   Diagnosis   • Hyperlipidemia   • Well female exam with routine gynecological exam   • Elevated fasting blood sugar   • Elevated blood pressure reading in office without diagnosis of hypertension   • Acute pain of right shoulder   • Osteoarthritis of right shoulder   • Decreased libido   • S/P reverse total shoulder arthroplasty, right         Allergies: No Known Allergies    Current Medications:     Discharge Medications      New Medications      Instructions Start Date   aspirin 325 MG tablet  Notes to patient:  2/27/20   325 mg, Oral, Daily      oxyCODONE-acetaminophen  MG per tablet  Commonly known as:  PERCOCET  Notes to patient:  2/26/20 ANYTIME AFTER 7:00 PM   1-2 tablets, Oral, Every 4 Hours PRN         Continue These Medications      Instructions Start Date   MULTI-VITAMIN DAILY PO  Notes to patient:  2/27/20   Oral, Daily, HOLD PRIOR TO SURGERY      NON FORMULARY  Notes to patient:  TAKE AS PREVIOUSLY INSTRUCTED.   HOLD PRIOR TO SURGERY Testosterone/Versabase cream, 2%, using pea sized amount 2 to 3 times weekly.                 Past Medical History:   Diagnosis Date   • Abnormal Pap smear of cervix    • Anemia    • Fibrocystic breast    • Hyperlipidemia         Past Surgical History:   Procedure Laterality Date   • BILATERAL SALPINGO OOPHORECTOMY  05/21/2015    Dr. Reynolds   • BLEPHAROPLASTY Bilateral 4/4/2019    Procedure: BLEPHAROPLASTY;  Surgeon: Kye Guerrero MD;  Location: [facility] OR;  Service: Plastics   • BROW LIFT AND BLEPHAROPLASTY Bilateral 4/4/2019    Procedure: BROW LIFT AND  BILATERAL UPPER LID BLEPHAROPLASTY;  Surgeon: Kye Guerrero MD;  Location: [facility] OR;   Service: Plastics   •  SECTION     • COLONOSCOPY N/A 2016    Procedure: COLONOSCOPY;  Surgeon: Devon Campos MD;  Location: [facility] ENDOSCOPY;  Service:    • OOPHORECTOMY     • SUPRACERVICAL HYSTERECTOMY SALPINGO OOPHORECTOMY  2015    supracervical hyst/ [facility]    • TOTAL SHOULDER ARTHROPLASTY W/ DISTAL CLAVICLE EXCISION Right 2020    Procedure: RIGHT TOTAL SHOULDER REVERSE ARTHROPLASTY WITH DISTAL CLAVICLE EXCISION;  Surgeon: Humberto Up MD;  Location: [facility] OR OSC;  Service: Orthopedics;  Laterality: Right;   • TUBAL ABDOMINAL LIGATION          Social History     Occupational History   • Occupation: sales   Tobacco Use   • Smoking status: Never Smoker   • Smokeless tobacco: Never Used   Substance and Sexual Activity   • Alcohol use: Yes     Comment: MONTHLY 1-2 DRINKS   • Drug use: No   • Sexual activity: Yes     Partners: Male     Birth control/protection: Surgical     Comment: HYSTERECTOMY        Social History     Social History Narrative   • Not on file        Family History   Problem Relation Age of Onset   • Rheum arthritis Maternal Aunt    • Hyperlipidemia Maternal Aunt    • Ovarian cancer Maternal Aunt    • Alzheimer's disease Paternal Grandmother    • Hyperlipidemia Maternal Grandmother    • Hyperlipidemia Mother    • Ovarian cancer Mother    • Hyperlipidemia Maternal Aunt    • Ovarian cancer Maternal Aunt    • Malig Hyperthermia Neg Hx          Physical Exam: 55 y.o. female  Awake and alert. Afeb. vss  C/o pain- currently taking one percocet which is not helping much with the pain.  Sling in place. Dressing intact.  No numbness or tingling. Good pulses and capp refill.          Hospital Course:  55 y.o. female admitted to [facility] to services of No att. providers found with S/P reverse total shoulder arthroplasty, right [Z96.611] on 2020.  Antibiotic and VTE prophylaxis were per [facility]IP protocols. Post-operatively the patient transferred to  the post-operative floor where the patient underwent mobilization therapy that included active as well as passive ROM exercises. Opioids were titrated to achieve appropriate pain management to allow for participation in mobilization exercises. Vital signs are now stable. The incision is intact without signs or symptoms of infection. Operative extremity neurovascular status remains intact.   Appropriate education re: incision care, activity levels, medications, and follow up visits was completed and all questions were answered. The patient is now deemed stable for discharge to Home.      DIAGNOSTIC TESTS:   Labs reviewed      Discharge and Follow up Instructions:   Total Shoulder Joint Replacement Discharge Instructions:    I. ACTIVITIES:  1. Exercises:  ? Complete exercise program as taught by the hospital physical therapist 2 times per day  ? Wear sling when in bed and when out of bed (except when doing exercises)  ? Exercise program will be advanced by the physical therapist  ? During the day be up ambulating every 2 hours (while awake) for short distances  ? Complete the ankle pump exercises at least 10 times per hour (while awake)  ? Elevate operative arm when in bed and for at least 30 minutes during the day.   ? Use cold packs 20-30 minutes approximately 5 times per day. This should be done before and after completing your exercises and at any time you are experiencing pain/ stiffness in your operative extremity.      2. Activities of Daily Living:  ? No tub baths, hot tubs, or swimming pools for  4 weeks  ? May shower and let water run over the incision on post-operative day #5 if no drainage. Do not scrub or rub the incision. Simply let the water run over the incision and pat dry.    II. Restrictions  ? No pushing, pulling, lifting, or weight bearing on operative extremity.  ? Continue to follow shoulder precautions as instructed by hospital physical therapist  ? Your surgeon will discuss with you when you  will be able to drive again.  ? First week stay inside on even terrain. May go up and down stairs one stair at a time utilizing the hand rail  ? After one week, you may venture outside.    III. Precautions:  ? Everyone that comes near you should wash their hands  ? No elective dental, genital-urinary, or colon procedures or surgical procedures for 12 weeks after surgery unless absolutely necessary.  ?  If dental work or surgical procedure is deemed absolutely necessary, you will need to contact your surgeon as you will need to take antibiotics 1 hour prior to any dental work (including teeth cleanings).  ? Please discuss with your surgeon prophylactic antibiotics as the length of time this intervention will be necessary for you varies with each patient’s health history and situation.  ? Avoid sick people. If you must be around someone who is ill, they should wear a mask.  ? Avoid visits to the Emergency Room or Urgent Care unless you are having a life threatening event.     IV. INCISION CARE:  ? Wash your hands prior to dressing changes  ? Change the dressing as needed to keep incision clean and dry. Utilize dry gauze and paper tape. Avoid touching the side of the gauze that goes against the incision with your hands.  ? No creams or ointments to the incision  ? May remove dressing once the incision is free of drainage  ? Do not touch or pick at the incision  ? Check incision every day and notify surgeon immediately if any of the following signs or symptoms are noted:  o Increase in redness  o Increase in swelling around the incision and of the entire extremity  o Increase in pain  o Drainage oozing from the incision  o Pulling apart of the edges of the incision  o Increase in overall body temperature (greater than 100.5 degrees)  ? Your surgeon will instruct you regarding suture or staple removal    V. Medications:     1. Stool Softeners: You will be at greater risk of constipation after surgery due to being less  mobile and the pain medications.   ? Take stool softeners as instructed by your surgeon while on pain medications. Over the counter Colace 100 mg 1-2 capsules twice daily.   ? If stools become too loose or too frequent, please decreases the dosage or stop the stool softener.  ? If constipation occurs despite use of stool softeners, you are to continue the stool softeners and add a laxative (Milk of Magnesia 1 ounce daily as needed)  ? Drink plenty of fluids, and eat fruits and vegetables during your recovery time    2. Pain Medications utilized after surgery are narcotics and the law requires that the following information be given to all patients that are prescribed narcotics:  ? CLASSIFICATION: Pain medications are called Opioids and are narcotics  ? LEGALITIES: It is illegal to share narcotics with others and to drive within 24 hours of taking narcotics  ? POTENTIAL SIDE EFFECTS: Potential side effects of opioids include: nausea, vomiting, itching, dizziness, drowsiness, dry mouth, constipation, and difficulty urinating.  ?  POTENTIAL ADVERSE EFFECTS:   o Opioid tolerance can develop with use of pain medications and this simply means that it requires more and more of the medication to control pain; however, this is seen more in patients that use opioids for longer periods of time.  o Opioid dependence can develop with use of Opioids and this simply means that to stop the medication can cause withdrawal symptoms; however, this is seen with patients that use Opioids for longer periods of time.  o Opioid addiction can develop with use of Opioids and the incidence of this is very unlikely in patients who take the medications as ordered and stop the medications as instructed.  o Opioid overdose can be dangerous, but is unlikely when the medication is taken as ordered and stopped when ordered. It is important not to mix opioids with alcohol or with and type of sedative such as Benadryl as this can lead to over sedation  and respiratory difficulty.  ? DOSAGE:   o Pain medications will need to be taken consistently for the first week to decrease pain and promote adequate pain relief and participation in physical therapy.  o After the initial surgical pain begins to resolve, you may begin to decrease the pain medication. By the end of 6 weeks, you should be off of pain medications.  o Refills will not be given by the office during evening hours, on weekends, or after 6 weeks post-op.  o To seek refills on pain medications during the initial 6 week post-operative period, you must call the office 48 hours in advance to request the refill. The office will then notify you when to  the prescription. DO NOT wait until you are out of the medication to request a refill.    V. FOLLOW-UP VISITS:  ? You will need to follow up in the office with your surgeon in  10-14 days. Please call this number  to schedule this appointment.  ? You will need to  follow up with your primary care physician in 4 weeks.  ? If you have any concerns or suspected complications prior to your follow up visit, please call your surgeons office. Do not wait until your appointment time if you suspect complications. These will need to be addressed in the office promptly.  ?     2/26/20  ANGELICA Cooney

## 2020-03-03 ENCOUNTER — TREATMENT (OUTPATIENT)
Dept: PHYSICAL THERAPY | Facility: CLINIC | Age: 55
End: 2020-03-03

## 2020-03-03 DIAGNOSIS — M25.611 SHOULDER STIFFNESS, RIGHT: ICD-10-CM

## 2020-03-03 DIAGNOSIS — M25.511 ACUTE PAIN OF RIGHT SHOULDER: Primary | ICD-10-CM

## 2020-03-03 DIAGNOSIS — Z98.890 HISTORY OF REVERSE TOTAL REPLACEMENT OF RIGHT SHOULDER JOINT: ICD-10-CM

## 2020-03-03 PROCEDURE — 97110 THERAPEUTIC EXERCISES: CPT | Performed by: PHYSICAL THERAPIST

## 2020-03-03 PROCEDURE — 97530 THERAPEUTIC ACTIVITIES: CPT | Performed by: PHYSICAL THERAPIST

## 2020-03-03 PROCEDURE — 97140 MANUAL THERAPY 1/> REGIONS: CPT | Performed by: PHYSICAL THERAPIST

## 2020-03-03 NOTE — PROGRESS NOTES
Physical Therapy Daily Progress Note  2 treatments  Subjective     Maricarmen Hayes reports: patient is one week post op today. Notes that her pain is a 3-5/10 on this date. She has been consistent with her HEP and notes that she is getting more mobility out of her shoulder.         Objective   See Exercise, Manual, and Modality Logs for complete treatment.       Assessment/Plan  ROM is showing good improvement on this date. ROM was well tolerated.   Patient tolerated all treatment well and was able to tolerate progressions in therapeutic exercises with mild discomfort. Patient continues to need skilled therapy to improve ROM, pain, and shoulder function. Patient is progressing well at this time. Will continue to advance POC as tolerated. .     Progress per Plan of Care           Manual Therapy:    15     mins  71520;  Therapeutic Exercise:    12     mins  14147;     Neuromuscular Carol:        mins  47015;    Therapeutic Activity:     12     mins  44618;     Gait Training:           mins  51874;     Ultrasound:          mins  55189;    Electrical Stimulation:         mins  67777 ( );  Dry Needling          mins self-pay  12 min ice post treatment   Timed Treatment:   39   mins   Total Treatment:     51   mins    Tony Reynolds PT DPT  Physical Therapist  [place] License # [id number]

## 2020-03-05 ENCOUNTER — TREATMENT (OUTPATIENT)
Dept: PHYSICAL THERAPY | Facility: CLINIC | Age: 55
End: 2020-03-05

## 2020-03-05 DIAGNOSIS — M25.611 SHOULDER STIFFNESS, RIGHT: ICD-10-CM

## 2020-03-05 DIAGNOSIS — Z98.890 HISTORY OF REVERSE TOTAL REPLACEMENT OF RIGHT SHOULDER JOINT: ICD-10-CM

## 2020-03-05 DIAGNOSIS — M25.511 ACUTE PAIN OF RIGHT SHOULDER: Primary | ICD-10-CM

## 2020-03-05 PROCEDURE — 97140 MANUAL THERAPY 1/> REGIONS: CPT | Performed by: PHYSICAL THERAPIST

## 2020-03-05 PROCEDURE — 97530 THERAPEUTIC ACTIVITIES: CPT | Performed by: PHYSICAL THERAPIST

## 2020-03-05 PROCEDURE — 97110 THERAPEUTIC EXERCISES: CPT | Performed by: PHYSICAL THERAPIST

## 2020-03-05 NOTE — PROGRESS NOTES
Physical Therapy Daily Progress Note  Visit: 3    Subjective Maricarmen Hayes reports: compliance with HEP.     Objective   See Exercise, Manual, and Modality Logs for complete treatment.     Assessment/Plan: Good tolerance to ROM. Progressing well. Pt continues to need skilled therapy to improve ROM, pain and shoulder function.Plan details: Progress ROM / strengthening / stabilization / functional activity as tolerated     Manual Therapy:   15      mins  73653;  Therapeutic Exercise:    15      mins  95986;     Neuromuscular Carol:         mins  68601;    Therapeutic Activity:      8     mins  05673;     Gait Training:            mins  74520;     Ultrasound:           mins  77527;    Electrical Stimulation:          mins  01799 ( );  Dry Needling           mins self-pay  Traction           mins 38387  Canalith Repositioning         mins 45559      Timed Treatment:  38   mins   Total Treatment:    48    mins    Vidhya Humphries PT  [place] License #: [id number]    Physical Therapist

## 2020-03-06 ENCOUNTER — TREATMENT (OUTPATIENT)
Dept: PHYSICAL THERAPY | Facility: CLINIC | Age: 55
End: 2020-03-06

## 2020-03-06 DIAGNOSIS — M25.511 ACUTE PAIN OF RIGHT SHOULDER: Primary | ICD-10-CM

## 2020-03-06 DIAGNOSIS — M25.611 SHOULDER STIFFNESS, RIGHT: ICD-10-CM

## 2020-03-06 DIAGNOSIS — Z98.890 HISTORY OF REVERSE TOTAL REPLACEMENT OF RIGHT SHOULDER JOINT: ICD-10-CM

## 2020-03-06 PROCEDURE — 97140 MANUAL THERAPY 1/> REGIONS: CPT | Performed by: PHYSICAL THERAPIST

## 2020-03-06 PROCEDURE — 97530 THERAPEUTIC ACTIVITIES: CPT | Performed by: PHYSICAL THERAPIST

## 2020-03-06 PROCEDURE — 97110 THERAPEUTIC EXERCISES: CPT | Performed by: PHYSICAL THERAPIST

## 2020-03-09 ENCOUNTER — TREATMENT (OUTPATIENT)
Dept: PHYSICAL THERAPY | Facility: CLINIC | Age: 55
End: 2020-03-09

## 2020-03-09 DIAGNOSIS — Z98.890 HISTORY OF REVERSE TOTAL REPLACEMENT OF RIGHT SHOULDER JOINT: ICD-10-CM

## 2020-03-09 DIAGNOSIS — M25.511 ACUTE PAIN OF RIGHT SHOULDER: Primary | ICD-10-CM

## 2020-03-09 DIAGNOSIS — M25.611 SHOULDER STIFFNESS, RIGHT: ICD-10-CM

## 2020-03-09 PROCEDURE — 97140 MANUAL THERAPY 1/> REGIONS: CPT | Performed by: PHYSICAL THERAPIST

## 2020-03-09 PROCEDURE — 97110 THERAPEUTIC EXERCISES: CPT | Performed by: PHYSICAL THERAPIST

## 2020-03-09 NOTE — PROGRESS NOTES
Physical Therapy Daily Progress Note      Maricarmen Hayes reports: right shoulder seems to be doing well.    Subjective     Objective   See Exercise, Manual, and Modality Logs for complete treatment.   Exercise rationale/ pain free exercise performance  Anatomy and structure of affected musculature  Posture/Postural awareness  Ice application  Sleeping positions with pillows  Alternate exercise positions  Verbal/Tactile cues to ensure correct exercise performance/technique    Added: post sh rolls, pain free cerv rot and lat flex,       Assessment/Plan  Compliant/Cooperative with rehab efforts.  Subjectively reports no increased symptoms or discomfort with therapeutic exercise today.  Able to perform increased exercise/functional activity without increased right shoulder.  Benefits from verbal/tactile cues to ensure proper scapular positioning.      Progress per Plan of Care/Per MD Protocol           Manual Therapy:    15     mins  39330;  Therapeutic Exercise:     20    mins  13588;     Neuromuscular Carol:        mins  24474;    Therapeutic Activity:   12       mins  23313;     Gait Training:           mins  51595;     Ultrasound:          mins  52762;    Electrical Stimulation:         mins  43837 ( );      Timed Treatment:  47    mins   Total Treatment:   57     mins    Kenneth Grigsby, MILLY    Physical Therapist Assistant [place] [id number]

## 2020-03-09 NOTE — PROGRESS NOTES
Physical Therapy Daily Progress Note        Maricarmen Hayes reports: right shoulder did good Friday, but was a little sore on Saturday.  No new complaints otherwise reported.    Subjective     Objective   See Exercise, Manual, and Modality Logs for complete treatment.   Added:  TRX walkouts(flex and abd) x 15 each, swiss ball rollouts on table(flexion B UE) and scaption(R UE>    Assessment/Plan Positive response to manual intervention this session in regards to improved right shoulder mobility. Progressing well with exercises as allowed by protocol.      Other  See one more time prior to MD follow up on 3/11.  Check goals/ROM           Manual Therapy:   15      mins  18447;  Therapeutic Exercise:    26     mins  40796;     Neuromuscular Carol:        mins  22136;    Therapeutic Activity:      4    mins  91857;     Gait Training:           mins  64527;     Ultrasound:          mins  44050;    Electrical Stimulation:         mins  40868 ( );      Timed Treatment:  45    mins   Total Treatment:     55   mins    Kenneth Grigsby PTA    Physical Therapist Assistant [place] [id number]

## 2020-03-11 ENCOUNTER — TREATMENT (OUTPATIENT)
Dept: PHYSICAL THERAPY | Facility: CLINIC | Age: 55
End: 2020-03-11

## 2020-03-11 DIAGNOSIS — M25.611 SHOULDER STIFFNESS, RIGHT: ICD-10-CM

## 2020-03-11 DIAGNOSIS — M25.511 ACUTE PAIN OF RIGHT SHOULDER: Primary | ICD-10-CM

## 2020-03-11 DIAGNOSIS — Z98.890 HISTORY OF REVERSE TOTAL REPLACEMENT OF RIGHT SHOULDER JOINT: ICD-10-CM

## 2020-03-11 PROCEDURE — 97530 THERAPEUTIC ACTIVITIES: CPT | Performed by: PHYSICAL THERAPIST

## 2020-03-11 PROCEDURE — 97140 MANUAL THERAPY 1/> REGIONS: CPT | Performed by: PHYSICAL THERAPIST

## 2020-03-11 PROCEDURE — 97110 THERAPEUTIC EXERCISES: CPT | Performed by: PHYSICAL THERAPIST

## 2020-03-11 NOTE — PROGRESS NOTES
Physical Therapy Progress Note      3/11/2020  Humberto Up MD    Re: Maricarmen Hayes  ________________________________________________________________    Ms. Maricarmen Hayes, has attended 6/6 PT sessions for her right shoulder.  Treatment has consisted of:  Manual therapeutic interventions/techniques, AAROM exercises/performance as well as ice application and patient education.    S: Ms. Maricarmen Hayes states: right shoulder feels better since last MD visit/start of PT.  Notes that she has more mobility in her right shoulder with subsequent less soreness.  Able to do more with her arm in regards to ADL's(put pony tail in hair, put towel over rack/light).  Feels ~30% improved overall.    Subjective Evaluation    Pain  Current pain ratin  At best pain ratin  At worst pain ratin  Quality: dull ache and tight  Relieving factors: ice, medications, rest and change in position           Objective       Active Range of Motion     Right Shoulder   Flexion: 160 degrees   Abduction: 136 degrees   External rotation BTH: C4   Internal rotation BTB: Active internal rotation behind the back: outer hip pocket.     Additional Active Range of Motion Details  Supine ER(60 deg abd) 20 deg right shoulder  Supine IR(60 deg abd) 60 deg right shoulder  Seated flexion 152 deg right shoulder  Seated abd 112 deg right shoulder     See Exercise, Manual, and Modality Logs for complete treatment.   -posture/postural awareness  -painfree activity performance with avoidance of lifting items/objects until cleared by MD.  -continued ice application.    Assessment/Plan  Complaint/cooperative with rehab efforts to date.  Subjectively pt reports decreased right shoulder pain/discomfort with subsequent increase in shoulder mobility.  Objectively, she presents with improved A/AAROM right shoulder vs initial measurements with main limitations in IR/ER planes.  Able to progress exercise/activity for ROM of affected joint and sub max  isometrics without increased symptoms or discomfort.  Progressing well toward all goals and progressing well with protocol activities.    Feel pt would continue from further rehab efforts to assist with return to prior level of function.    Other  To MD with letter.  Await further orders regarding continued PT intervention           Manual Therapy: 15    mins  02543;  Therapeutic Exercise:   20      mins  56782;     Neuromuscular Carol:        mins  26292;    Therapeutic Activity:    12      mins  80731;     Gait Training:           mins  69272;     Ultrasound:          mins  64762;    Electrical Stimulation:         mins  05666 ( );      Timed Treatment:   47   mins   Total Treatment:   59     mins    Kenneth Grigsby PTA,  Physical Therapist Assistant # [id number]

## 2020-03-13 ENCOUNTER — TREATMENT (OUTPATIENT)
Dept: PHYSICAL THERAPY | Facility: CLINIC | Age: 55
End: 2020-03-13

## 2020-03-13 DIAGNOSIS — M25.511 ACUTE PAIN OF RIGHT SHOULDER: Primary | ICD-10-CM

## 2020-03-13 DIAGNOSIS — Z98.890 HISTORY OF REVERSE TOTAL REPLACEMENT OF RIGHT SHOULDER JOINT: ICD-10-CM

## 2020-03-13 DIAGNOSIS — M25.611 SHOULDER STIFFNESS, RIGHT: ICD-10-CM

## 2020-03-13 PROCEDURE — 97530 THERAPEUTIC ACTIVITIES: CPT | Performed by: PHYSICAL THERAPIST

## 2020-03-13 PROCEDURE — 97110 THERAPEUTIC EXERCISES: CPT | Performed by: PHYSICAL THERAPIST

## 2020-03-13 PROCEDURE — 97140 MANUAL THERAPY 1/> REGIONS: CPT | Performed by: PHYSICAL THERAPIST

## 2020-03-13 NOTE — PROGRESS NOTES
Physical Therapy Daily Progress Note  7 treatments  Subjective     Maricarmen Hayes reports: patient reports that she had her follow up with her surgeon and was given a good report. He released her from her sling and told that she has no restrictions at this time. Patient is feeling well on arrival.   Patient noted that she drove here and felt uncomfortable using her steering wheel.       Objective   See Exercise, Manual, and Modality Logs for complete treatment.       Assessment/Plan   PT beginning to progress functional AROM exercises activities.   Patient tolerated all treatment well and was able to tolerate progressions in therapeutic exercises with miminma discomfort. Patient continues to need skilled therapy to improve shoulder ROM, function, strength, and confidence with ADL's. Patient is progressing well at this time. Will continue to advance POC as tolerated.     Progress per Plan of Care           Manual Therapy:    12     mins  74844;  Therapeutic Exercise:    15     mins  04634;     Neuromuscular Carol:        mins  13363;    Therapeutic Activity:     16     mins  78915;     Gait Training:           mins  58618;     Ultrasound:          mins  59159;    Electrical Stimulation:         mins  38619 ( );  Dry Needling          mins self-pay    Timed Treatment:   43   mins   Total Treatment:     43   mins    Tony Reynolds, PT DPT  Physical Therapist  [place] License # [id number]

## 2020-03-17 ENCOUNTER — TREATMENT (OUTPATIENT)
Dept: PHYSICAL THERAPY | Facility: CLINIC | Age: 55
End: 2020-03-17

## 2020-03-17 DIAGNOSIS — M25.511 ACUTE PAIN OF RIGHT SHOULDER: Primary | ICD-10-CM

## 2020-03-17 DIAGNOSIS — M25.611 SHOULDER STIFFNESS, RIGHT: ICD-10-CM

## 2020-03-17 PROCEDURE — 97530 THERAPEUTIC ACTIVITIES: CPT | Performed by: PHYSICAL THERAPIST

## 2020-03-17 PROCEDURE — 97140 MANUAL THERAPY 1/> REGIONS: CPT | Performed by: PHYSICAL THERAPIST

## 2020-03-17 PROCEDURE — 97110 THERAPEUTIC EXERCISES: CPT | Performed by: PHYSICAL THERAPIST

## 2020-03-17 NOTE — PROGRESS NOTES
Physical Therapy Daily Progress Note  8 treatments  Subjective     Maricarmen Hayes reports: she is feeling well today on arrival. Notes that she has been taking care of her grandkids due to school closures and has not had as much time to get her exercises in.        Objective   See Exercise, Manual, and Modality Logs for complete treatment.   ROM continues to improve and AROM is becoming more comfortable.     Assessment/Plan     Patient tolerated all treatment well and was able to tolerate progressions in therapeutic exercises with minimal discomfort. Patient continues to need skilled therapy to improve shoulder ROM, strength, ADL/ Functional mobility tolerance. Patient is progressing well at this time. Will continue to advance POC as tolerated.     Progress per Plan of Care and Progress strengthening /stabilization /functional activity           Manual Therapy:    8     mins  89261;  Therapeutic Exercise:    17     mins  88028;     Neuromuscular Carol:        mins  23736;    Therapeutic Activity:     18     mins  63488;     Gait Training:           mins  80553;     Ultrasound:          mins  09645;    Electrical Stimulation:         mins  65898 ( );  Dry Needling          mins self-pay    Timed Treatment:   38   mins   Total Treatment:     38   mins    Tony Reynolds PT DPT  Physical Therapist  [place] License # [id number]

## 2020-03-20 ENCOUNTER — TREATMENT (OUTPATIENT)
Dept: PHYSICAL THERAPY | Facility: CLINIC | Age: 55
End: 2020-03-20

## 2020-03-20 DIAGNOSIS — M25.511 ACUTE PAIN OF RIGHT SHOULDER: Primary | ICD-10-CM

## 2020-03-20 DIAGNOSIS — Z98.890 HISTORY OF REVERSE TOTAL REPLACEMENT OF RIGHT SHOULDER JOINT: ICD-10-CM

## 2020-03-20 DIAGNOSIS — M25.611 SHOULDER STIFFNESS, RIGHT: ICD-10-CM

## 2020-03-20 PROCEDURE — 97530 THERAPEUTIC ACTIVITIES: CPT | Performed by: PHYSICAL THERAPIST

## 2020-03-20 PROCEDURE — 97140 MANUAL THERAPY 1/> REGIONS: CPT | Performed by: PHYSICAL THERAPIST

## 2020-03-20 PROCEDURE — 97110 THERAPEUTIC EXERCISES: CPT | Performed by: PHYSICAL THERAPIST

## 2020-03-20 NOTE — PROGRESS NOTES
Physical Therapy Re-cert/ Progress Note  9 treatments  Subjective     Maricarmen Hayes reports: she has continued to work on her HEP consistently and feels good about her progress. Patient has an average daily pain level of 4/10.  Notes that she will be comfortable taking a short term break given the recommendations for COVID-19 to avoid contact with the public. She will plan to start back on April 6th.       QUICK DASH score 40.91 (prior score 95.45)    Objective   See Exercise, Manual, and Modality Logs for complete treatment.   ROM in Degrees: (R) shoulder  155 AROM Flexion  35 AROM ER @ 45deg  150 AROM ABD    Strength: (R) shoulder   Flexion 4-/5  Extension 4/5  Abduction 4-/5      Assessment/Plan    GOALS  1. Be independent with initial HEP  2. Report pain </= 4/10 with all ADL's  3. Demonstrate improved right GH flexion PROM >/= 135 deg  4. Demonstrate improved right GH abduction PROM >/= 110 deg  5. Demonstrate improved right GH ER PROM >/= 30 deg    All met    6 weeks. Pt will:  1. Report pain of </= 3/10 with all ADLs  2. Demonstrate improved right UE MMT of >/= 4-/5 based on functional assessment of movements.  3. Demonstrate improved right GH AROM fexion of >/= 160 deg  4. Demonstrate improved right GH AROM Abduction of >/= 140 deg  5. QuickDASH </= 70%, showing a significant change    Functional Limitations: carrying objects, lifting, pulling, pushing, uncomfortable because of pain, reaching behind back, reaching overhead and unable to perform repetitive tasks    Progressing toward long term goals.     Patient was given specific instructions regarding progressions for HEP strengthening but will be taking at least 2 weeks off from formal PT due to COVID-19 restrictions. Patient will follow up with PT after that time to resume treatments.   Progress per Plan of Care and Progress strengthening /stabilization /functional activity           Manual Therapy:    12     mins  96903;  Therapeutic Exercise:    16      mins  17121;     Neuromuscular Carol:        mins  70473;    Therapeutic Activity:     14     mins  10539;     Gait Training:           mins  21084;     Ultrasound:          mins  74123;    Electrical Stimulation:         mins  72031 ( );  Dry Needling          mins self-pay    Timed Treatment:   42   mins   Total Treatment:     42   mins    Tony Reynolds PT DPT  Physical Therapist  [place] License # [id number]

## 2020-04-07 ENCOUNTER — TREATMENT (OUTPATIENT)
Dept: PHYSICAL THERAPY | Facility: CLINIC | Age: 55
End: 2020-04-07

## 2020-04-07 DIAGNOSIS — M25.611 SHOULDER STIFFNESS, RIGHT: ICD-10-CM

## 2020-04-07 DIAGNOSIS — M25.511 ACUTE PAIN OF RIGHT SHOULDER: Primary | ICD-10-CM

## 2020-04-07 DIAGNOSIS — Z98.890 HISTORY OF REVERSE TOTAL REPLACEMENT OF RIGHT SHOULDER JOINT: ICD-10-CM

## 2020-04-07 PROCEDURE — 97110 THERAPEUTIC EXERCISES: CPT | Performed by: PHYSICAL THERAPIST

## 2020-04-07 PROCEDURE — 97140 MANUAL THERAPY 1/> REGIONS: CPT | Performed by: PHYSICAL THERAPIST

## 2020-04-07 NOTE — PROGRESS NOTES
Physical Therapy Daily Progress Note  Visit: 10    Subjective Maricarmen Hayes reports: her shoulder has been doing good, still feels weak but overall  Better.     Objective   See Exercise, Manual, and Modality Logs for complete treatment.     Assessment/Plan: Compliant/cooperative with current rehab efforts.  Benefits from verbal/tactile cues to ensure correct exercise performance/technique, hold time and position. Plan details: Progress ROM / strengthening / stabilization / functional activity as tolerated     Manual Therapy:   15       mins  19712;  Therapeutic Exercise:     30     mins  81988;     Neuromuscular Carol:         mins  95772;    Therapeutic Activity:           mins  23639;     Gait Training:            mins  16015;     Ultrasound:           mins  13490;    Electrical Stimulation:          mins  26310 ( );  Dry Needling           mins self-pay  Traction           mins 41377  Canalith Repositioning         mins 72063      Timed Treatment:  45    mins   Total Treatment:  45     mins    Vidhya Humphries PT  [place] License #: [id number]    Physical Therapist

## 2020-04-10 ENCOUNTER — TREATMENT (OUTPATIENT)
Dept: PHYSICAL THERAPY | Facility: CLINIC | Age: 55
End: 2020-04-10

## 2020-04-10 DIAGNOSIS — Z98.890 HISTORY OF REVERSE TOTAL REPLACEMENT OF RIGHT SHOULDER JOINT: ICD-10-CM

## 2020-04-10 DIAGNOSIS — M25.611 SHOULDER STIFFNESS, RIGHT: ICD-10-CM

## 2020-04-10 DIAGNOSIS — M25.511 ACUTE PAIN OF RIGHT SHOULDER: Primary | ICD-10-CM

## 2020-04-10 PROCEDURE — 97140 MANUAL THERAPY 1/> REGIONS: CPT | Performed by: PHYSICAL THERAPIST

## 2020-04-10 PROCEDURE — 97110 THERAPEUTIC EXERCISES: CPT | Performed by: PHYSICAL THERAPIST

## 2020-04-10 NOTE — PROGRESS NOTES
Physical Therapy Daily Progress Note      Patient: Maricarmen Hayes   : 1965  Treatment Diagnosis:     ICD-10-CM ICD-9-CM   1. Acute pain of right shoulder M25.511 719.41   2. Shoulder stiffness, right M25.611 719.51   3. 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 Mostly notes a sense of weakness when using her arm during the day.    Objective   See Exercise, Manual, and Modality Logs for complete treatment.       Assessment/Plan  ROM is progressing appropriately with mild end range limitations.  She responded positively to manual therapy with improved AROM in right shoulder with less scapular compensations.  Her strength continues to be limited.  She was able to perform progressed exercises for right UE strengthening without adverse symptoms.  Will progress as tolerated.  Progress per Plan of Care           Timed:  Manual Therapy:    13     mins  70367;  Therapeutic Exercise:    26     mins  54898;     Neuromuscular Carol:        mins  45566;    Therapeutic Activity:          mins  14200;     Gait Training:           mins  72375;     Ultrasound:          mins  19859;    Iontophoresis:          mins  08903;  Dry Needling:          mins ;    Untimed:  Electrical Stimulation:         mins  58991 ( );  Mechanical Traction:         mins  91554;     Timed Treatment:   39   mins   Total Treatment:     49   mins    Letitia Pedersen PT  Physical Therapist

## 2020-04-14 ENCOUNTER — TREATMENT (OUTPATIENT)
Dept: PHYSICAL THERAPY | Facility: CLINIC | Age: 55
End: 2020-04-14

## 2020-04-14 DIAGNOSIS — Z98.890 HISTORY OF REVERSE TOTAL REPLACEMENT OF RIGHT SHOULDER JOINT: ICD-10-CM

## 2020-04-14 DIAGNOSIS — M25.611 SHOULDER STIFFNESS, RIGHT: ICD-10-CM

## 2020-04-14 DIAGNOSIS — M25.511 ACUTE PAIN OF RIGHT SHOULDER: Primary | ICD-10-CM

## 2020-04-14 PROCEDURE — 97140 MANUAL THERAPY 1/> REGIONS: CPT | Performed by: PHYSICAL THERAPIST

## 2020-04-14 PROCEDURE — 97110 THERAPEUTIC EXERCISES: CPT | Performed by: PHYSICAL THERAPIST

## 2020-04-14 PROCEDURE — 97530 THERAPEUTIC ACTIVITIES: CPT | Performed by: PHYSICAL THERAPIST

## 2020-04-14 NOTE — PROGRESS NOTES
Physical Therapy Daily Progress Note      Maricarmen Hayes reports: arm was a little sore after last treatment but no increased pain.    Subjective     Objective   See Exercise, Manual, and Modality Logs for complete treatment.   Added:  Supine serratus punches 2 lbs B UE's; supine alphabet 2 lbs R UE x 2, sidelying ER R 2 x 10    Assessment/Plan  Positive response to manual intervention this session in regards to improved ROM in IR/ER planes.  Able to progress strengthening/stabilization exercises/activities without increased symptoms, though benefits from verbal/tactile cues to ensure proper posture, scapular positioning as well as exercise technique and performance for affected musculature.       Progress per Plan of Care toward all goals.  Continues R UE manual interventions to improve ROM all planes as well as progress mm strength of affected musculature.           Manual Therapy:   15      mins  29594;  Therapeutic Exercise:    25     mins  01033;     Neuromuscular Carol:        mins  44896;    Therapeutic Activity:     15     mins  52441;     Gait Training:           mins  07701;     Ultrasound:          mins  34537;    Electrical Stimulation:         mins  75961 ( );      Timed Treatment:  55    mins   Total Treatment:    65    mins    Kenneth Grigsby PTA    Physical Therapist Assistant [place] [id number]

## 2020-04-16 ENCOUNTER — TREATMENT (OUTPATIENT)
Dept: PHYSICAL THERAPY | Facility: CLINIC | Age: 55
End: 2020-04-16

## 2020-04-16 DIAGNOSIS — M25.511 ACUTE PAIN OF RIGHT SHOULDER: ICD-10-CM

## 2020-04-16 DIAGNOSIS — Z98.890 HISTORY OF REVERSE TOTAL REPLACEMENT OF RIGHT SHOULDER JOINT: Primary | ICD-10-CM

## 2020-04-16 DIAGNOSIS — M25.611 SHOULDER STIFFNESS, RIGHT: ICD-10-CM

## 2020-04-16 PROCEDURE — 97530 THERAPEUTIC ACTIVITIES: CPT | Performed by: PHYSICAL THERAPIST

## 2020-04-16 PROCEDURE — 97110 THERAPEUTIC EXERCISES: CPT | Performed by: PHYSICAL THERAPIST

## 2020-04-16 NOTE — PROGRESS NOTES
Physical Therapy Daily Progress Note        Maricarmen Freddy reports: a little tender in/along right shoulder last night.  Took aleve to sleep, felt better today.    Subjective     Objective   See Exercise, Manual, and Modality Logs for complete treatment.   -benefits from verbal/tactile cues to ensure proper scapula utilization/positioning.  - HEP compliance when away from PT.    Added:  Prone T and Y - AAROM R UE    * pt with time constraints today due to having to RTW post therapy session *      Assessment/Plan  Subjectively, reports mild increased right UE soreness from stretches previous session, but feel better today.  Able to progress reps/weight with isolated mm activities of RTC but evident scapular weakness of rhomboids and mid/low traps with stabilization activities.  Will benefit from continued/increased emphasis on scap stab activities/exercises to improve strength.    Progress strengthening /stabilization /functional activity for affected musculature.  Continue progression toward all goals!           Manual Therapy:         mins  64420;  Therapeutic Exercise:     32    mins  28719;     Neuromuscular Carol:        mins  22069;    Therapeutic Activity:   10       mins  26707;     Gait Training:           mins  89136;     Ultrasound:          mins  06165;    Electrical Stimulation:         mins  43912 ( );      Timed Treatment: 42     mins   Total Treatment:   42     mins    Kenneth Grigsby PTA    Physical Therapist Assistant [place] [id number]

## 2020-04-21 ENCOUNTER — TREATMENT (OUTPATIENT)
Dept: PHYSICAL THERAPY | Facility: CLINIC | Age: 55
End: 2020-04-21

## 2020-04-21 DIAGNOSIS — M25.511 ACUTE PAIN OF RIGHT SHOULDER: ICD-10-CM

## 2020-04-21 DIAGNOSIS — Z98.890 HISTORY OF REVERSE TOTAL REPLACEMENT OF RIGHT SHOULDER JOINT: Primary | ICD-10-CM

## 2020-04-21 DIAGNOSIS — M25.611 SHOULDER STIFFNESS, RIGHT: ICD-10-CM

## 2020-04-21 PROCEDURE — 97110 THERAPEUTIC EXERCISES: CPT | Performed by: PHYSICAL THERAPIST

## 2020-04-21 NOTE — PROGRESS NOTES
Physical Therapy Daily Progress Note  Visit: 14    Subjective Maricarmen Hayes reports: her shoulder has been doing good     Objective   See Exercise, Manual, and Modality Logs for complete treatment.     Assessment/Plan:  Compliant/cooperative with current rehab efforts.  Benefits from verbal/tactile cues to ensure correct exercise performance/technique, hold time and position. Plan details: Progress ROM / strengthening / stabilization / functional activity as tolerated     Manual Therapy:          mins  64534;  Therapeutic Exercise:    45      mins  60352;     Neuromuscular Carol:         mins  55234;    Therapeutic Activity:           mins  97360;     Gait Training:            mins  66894;     Ultrasound:           mins  14662;    Electrical Stimulation:          mins  84119 ( );  Dry Needling           mins self-pay  Traction           mins 95300  Canalith Repositioning         mins 68986      Timed Treatment:   45   mins   Total Treatment:    45    mins    GULSHAN Mata License #: [id number]    Physical Therapist

## 2020-04-28 ENCOUNTER — TREATMENT (OUTPATIENT)
Dept: PHYSICAL THERAPY | Facility: CLINIC | Age: 55
End: 2020-04-28

## 2020-04-28 DIAGNOSIS — M25.611 SHOULDER STIFFNESS, RIGHT: ICD-10-CM

## 2020-04-28 DIAGNOSIS — Z98.890 HISTORY OF REVERSE TOTAL REPLACEMENT OF RIGHT SHOULDER JOINT: Primary | ICD-10-CM

## 2020-04-28 DIAGNOSIS — M25.511 ACUTE PAIN OF RIGHT SHOULDER: ICD-10-CM

## 2020-04-28 PROCEDURE — 97112 NEUROMUSCULAR REEDUCATION: CPT | Performed by: PHYSICAL THERAPIST

## 2020-04-28 PROCEDURE — 97140 MANUAL THERAPY 1/> REGIONS: CPT | Performed by: PHYSICAL THERAPIST

## 2020-04-28 PROCEDURE — 97110 THERAPEUTIC EXERCISES: CPT | Performed by: PHYSICAL THERAPIST

## 2020-04-28 NOTE — PROGRESS NOTES
Physical Therapy Daily Progress Note    Maricarmen Hayes reports: right shoulder is allowing her to do more at home/outside.  Able to start  now and do some digging with a shovel.  Still can tell that strength is lacking in certain positions/movements. Feels ~ 80% improved overall.      Subjective     Objective   See Exercise, Manual, and Modality Logs for complete treatment.   Added:  Wall push ups/ ball push ups on wall; unilateral alphabets with ball on wall.  Issued yellow t band for home exercises due to limited hand weight selection.  Instructed in band use for standing 3 way, bent/prone rows, bicep curls, etc.  Verbal/tactile cues for proper scapular positioning with resistive band activities.    Assessment/Plan  Continued improvement via subjective reports right shoulder in regards to ability to perform increased ADL/functional activity before fatiguing.  Exhibits good passive ROM right shoulder all planes, though some restriction/limitation noted in IR/ER planes.  Able to progress scap stabilization exercises without increased symptoms or discomfort only early fatigue.    Other  Re cert next visit.  Update Quick Dash, check/update goals.             Manual Therapy:     16    mins  18053;  Therapeutic Exercise:    20     mins  22008;     Neuromuscular Carol:    8    mins  45415;    Therapeutic Activity:    4      mins  10063;     Gait Training:           mins  17805;     Ultrasound:          mins  17183;    Electrical Stimulation:         mins  87667 ( );      Timed Treatment:   50   mins   Total Treatment:     50   mins    Kenneth Grigsby PTA    Physical Therapist Assistant [place] [id number]

## 2020-04-30 ENCOUNTER — TREATMENT (OUTPATIENT)
Dept: PHYSICAL THERAPY | Facility: CLINIC | Age: 55
End: 2020-04-30

## 2020-04-30 DIAGNOSIS — Z98.890 HISTORY OF REVERSE TOTAL REPLACEMENT OF RIGHT SHOULDER JOINT: Primary | ICD-10-CM

## 2020-04-30 DIAGNOSIS — M25.611 SHOULDER STIFFNESS, RIGHT: ICD-10-CM

## 2020-04-30 DIAGNOSIS — M25.511 ACUTE PAIN OF RIGHT SHOULDER: ICD-10-CM

## 2020-04-30 PROCEDURE — 97110 THERAPEUTIC EXERCISES: CPT | Performed by: PHYSICAL THERAPIST

## 2020-04-30 PROCEDURE — 97140 MANUAL THERAPY 1/> REGIONS: CPT | Performed by: PHYSICAL THERAPIST

## 2020-04-30 NOTE — PROGRESS NOTES
Physical Therapy Re-Assessment / Re-Certification        Patient: Maricarmen Hayes   : 1965  Visit Diagnoses:     ICD-10-CM ICD-9-CM   1. 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 Shoulder stiffness, right M25.611 719.51   3. Acute pain of right shoulder M25.511 719.41     Referring practitioner: Anton Dwyer MD  Date of Initial Visit: Type: THERAPY  Noted: 2020  Today's Date: 2020  Patient seen for 16 sessions      Subjective:   Maricarmen Hayes reports:   Subjective Questionnaire: QuickDASH: 13.64%  Clinical Progress: improved  Home Program Compliance: Yes  Treatment has included: therapeutic exercise, manual therapy and cryotherapy    Subjective Evaluation    Pain  At best pain ratin  At worst pain rating: 3       Patient reports her shoulder is feeling a lot better.  Still feels weak with certain tasks.  Had some soreness after exercise and occasionally at night.      Objective       Active Range of Motion     Right Shoulder   Flexion: 155 degrees   Extension: 30 degrees   Abduction: 155 degrees   External rotation 90°: 68 degrees  Internal rotation 90°: 43 degrees     Strength/Myotome Testing     Right Shoulder     Planes of Motion   Flexion: 4-   Extension: 4+   Abduction: 3+   Adduction: 4+   External rotation at 0°: 3+   Internal rotation at 0°: 4-     Isolated Muscles   Biceps: 4   Lower trapezius: 3   Middle deltoid: 3   Triceps: 4+            Assessment/Plan  Patient presents with improved shoulder ROM and strength.  Functional deficits persist.  Her DASH score has improved from 95.45% to 13.64%.  She is progressing appropriately and will benefit from continued PT.  Will continue direction of treatment to advance toward goals.  Progress toward previous goals: Partially Met    Goal Review    Short Term (2-3 wks):  1. Patient to have increased right shoulder AROM to WNLs to restore functional joint mobility .  2. Patient will have increased right  shoulder strength to 4+/5 to allow for increased joint stability and to decrease joint/soft tissue stresses.  3. Patient will have increased bilateral scapular strength to 4/5 to allow for increased scapular stabilization and to decrease joint/soft tissue stresses.  4. Patient will have increased GHJ mobility to WNLs to allow for restoration of normal joint mechanics and decrease joint/soft tissue stresses.    Long Term Goal (4-6 wks):  1.  Patient will reports decreased shoulder pain to 0/10 to allow for restorative sleep and return to PLOF/Work/Sport/Leisure activities.  2.  Patient will have improved DASH score of 10% or less.  3.  Patient will be independent in performance of HEP for carryover upon discharge from skilled PT services.          Recommendations: Continue as planned  Timeframe: 1 month  Prognosis to achieve goals: good    PT Signature: Letitia Pedersen, PT      Based upon review of the patient's progress and continued therapy plan, it is my medical opinion that Maricarmen Hayes should continue physical therapy treatment at [facility]  [address] [place] [id number] [phone].    Signature: __________________________________  Anton Dwyer MD    Timed:  Manual Therapy:    13     mins  69609;  Therapeutic Exercise:    42     mins  30135;     Neuromuscular Carol:        mins  49301;    Therapeutic Activity:          mins  00293;     Gait Training:           mins  57223;     Ultrasound:          mins  55167;  Iontophoresis:          mins  50065;    Dry Needling:          mins ;    Untimed:  Electrical Stimulation:         mins  93947 ( );  Mechanical Traction:         mins  82186;     Timed Treatment:   55   mins   Total Treatment:     55   mins

## 2020-05-05 ENCOUNTER — TREATMENT (OUTPATIENT)
Dept: PHYSICAL THERAPY | Facility: CLINIC | Age: 55
End: 2020-05-05

## 2020-05-05 DIAGNOSIS — M25.511 ACUTE PAIN OF RIGHT SHOULDER: ICD-10-CM

## 2020-05-05 DIAGNOSIS — Z98.890 HISTORY OF REVERSE TOTAL REPLACEMENT OF RIGHT SHOULDER JOINT: Primary | ICD-10-CM

## 2020-05-05 DIAGNOSIS — M25.611 SHOULDER STIFFNESS, RIGHT: ICD-10-CM

## 2020-05-05 PROCEDURE — 97140 MANUAL THERAPY 1/> REGIONS: CPT | Performed by: PHYSICAL THERAPIST

## 2020-05-05 PROCEDURE — 97110 THERAPEUTIC EXERCISES: CPT | Performed by: PHYSICAL THERAPIST

## 2020-05-05 NOTE — PROGRESS NOTES
Physical Therapy Daily Progress Note      Patient: Maricarmen Hayes   : 1965  Treatment Diagnosis:     ICD-10-CM ICD-9-CM   1. 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 Shoulder stiffness, right M25.611 719.51   3. Acute pain of right shoulder M25.511 719.41     Referring practitioner: Anton Dwyer MD  Date of Initial Visit: Type: THERAPY  Noted: 2020  Today's Date: 2020  Patient seen for 17 sessions         Maricarmen Hayes reports:     Subjective   Patient reports her shoulder is feeling good, no new complaints.    Objective   See Exercise, Manual, and Modality Logs for complete treatment.       Assessment/Plan  Patient performed exercises to tolerance with progressed parameters and addition of strengthening exercises.  Her ROM is progressing and is near full ROM.    Progress per Plan of Care and Progress strengthening /stabilization /functional activity           Timed:  Manual Therapy:    10     mins  63544;  Therapeutic Exercise:    33     mins  54900;     Neuromuscular Carol:        mins  52234;    Therapeutic Activity:          mins  44637;     Gait Training:           mins  58546;     Ultrasound:          mins  20238;    Iontophoresis:          mins  41449;  Dry Needling:          mins ;    Untimed:  Electrical Stimulation:         mins  78257 ( );  Mechanical Traction:         mins  05317;     Timed Treatment:   43   mins   Total Treatment:     55   mins    Letitia Pedersen PT  Physical Therapist

## 2020-05-07 ENCOUNTER — TREATMENT (OUTPATIENT)
Dept: PHYSICAL THERAPY | Facility: CLINIC | Age: 55
End: 2020-05-07

## 2020-05-07 DIAGNOSIS — M25.611 SHOULDER STIFFNESS, RIGHT: ICD-10-CM

## 2020-05-07 DIAGNOSIS — M25.511 ACUTE PAIN OF RIGHT SHOULDER: ICD-10-CM

## 2020-05-07 DIAGNOSIS — Z98.890 HISTORY OF REVERSE TOTAL REPLACEMENT OF RIGHT SHOULDER JOINT: Primary | ICD-10-CM

## 2020-05-07 PROCEDURE — 97110 THERAPEUTIC EXERCISES: CPT | Performed by: PHYSICAL THERAPIST

## 2020-05-07 PROCEDURE — 97140 MANUAL THERAPY 1/> REGIONS: CPT | Performed by: PHYSICAL THERAPIST

## 2020-05-07 NOTE — PROGRESS NOTES
Physical Therapy Daily Progress Note      Patient: Maricarmen Hayes   : 1965  Treatment Diagnosis:     ICD-10-CM ICD-9-CM   1. 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 Shoulder stiffness, right M25.611 719.51   3. Acute pain of right shoulder M25.511 719.41     Referring practitioner: Anton Dwyer MD  Date of Initial Visit: Type: THERAPY  Noted: 2020  Today's Date: 2020  Patient seen for 18 sessions         Maricarmen Hayes reports:     Subjective   Patient reports her shoulder has not been bothering her much.  States she is able to use her arm more naturally.    Objective   See Exercise, Manual, and Modality Logs for complete treatment.       Assessment/Plan  Patient performed exercises to tolerance with progressed parameters.  Her strength in progressing as well as her ROM.  She continues to have mild end range and axillary tightness.  She is responding positively to manual therapy.  Will continue to progress as tolerated.  Progress per Plan of Care and Progress strengthening /stabilization /functional activity           Timed:  Manual Therapy:    9     mins  31415;  Therapeutic Exercise:    33     mins  55356;     Neuromuscular Carol:        mins  48833;    Therapeutic Activity:          mins  78785;     Gait Training:           mins  71984;     Ultrasound:          mins  27263;    Iontophoresis:          mins  70025;  Dry Needling:          mins ;    Untimed:  Electrical Stimulation:         mins  86085 ( );  Mechanical Traction:         mins  27152;     Timed Treatment:   42   mins   Total Treatment:     55   mins    Letitia Pedersen, PT  Physical Therapist

## 2020-05-12 ENCOUNTER — TREATMENT (OUTPATIENT)
Dept: PHYSICAL THERAPY | Facility: CLINIC | Age: 55
End: 2020-05-12

## 2020-05-12 DIAGNOSIS — M25.511 ACUTE PAIN OF RIGHT SHOULDER: ICD-10-CM

## 2020-05-12 DIAGNOSIS — M25.611 SHOULDER STIFFNESS, RIGHT: ICD-10-CM

## 2020-05-12 DIAGNOSIS — Z98.890 HISTORY OF REVERSE TOTAL REPLACEMENT OF RIGHT SHOULDER JOINT: Primary | ICD-10-CM

## 2020-05-12 PROCEDURE — 97110 THERAPEUTIC EXERCISES: CPT | Performed by: PHYSICAL THERAPIST

## 2020-05-12 PROCEDURE — 97530 THERAPEUTIC ACTIVITIES: CPT | Performed by: PHYSICAL THERAPIST

## 2020-05-12 PROCEDURE — 97140 MANUAL THERAPY 1/> REGIONS: CPT | Performed by: PHYSICAL THERAPIST

## 2020-05-12 NOTE — PROGRESS NOTES
Physical Therapy Daily Progress Note  19 treatments  Subjective     Maricarmen Hayes reports: she is feeling well on this date. Notes that her shoulder was sore after working in her yard over the weekend. No other new complaints.         Objective   See Exercise, Manual, and Modality Logs for complete treatment.       Assessment/Plan     Patient tolerated all treatment well and was able to tolerate progressions in therapeutic exercises with minimal discomfort. Patient continues to need skilled therapy to improve shoulder strength and functional activity tolerance. Patient is progressing well at this time. Will continue to advance POC as tolerated.     Progress per Plan of Care and Progress strengthening /stabilization /functional activity           Manual Therapy:    8     mins  91030;  Therapeutic Exercise:    25     mins  50839;     Neuromuscular Caorl:        mins  48912;    Therapeutic Activity:     12     mins  76584;     Gait Training:           mins  25488;     Ultrasound:          mins  27946;    Electrical Stimulation:         mins  40396 ( );  Dry Needling          mins self-pay  10 min ice post treatment    Timed Treatment:   45   mins   Total Treatment:     55   mins    Tony Reynolds PT DPT  Physical Therapist  [place] License # [id number]

## 2020-05-14 ENCOUNTER — TREATMENT (OUTPATIENT)
Dept: PHYSICAL THERAPY | Facility: CLINIC | Age: 55
End: 2020-05-14

## 2020-05-14 DIAGNOSIS — Z98.890 HISTORY OF REVERSE TOTAL REPLACEMENT OF RIGHT SHOULDER JOINT: Primary | ICD-10-CM

## 2020-05-14 DIAGNOSIS — M25.611 SHOULDER STIFFNESS, RIGHT: ICD-10-CM

## 2020-05-14 DIAGNOSIS — M25.511 ACUTE PAIN OF RIGHT SHOULDER: ICD-10-CM

## 2020-05-14 PROCEDURE — 97110 THERAPEUTIC EXERCISES: CPT | Performed by: PHYSICAL THERAPIST

## 2020-05-14 PROCEDURE — 97530 THERAPEUTIC ACTIVITIES: CPT | Performed by: PHYSICAL THERAPIST

## 2020-05-14 NOTE — PROGRESS NOTES
Physical Therapy Daily Progress Note  20 treatments  Subjective     Maricarmen Hayes reports: she was able to help her daughter move boxes and push mow her lawn with only mild soreness in her R shoulder. Notes that she is feeling well on arrival.         Objective   See Exercise, Manual, and Modality Logs for complete treatment.       Assessment/Plan     Patient tolerated all treatment well and was able to tolerate progressions in therapeutic exercises with mild discomfort. Patient continues to need skilled therapy to improve shoulder strength and function. She has 2 visits remaining during which we will solidify long term strength and mobility program. Patient is progressing well at this time. Will continue to advance POC as tolerated.   Plan to DC after 2 visits.   Progress per Plan of Care and Progress strengthening /stabilization /functional activity           Manual Therapy:    5     mins  13354;  Therapeutic Exercise:    24     mins  53849;     Neuromuscular Carol:        mins  60652;    Therapeutic Activity:     10     mins  42586;     Gait Training:           mins  78934;     Ultrasound:          mins  73143;    Electrical Stimulation:         mins  29689 ( );  Dry Needling          mins self-pay  10 min ice  Timed Treatment:   39   mins   Total Treatment:     49   mins    Tony Reynolds PT DPT  Physical Therapist  [place] License # [id number]

## 2020-05-14 NOTE — PATIENT INSTRUCTIONS
Access Code: [email or web address]   URL: [email or web address]   Date: 05/14/2020   Prepared by: Tony Reynolds     Exercises   Kneeling Push Up with Swiss Ball - 10 reps - 3 sets - 1x daily - 7x weekly   Standing Bilateral Low Shoulder Row with Anchored Resistance - 10 reps - 3 sets - 1x daily - 7x weekly   Hip Hinge Rock Back - 10 reps - 3 sets - 1x daily - 7x weekly   Standing Tricep Extensions with Resistance - 10 reps - 3 sets - 1x daily - 7x weekly   Standing Bicep Curls with Resistance - 10 reps - 3 sets - 1x daily - 7x weekly   Standing Shoulder Horizontal Abduction with Resistance - 10 reps - 3 sets - 1x daily - 7x weekly

## 2020-05-18 ENCOUNTER — TELEPHONE (OUTPATIENT)
Dept: OBSTETRICS AND GYNECOLOGY | Age: 55
End: 2020-05-18

## 2020-05-18 NOTE — TELEPHONE ENCOUNTER
Angelika Poe from [facility] is calling because she states they sent over a refill request on this patients testosterone. The last time it was filled was 12/27/2020. Can we send this in?    [phone]

## 2020-06-10 ENCOUNTER — TREATMENT (OUTPATIENT)
Dept: PHYSICAL THERAPY | Facility: CLINIC | Age: 55
End: 2020-06-10

## 2020-06-10 DIAGNOSIS — Z98.890 HISTORY OF REVERSE TOTAL REPLACEMENT OF RIGHT SHOULDER JOINT: Primary | ICD-10-CM

## 2020-06-10 DIAGNOSIS — M25.611 SHOULDER STIFFNESS, RIGHT: ICD-10-CM

## 2020-06-10 PROCEDURE — 97140 MANUAL THERAPY 1/> REGIONS: CPT | Performed by: PHYSICAL THERAPIST

## 2020-06-10 PROCEDURE — 97110 THERAPEUTIC EXERCISES: CPT | Performed by: PHYSICAL THERAPIST

## 2020-06-16 ENCOUNTER — TREATMENT (OUTPATIENT)
Dept: PHYSICAL THERAPY | Facility: CLINIC | Age: 55
End: 2020-06-16

## 2020-06-16 DIAGNOSIS — M25.611 SHOULDER STIFFNESS, RIGHT: ICD-10-CM

## 2020-06-16 DIAGNOSIS — Z98.890 HISTORY OF REVERSE TOTAL REPLACEMENT OF RIGHT SHOULDER JOINT: Primary | ICD-10-CM

## 2020-06-16 DIAGNOSIS — M25.511 ACUTE PAIN OF RIGHT SHOULDER: ICD-10-CM

## 2020-06-16 PROCEDURE — 97530 THERAPEUTIC ACTIVITIES: CPT | Performed by: PHYSICAL THERAPIST

## 2020-06-16 PROCEDURE — 97110 THERAPEUTIC EXERCISES: CPT | Performed by: PHYSICAL THERAPIST

## 2020-06-16 PROCEDURE — 97140 MANUAL THERAPY 1/> REGIONS: CPT | Performed by: PHYSICAL THERAPIST

## 2020-06-16 NOTE — PROGRESS NOTES
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 She was told that everything is healing well and to continue with PT. She had time off due to restrictions from Covid-19. Patient would like to work on mobility, strength, and long term HEP over her coming visits. Patient notes that certain aspects of dressing are still difficult.     Subjective Questionnaire: QuickDASH: 11.3  12-19 1-19% CI       Clinical Progress: improved  Home Program Compliance: Yes  Treatment has included: therapeutic exercise, neuromuscular re-education, manual therapy and therapeutic activity    Subjective   Objective       Active Range of Motion     Right Shoulder   Flexion: 155 degrees   Extension: 35 degrees   Abduction: 150 degrees   External rotation 90°: 65 degrees  Internal rotation 90°: 45 degrees   Functional IR to mid lumbar spine.      Strength/Myotome Testing     Right Shoulder      Planes of Motion   Flexion: 4  Extension: 4- with pain  Abduction: 3+   Adduction: 4+   External rotation at 0°: 4  Internal rotation at 0°: 4- with pain     Isolated Muscles   Biceps: 4   Triceps: 4                Assessment/Plan   Patient has regressed slightly since her last visit in PT. Her strength and ROM are nearly where she was at her last re-cert from 20. She needs to resume formal therapy at this time to improve mobility, strength, and shoulder function for ADLs and recreation. Patient will benefit from 4 weeks at 1-2x per week to work on long term HEP and functional deficits noted above.     Progress toward previous goals: Partially Met      Goal Review     Short Term (2-3 wks):  1. Patient to have increased right  shoulder AROM to WNLs to restore functional joint mobility .  2. Patient will have increased right shoulder strength to 4+/5 to allow for increased joint stability and to decrease joint/soft tissue stresses.  3. Patient will have increased bilateral scapular strength to 4/5 to allow for increased scapular stabilization and to decrease joint/soft tissue stresses.  4. Patient will have increased GHJ mobility to WNLs to allow for restoration of normal joint mechanics and decrease joint/soft tissue stresses.  Progressing     Long Term Goal (4-6 wks):  1.  Patient will reports decreased shoulder pain to >/=1/10 to allow for restorative sleep and return to PLOF/Work/Sport/Leisure activities.  2.  Patient will have improved DASH score of 10% or less.  3.  Patient will be independent in performance of HEP for carryover upon discharge from skilled PT services.     Progressing toward prior goals; resume goals for POC with 4 week timeline.       Recommendations: Continue as planned  Timeframe: 1 month  Prognosis to achieve goals: good    PT Signature: Tony Reynolds, PT DPT  [place] License # [id number]      Based upon review of the patient's progress and continued therapy plan, it is my medical opinion that Maricarmen Hayes should continue physical therapy treatment at [facility]  [address] [place] [id number] [phone].    Signature: __________________________________  Anton Dwyer MD    Manual Therapy:    8     mins  17483;  Therapeutic Exercise:    23     mins  98103;     Neuromuscular Carol:        mins  14280;    Therapeutic Activity:     12     mins  65306;     Gait Training:           mins  81816;     Ultrasound:          mins  35636;    Electrical Stimulation:         mins  28485 ( );  Dry Needling          mins self-pay    Timed Treatment:   43   mins   Total Treatment:     43   mins

## 2020-06-18 ENCOUNTER — TREATMENT (OUTPATIENT)
Dept: PHYSICAL THERAPY | Facility: CLINIC | Age: 55
End: 2020-06-18

## 2020-06-18 DIAGNOSIS — Z98.890 HISTORY OF REVERSE TOTAL REPLACEMENT OF RIGHT SHOULDER JOINT: Primary | ICD-10-CM

## 2020-06-18 DIAGNOSIS — M25.511 ACUTE PAIN OF RIGHT SHOULDER: ICD-10-CM

## 2020-06-18 DIAGNOSIS — M25.611 SHOULDER STIFFNESS, RIGHT: ICD-10-CM

## 2020-06-18 PROCEDURE — 97530 THERAPEUTIC ACTIVITIES: CPT | Performed by: PHYSICAL THERAPIST

## 2020-06-18 PROCEDURE — 97110 THERAPEUTIC EXERCISES: CPT | Performed by: PHYSICAL THERAPIST

## 2020-06-18 NOTE — PROGRESS NOTES
Physical Therapy Daily Progress Note  23 treatments  Subjective     Maricarmen Hayes reports: she was a little sore after our last treatment. Feeling well on arrival. Muscular soreness mild.         Objective   See Exercise, Manual, and Modality Logs for complete treatment.       Assessment/Plan  Patient tolerated all treatment and had minimal pain during any TE/ TA. Educated patient on reproducing motion patterns that we are strengthening in therapy while at home. Patient will be progressing toward exercises that do not require machines to ensure that long term HEP is in place.   Progress per Plan of Care and Progress strengthening /stabilization /functional activity           Manual Therapy:         mins  26507;  Therapeutic Exercise:   24      mins  83316;     Neuromuscular Carol:        mins  81107;    Therapeutic Activity:     14     mins  42816;     Gait Training:           mins  99645;     Ultrasound:          mins  52631;    Electrical Stimulation:         mins  45048 ( );  Dry Needling          mins self-pay    Timed Treatment:   38   mins   Total Treatment:     38   mins    Tony Reynolds PT DPT  Physical Therapist  [place] License # [id number]

## 2020-06-23 ENCOUNTER — TREATMENT (OUTPATIENT)
Dept: PHYSICAL THERAPY | Facility: CLINIC | Age: 55
End: 2020-06-23

## 2020-06-23 DIAGNOSIS — M25.611 SHOULDER STIFFNESS, RIGHT: ICD-10-CM

## 2020-06-23 DIAGNOSIS — M25.511 ACUTE PAIN OF RIGHT SHOULDER: ICD-10-CM

## 2020-06-23 DIAGNOSIS — Z98.890 HISTORY OF REVERSE TOTAL REPLACEMENT OF RIGHT SHOULDER JOINT: Primary | ICD-10-CM

## 2020-06-23 PROCEDURE — 97110 THERAPEUTIC EXERCISES: CPT | Performed by: PHYSICAL THERAPIST

## 2020-06-23 PROCEDURE — 97530 THERAPEUTIC ACTIVITIES: CPT | Performed by: PHYSICAL THERAPIST

## 2020-06-30 ENCOUNTER — TREATMENT (OUTPATIENT)
Dept: PHYSICAL THERAPY | Facility: CLINIC | Age: 55
End: 2020-06-30

## 2020-06-30 DIAGNOSIS — Z98.890 HISTORY OF REVERSE TOTAL REPLACEMENT OF RIGHT SHOULDER JOINT: Primary | ICD-10-CM

## 2020-06-30 DIAGNOSIS — M25.611 SHOULDER STIFFNESS, RIGHT: ICD-10-CM

## 2020-06-30 DIAGNOSIS — M25.511 ACUTE PAIN OF RIGHT SHOULDER: ICD-10-CM

## 2020-06-30 PROCEDURE — 97110 THERAPEUTIC EXERCISES: CPT | Performed by: PHYSICAL THERAPIST

## 2020-06-30 PROCEDURE — 97530 THERAPEUTIC ACTIVITIES: CPT | Performed by: PHYSICAL THERAPIST

## 2020-07-24 DIAGNOSIS — Z00.00 ANNUAL PHYSICAL EXAM: Primary | ICD-10-CM

## 2020-07-27 ENCOUNTER — LAB (OUTPATIENT)
Dept: LAB | Facility: HOSPITAL | Age: 55
End: 2020-07-27

## 2020-07-27 ENCOUNTER — HOSPITAL ENCOUNTER (OUTPATIENT)
Dept: GENERAL RADIOLOGY | Facility: HOSPITAL | Age: 55
Discharge: HOME | End: 2020-07-27
Admitting: INTERNAL MEDICINE

## 2020-07-27 LAB
ALBUMIN SERPL-MCNC: 4.5 G/DL (ref 3.5–5.2)
ALBUMIN/GLOB SERPL: 2 G/DL
ALP SERPL-CCNC: 96 U/L (ref 39–117)
ALT SERPL W P-5'-P-CCNC: 19 U/L (ref 1–33)
ANION GAP SERPL CALCULATED.3IONS-SCNC: 6.6 MMOL/L (ref 5–15)
AST SERPL-CCNC: 24 U/L (ref 1–32)
BASOPHILS # BLD AUTO: 0.04 10*3/MM3 (ref 0–0.2)
BASOPHILS NFR BLD AUTO: 1 % (ref 0–1.5)
BILIRUB SERPL-MCNC: 0.6 MG/DL (ref 0–1.2)
BUN SERPL-MCNC: 18 MG/DL (ref 6–20)
BUN/CREAT SERPL: 23.4 (ref 7–25)
CALCIUM SPEC-SCNC: 9.2 MG/DL (ref 8.6–10.5)
CHLORIDE SERPL-SCNC: 102 MMOL/L (ref 98–107)
CHOLEST SERPL-MCNC: 237 MG/DL (ref 0–200)
CO2 SERPL-SCNC: 28.4 MMOL/L (ref 22–29)
CREAT SERPL-MCNC: 0.77 MG/DL (ref 0.57–1)
DEPRECATED RDW RBC AUTO: 45.2 FL (ref 37–54)
EOSINOPHIL # BLD AUTO: 0.11 10*3/MM3 (ref 0–0.4)
EOSINOPHIL NFR BLD AUTO: 2.7 % (ref 0.3–6.2)
ERYTHROCYTE [DISTWIDTH] IN BLOOD BY AUTOMATED COUNT: 13.3 % (ref 12.3–15.4)
GFR SERPL CREATININE-BSD FRML MDRD: 78 ML/MIN/1.73
GLOBULIN UR ELPH-MCNC: 2.3 GM/DL
GLUCOSE SERPL-MCNC: 106 MG/DL (ref 65–99)
HCT VFR BLD AUTO: 42.6 % (ref 34–46.6)
HDLC SERPL-MCNC: 49 MG/DL (ref 40–60)
HGB BLD-MCNC: 14.4 G/DL (ref 12–15.9)
IMM GRANULOCYTES # BLD AUTO: 0 10*3/MM3 (ref 0–0.05)
IMM GRANULOCYTES NFR BLD AUTO: 0 % (ref 0–0.5)
LDLC SERPL CALC-MCNC: 167 MG/DL (ref 0–100)
LDLC/HDLC SERPL: 3.4 {RATIO}
LYMPHOCYTES # BLD AUTO: 1.48 10*3/MM3 (ref 0.7–3.1)
LYMPHOCYTES NFR BLD AUTO: 36.7 % (ref 19.6–45.3)
MCH RBC QN AUTO: 31 PG (ref 26.6–33)
MCHC RBC AUTO-ENTMCNC: 33.8 G/DL (ref 31.5–35.7)
MCV RBC AUTO: 91.8 FL (ref 79–97)
MONOCYTES # BLD AUTO: 0.46 10*3/MM3 (ref 0.1–0.9)
MONOCYTES NFR BLD AUTO: 11.4 % (ref 5–12)
NEUTROPHILS NFR BLD AUTO: 1.94 10*3/MM3 (ref 1.7–7)
NEUTROPHILS NFR BLD AUTO: 48.2 % (ref 42.7–76)
NRBC BLD AUTO-RTO: 0 /100 WBC (ref 0–0.2)
PLATELET # BLD AUTO: 227 10*3/MM3 (ref 140–450)
PMV BLD AUTO: 10.9 FL (ref 6–12)
POTASSIUM SERPL-SCNC: 4.2 MMOL/L (ref 3.5–5.2)
PROT SERPL-MCNC: 6.8 G/DL (ref 6–8.5)
RBC # BLD AUTO: 4.64 10*6/MM3 (ref 3.77–5.28)
SODIUM SERPL-SCNC: 137 MMOL/L (ref 136–145)
TRIGL SERPL-MCNC: 106 MG/DL (ref 0–150)
TSH SERPL DL<=0.05 MIU/L-ACNC: 2.99 UIU/ML (ref 0.27–4.2)
VLDLC SERPL-MCNC: 21.2 MG/DL
WBC # BLD AUTO: 4.03 10*3/MM3 (ref 3.4–10.8)

## 2020-07-27 PROCEDURE — 80053 COMPREHEN METABOLIC PANEL: CPT | Performed by: INTERNAL MEDICINE

## 2020-07-27 PROCEDURE — 85025 COMPLETE CBC W/AUTO DIFF WBC: CPT | Performed by: INTERNAL MEDICINE

## 2020-07-27 PROCEDURE — 80061 LIPID PANEL: CPT | Performed by: INTERNAL MEDICINE

## 2020-07-27 PROCEDURE — 71046 X-RAY EXAM CHEST 2 VIEWS: CPT

## 2020-07-27 PROCEDURE — 36415 COLL VENOUS BLD VENIPUNCTURE: CPT | Performed by: INTERNAL MEDICINE

## 2020-07-27 PROCEDURE — 84443 ASSAY THYROID STIM HORMONE: CPT | Performed by: INTERNAL MEDICINE

## 2020-07-30 ENCOUNTER — TRANSCRIBE ORDERS (OUTPATIENT)
Dept: ADMINISTRATIVE | Facility: HOSPITAL | Age: 55
End: 2020-07-30

## 2020-07-30 ENCOUNTER — OFFICE VISIT (OUTPATIENT)
Dept: FAMILY MEDICINE CLINIC | Facility: CLINIC | Age: 55
End: 2020-07-30

## 2020-07-30 VITALS
HEIGHT: 63 IN | BODY MASS INDEX: 24.17 KG/M2 | RESPIRATION RATE: 14 BRPM | HEART RATE: 74 BPM | DIASTOLIC BLOOD PRESSURE: 84 MMHG | SYSTOLIC BLOOD PRESSURE: 120 MMHG | TEMPERATURE: 97.7 F | WEIGHT: 136.4 LBS | OXYGEN SATURATION: 98 %

## 2020-07-30 DIAGNOSIS — Z00.00 WELLNESS EXAMINATION: Primary | ICD-10-CM

## 2020-07-30 DIAGNOSIS — Z78.0 POST-MENOPAUSE: ICD-10-CM

## 2020-07-30 DIAGNOSIS — E78.01 FAMILIAL HYPERCHOLESTEROLEMIA: ICD-10-CM

## 2020-07-30 DIAGNOSIS — Z00.00 ANNUAL PHYSICAL EXAM: Primary | ICD-10-CM

## 2020-07-30 PROCEDURE — 99396 PREV VISIT EST AGE 40-64: CPT | Performed by: INTERNAL MEDICINE

## 2020-07-30 RX ORDER — MULTIVIT WITH MINERALS/LUTEIN
1000 TABLET ORAL DAILY
COMMUNITY

## 2020-07-30 RX ORDER — MULTIVIT WITH MINERALS/LUTEIN
250 TABLET ORAL DAILY
COMMUNITY

## 2020-07-30 RX ORDER — B-COMPLEX WITH VITAMIN C
TABLET ORAL
COMMUNITY

## 2020-07-30 NOTE — PROGRESS NOTES
Subjective   Maricarmen Hayes is a 55 y.o. female. Patient is here today for   Chief Complaint   Patient presents with   • Annual Exam          Vitals:    07/30/20 1048   BP: 120/84   Pulse: 74   Resp: 14   Temp: 97.7 °F (36.5 °C)   SpO2: 98%     Body mass index is 24.16 kg/m².    The following portions of the patient's history were reviewed and updated as appropriate: allergies, current medications, past family history, past medical history, past social history, past surgical history and problem list.    Past Medical History:   Diagnosis Date   • Abnormal Pap smear of cervix    • Anemia    • Fibrocystic breast    • Hyperlipidemia       No Known Allergies   Social History     Socioeconomic History   • Marital status:      Spouse name: Ralph   • Number of children: 2   • Years of education: 14   • Highest education level: Not on file   Occupational History   • Occupation: sales   Tobacco Use   • Smoking status: Never Smoker   • Smokeless tobacco: Never Used   Substance and Sexual Activity   • Alcohol use: Yes     Comment: MONTHLY 1-2 DRINKS   • Drug use: No   • Sexual activity: Yes     Partners: Male     Birth control/protection: Surgical     Comment: HYSTERECTOMY  2015        Current Outpatient Medications:   •  Calcium Carbonate (Caltrate 600) 1500 (600 Ca) MG tablet, Take 600 mg by mouth Daily., Disp: , Rfl:   •  NON FORMULARY, HOLD PRIOR TO SURGERY Testosterone/Versabase cream, 2%, using pea sized amount 2 to 3 times weekly., Disp: , Rfl:   •  vitamin C (ASCORBIC ACID) 250 MG tablet, Take 250 mg by mouth Daily., Disp: , Rfl:   •  vitamin E 1000 UNIT capsule, Take 1,000 Units by mouth Daily., Disp: , Rfl:   •  Zinc 100 MG tablet, Take  by mouth., Disp: , Rfl:      EKG not done.  Was normal in February preop.    Objective   History of Present Illness Maricarmen is here for an annual physical examination.  She feels well.  She has been pretty healthy.  She eats healthy does try to exercise.  She has lost 20 pounds in  the last 18 months.  She does have a mild elevation of LDL cholesterol in the last year.  She had a right reverse total shoulder arthroplasty in February and did well.  She sees her gynecologist annually for an exam.  Last bone density test was in early 2018 and showed minimal hip osteopenia.  Last mammogram was in September 2018.  She had normal carotid vascular screening in 2016.  Today she is complains of some right hip pain that started after she did some heavy yard work.  The hip is getting better.  She has taken some ibuprofen.  She monitors her blood pressure and reports normal readings.    Review of Systems   Constitutional: Positive for activity change.   Eyes:        Exam this year   Respiratory: Negative for cough and shortness of breath.    Cardiovascular: Negative.    Gastrointestinal: Negative.    Musculoskeletal:        As in HPI   Neurological: Negative.    Psychiatric/Behavioral: Negative.        Physical Exam   Constitutional: She appears well-developed and well-nourished.   HENT:   Nose: Nose normal.   Mouth/Throat: Oropharynx is clear and moist.   Eyes: Pupils are equal, round, and reactive to light. EOM are normal.   Neck: No thyromegaly present.   Cardiovascular: Normal rate, regular rhythm and normal heart sounds.   Pulmonary/Chest: Effort normal and breath sounds normal.   Abdominal: Soft. Bowel sounds are normal. There is no tenderness.   Musculoskeletal: She exhibits no edema.   Right hip has normal range of motion.  There is minimal point tenderness   Neurological: She is alert.   Skin: Skin is warm and dry.   Psychiatric: She has a normal mood and affect. Her behavior is normal. Judgment and thought content normal.   Vitals reviewed.      ASSESSMENT     Problem List Items Addressed This Visit        Cardiovascular and Mediastinum    Hyperlipidemia       Other    Annual physical exam - Primary      Other Visit Diagnoses     Post-menopause        Relevant Orders    DEXA Bone Density Axial           PLAN  Patient Instructions   Blood pressure is stable.  Total and LDL cholesterol are high and much higher than last year which is unusual given your proper diet and weight loss..  Fasting blood sugar is mildly elevated.  A complete blood count, kidney functions, liver functions, and thyroid-stimulating hormone level are normal.  Chest x-ray is normal.  Suggest getting vascular screening tests.  Will order another bone density test.  You also need another screening mammogram.      Return in about 6 months (around 1/30/2021) for labs lipid,bmp,a1c.

## 2020-07-30 NOTE — PATIENT INSTRUCTIONS
Blood pressure is stable.  Total and LDL cholesterol are high and much higher than last year which is unusual given your proper diet and weight loss..  Fasting blood sugar is mildly elevated.  A complete blood count, kidney functions, liver functions, and thyroid-stimulating hormone level are normal.  Chest x-ray is normal.  Suggest getting vascular screening tests.  Will order another bone density test.  You also need another screening mammogram.

## 2020-08-18 ENCOUNTER — HOSPITAL ENCOUNTER (OUTPATIENT)
Dept: CARDIOLOGY | Facility: HOSPITAL | Age: 55
Discharge: HOME | End: 2020-08-18
Admitting: INTERNAL MEDICINE

## 2020-08-18 VITALS
HEART RATE: 68 BPM | WEIGHT: 137 LBS | SYSTOLIC BLOOD PRESSURE: 124 MMHG | BODY MASS INDEX: 24.27 KG/M2 | HEIGHT: 63 IN | DIASTOLIC BLOOD PRESSURE: 80 MMHG

## 2020-08-18 DIAGNOSIS — Z00.00 WELLNESS EXAMINATION: ICD-10-CM

## 2020-08-18 LAB
BH CV ECHO MEAS - DIST AO DIAM: 1.46 CM
BH CV VAS BP LEFT ARM: NORMAL MMHG
BH CV VAS BP RIGHT ARM: NORMAL MMHG
BH CV XLRA MEAS - MID AO DIAM: 1.57 CM
BH CV XLRA MEAS - PAD LEFT ABI DP: 1.13
BH CV XLRA MEAS - PAD LEFT ABI PT: 1.2
BH CV XLRA MEAS - PAD LEFT ARM: 124 MMHG
BH CV XLRA MEAS - PAD LEFT LEG DP: 140 MMHG
BH CV XLRA MEAS - PAD LEFT LEG PT: 150 MMHG
BH CV XLRA MEAS - PAD RIGHT ABI DP: 1.13
BH CV XLRA MEAS - PAD RIGHT ABI PT: 1.2
BH CV XLRA MEAS - PAD RIGHT ARM: 124 MMHG
BH CV XLRA MEAS - PAD RIGHT LEG DP: 140 MMHG
BH CV XLRA MEAS - PAD RIGHT LEG PT: 150 MMHG
BH CV XLRA MEAS - PROX AO DIAM: 1.65 CM
BH CV XLRA MEAS LEFT ICA/CCA RATIO: 0.96
BH CV XLRA MEAS LEFT MID CCA PSV: NORMAL CM/SEC
BH CV XLRA MEAS LEFT MID ICA PSV: NORMAL CM/SEC
BH CV XLRA MEAS LEFT PROX ECA PSV: NORMAL CM/SEC
BH CV XLRA MEAS RIGHT ICA/CCA RATIO: 1.15
BH CV XLRA MEAS RIGHT MID CCA PSV: NORMAL CM/SEC
BH CV XLRA MEAS RIGHT MID ICA PSV: NORMAL CM/SEC
BH CV XLRA MEAS RIGHT PROX ECA PSV: NORMAL CM/SEC

## 2020-08-18 PROCEDURE — 93799 UNLISTED CV SVC/PROCEDURE: CPT

## 2020-09-25 ENCOUNTER — HOSPITAL ENCOUNTER (OUTPATIENT)
Dept: BONE DENSITY | Facility: HOSPITAL | Age: 55
Discharge: HOME | End: 2020-09-25
Admitting: INTERNAL MEDICINE

## 2020-09-25 PROCEDURE — 77080 DXA BONE DENSITY AXIAL: CPT

## 2020-12-18 ENCOUNTER — OFFICE VISIT (OUTPATIENT)
Dept: OBSTETRICS AND GYNECOLOGY | Age: 55
End: 2020-12-18

## 2020-12-18 VITALS
SYSTOLIC BLOOD PRESSURE: 122 MMHG | WEIGHT: 138 LBS | HEIGHT: 63 IN | BODY MASS INDEX: 24.45 KG/M2 | DIASTOLIC BLOOD PRESSURE: 68 MMHG

## 2020-12-18 DIAGNOSIS — Z12.4 ROUTINE CERVICAL SMEAR: ICD-10-CM

## 2020-12-18 DIAGNOSIS — Z01.419 WELL FEMALE EXAM WITH ROUTINE GYNECOLOGICAL EXAM: Primary | ICD-10-CM

## 2020-12-18 DIAGNOSIS — Z12.31 SCREENING MAMMOGRAM, ENCOUNTER FOR: ICD-10-CM

## 2020-12-18 DIAGNOSIS — Z11.51 SPECIAL SCREENING EXAMINATION FOR HUMAN PAPILLOMAVIRUS (HPV): ICD-10-CM

## 2020-12-18 PROBLEM — Z00.00 ANNUAL PHYSICAL EXAM: Status: RESOLVED | Noted: 2020-07-30 | Resolved: 2020-12-18

## 2020-12-18 PROCEDURE — 99396 PREV VISIT EST AGE 40-64: CPT | Performed by: OBSTETRICS & GYNECOLOGY

## 2020-12-18 NOTE — PROGRESS NOTES
Routine Annual Visit    2020    Patient: Maricarmen Hayes          MR#:[id number]    History of Present Illness    Chief Complaint   Patient presents with   • Gynecologic Exam     last pap 2019 neg, last mg 2018 neg, last DEXA 2020, C-scope 2016       55 y.o. female  who presents for annual exam.    The patient presents for routine annual exam feeling well without complaints.  She and her  have worked aggressively in the last year to try to update her exercise.  The patient reports some modest response in libido from the testosterone    No LMP recorded (lmp unknown). Patient has had a hysterectomy.  Obstetric History:  OB History        3    Para   2    Term   2            AB   1    Living   2       SAB        TAB        Ectopic   1    Molar        Multiple        Live Births   2               Menstrual History:     No LMP recorded (lmp unknown). Patient has had a hysterectomy.       Sexual History:       ________________________________________  Patient Active Problem List   Diagnosis   • Hyperlipidemia   • Well female exam with routine gynecological exam   • Elevated fasting blood sugar   • Elevated blood pressure reading in office without diagnosis of hypertension   • Acute pain of right shoulder   • Osteoarthritis of right shoulder   • Decreased libido   • S/P reverse total shoulder arthroplasty, right     Past Medical History:   Diagnosis Date   • Abnormal Pap smear of cervix    • Anemia    • Fibrocystic breast    • Hyperlipidemia      Past Surgical History:   Procedure Laterality Date   • BILATERAL SALPINGO OOPHORECTOMY  2015    Dr. Reynolds   • BLEPHAROPLASTY Bilateral 2019    Procedure: BLEPHAROPLASTY;  Surgeon: Kye Guerrero MD;  Location: [facility] OR;  Service: Plastics   • BROW LIFT AND BLEPHAROPLASTY Bilateral 2019    Procedure: BROW LIFT AND  BILATERAL UPPER LID BLEPHAROPLASTY;  Surgeon: Kye Guerrero MD;  Location: [facility]  OR;  Service: Plastics   •  SECTION     • COLONOSCOPY N/A 2016    Procedure: COLONOSCOPY;  Surgeon: Devon Campos MD;  Location: [facility] ENDOSCOPY;  Service:    • OOPHORECTOMY     • SUPRACERVICAL HYSTERECTOMY SALPINGO OOPHORECTOMY  2015    supracervical hyst/ [facility]    • TOTAL SHOULDER ARTHROPLASTY W/ DISTAL CLAVICLE EXCISION Right 2020    Procedure: RIGHT TOTAL SHOULDER REVERSE ARTHROPLASTY WITH DISTAL CLAVICLE EXCISION;  Surgeon: Humberto Up MD;  Location:  [facility];  Service: Orthopedics;  Laterality: Right;   • TUBAL ABDOMINAL LIGATION       Social History     Tobacco Use   Smoking Status Never Smoker   Smokeless Tobacco Never Used     Family History   Problem Relation Age of Onset   • Rheum arthritis Maternal Aunt    • Hyperlipidemia Maternal Aunt    • Ovarian cancer Maternal Aunt    • Alzheimer's disease Paternal Grandmother    • Hyperlipidemia Maternal Grandmother    • Hyperlipidemia Mother    • Ovarian cancer Mother    • Rheum arthritis Mother    • Hyperlipidemia Maternal Aunt    • Ovarian cancer Maternal Aunt    • Arthritis Father    • Malig Hyperthermia Neg Hx      Prior to Admission medications    Medication Sig Start Date End Date Taking? Authorizing Provider   Calcium Carbonate (Caltrate 600) 1500 (600 Ca) MG tablet Take 600 mg by mouth Daily.   Yes Shante Collazo MD   NON FORMULARY HOLD PRIOR TO SURGERY  Testosterone/Versabase cream, 2%, using pea sized amount 2 to 3 times weekly.   Yes Shante Collazo MD   vitamin C (ASCORBIC ACID) 250 MG tablet Take 250 mg by mouth Daily.   Yes Shante Collazo MD   vitamin E 1000 UNIT capsule Take 1,000 Units by mouth Daily.   Yes Shante Collazo MD   Zinc 100 MG tablet Take  by mouth.   Yes Shante Collazo MD     ________________________________________    Current contraception: status post hysterectomy  History of abnormal Pap smear: no  Family history of uterine or ovarian cancer:  "no  Family History of colon cancer/colon polyps: no  History of abnormal mammogram: no  History of abnormal lipids: no    The following portions of the patient's history were reviewed and updated as appropriate: allergies, current medications, past family history, past medical history, past social history, past surgical history and problem list.    Review of Systems    Pertinent items are noted in HPI.       Objective   Physical Exam    /68   Ht 160 cm (63\")   Wt 62.6 kg (138 lb)   LMP  (LMP Unknown)   Breastfeeding No   BMI 24.45 kg/m²    BP Readings from Last 3 Encounters:   12/18/20 122/68   08/18/20 124/80   07/30/20 120/84      Wt Readings from Last 3 Encounters:   12/18/20 62.6 kg (138 lb)   08/18/20 62.1 kg (137 lb)   07/30/20 61.9 kg (136 lb 6.4 oz)        BMI: Estimated body mass index is 24.45 kg/m² as calculated from the following:    Height as of this encounter: 160 cm (63\").    Weight as of this encounter: 62.6 kg (138 lb).       General: alert, appears stated age and cooperative   Heart: regular rate and rhythm, S1, S2 normal, no murmur, click, rub or gallop   Lungs: clear to auscultation bilaterally   Abdomen: soft, non-tender, without masses, no organomegaly   Breast: inspection negative, no nipple discharge or bleeding, no masses or nodularity palpable   External genitalia/Vulva: External genitalia including bartholin's glands, Urethra, [place]'s gland and urethra meatus are normal, Perineum, rectum and anus appear normal  and Bladder appears normal without significant prolapse    Vagina: normal mucosa, normal discharge   Cervix: absent   Uterus: absent    Adnexa: normal adnexa     As part of wellness and prevention, the following topics were discussed with the patient:  Encouraged self breast exam  Physical activity and regular exercised encouraged.       Assessment:    Diagnoses and all orders for this visit:    1. Well female exam with routine gynecological exam (Primary)    2. Screening " mammogram, encounter for  -     Mammo Screening Digital Tomosynthesis Bilateral With CAD; Future        Plan:  Return in about 1 year (around 12/18/2021) for Annual GYN exam.      Vern Reynolds MD  12/18/2020 08:51 EST

## 2021-01-25 DIAGNOSIS — R73.01 ELEVATED FASTING BLOOD SUGAR: ICD-10-CM

## 2021-01-25 DIAGNOSIS — E78.01 FAMILIAL HYPERCHOLESTEROLEMIA: Primary | ICD-10-CM

## 2021-02-03 ENCOUNTER — OFFICE VISIT (OUTPATIENT)
Dept: FAMILY MEDICINE CLINIC | Facility: CLINIC | Age: 56
End: 2021-02-03

## 2021-02-03 VITALS
BODY MASS INDEX: 25.2 KG/M2 | HEART RATE: 75 BPM | DIASTOLIC BLOOD PRESSURE: 68 MMHG | SYSTOLIC BLOOD PRESSURE: 110 MMHG | TEMPERATURE: 97.5 F | HEIGHT: 63 IN | OXYGEN SATURATION: 99 % | RESPIRATION RATE: 18 BRPM | WEIGHT: 142.2 LBS

## 2021-02-03 DIAGNOSIS — E78.01 FAMILIAL HYPERCHOLESTEROLEMIA: ICD-10-CM

## 2021-02-03 DIAGNOSIS — R20.0 NUMBNESS OF LEFT FOOT: Primary | ICD-10-CM

## 2021-02-03 PROCEDURE — 99213 OFFICE O/P EST LOW 20 MIN: CPT | Performed by: INTERNAL MEDICINE

## 2021-02-03 NOTE — PROGRESS NOTES
Subjective   Maricarmen Hayes is a 56 y.o. female. Patient is here today for   Chief Complaint   Patient presents with   • Hyperlipidemia     lab follow up   • Med Refill          Vitals:    02/03/21 0833   BP: 110/68   Pulse: 75   Resp: 18   Temp: 97.5 °F (36.4 °C)   SpO2: 99%     Body mass index is 25.19 kg/m².      The following portions of the patient's history were reviewed and updated as appropriate: allergies, current medications, past family history, past medical history, past social history, past surgical history and problem list.    Past Medical History:   Diagnosis Date   • Abnormal Pap smear of cervix    • Anemia    • Fibrocystic breast    • Hyperlipidemia       No Known Allergies   Social History     Socioeconomic History   • Marital status:      Spouse name: Ralph   • Number of children: 2   • Years of education: 14   • Highest education level: Not on file   Occupational History   • Occupation: sales   Tobacco Use   • Smoking status: Never Smoker   • Smokeless tobacco: Never Used   Substance and Sexual Activity   • Alcohol use: Yes     Comment: MONTHLY 1-2 DRINKS   • Drug use: No   • Sexual activity: Yes     Partners: Male     Birth control/protection: Surgical     Comment: HYSTERECTOMY  2015        Current Outpatient Medications:   •  Calcium Carbonate (Caltrate 600) 1500 (600 Ca) MG tablet, Take 600 mg by mouth Daily., Disp: , Rfl:   •  NON FORMULARY, HOLD PRIOR TO SURGERY Testosterone/Versabase cream, 2%, using pea sized amount 2 to 3 times weekly., Disp: , Rfl:   •  vitamin C (ASCORBIC ACID) 250 MG tablet, Take 250 mg by mouth Daily., Disp: , Rfl:   •  vitamin E 1000 UNIT capsule, Take 1,000 Units by mouth Daily., Disp: , Rfl:   •  Zinc 100 MG tablet, Take  by mouth., Disp: , Rfl:      Objective   History of Present Illness Shawn is here for blood pressure check and lab follow-up.  She has familial hyperlipidemia and elevated fasting glucose.  She feels well.  She eats healthy and walks 4 days  a week for exercise.  Her weight is been stable.  She had vascular screening test last year which were normal.  Her grandparents had cardiovascular disease.  Today she also complains of some numbness in her left foot when she walks.  She denies any back pain.    Review of Systems   Constitutional: Negative for activity change.   Respiratory: Negative for cough.    Cardiovascular: Negative.    Genitourinary: Negative.    Neurological: Negative.    Psychiatric/Behavioral: Negative.        Physical Exam  Vitals signs reviewed.   Constitutional:       Appearance: Normal appearance.   Cardiovascular:      Rate and Rhythm: Normal rate and regular rhythm.      Heart sounds: Normal heart sounds.      Comments: 125/65  Pulmonary:      Effort: Pulmonary effort is normal.      Breath sounds: Normal breath sounds.   Neurological:      Mental Status: She is alert.      Motor: No weakness.      Deep Tendon Reflexes: Reflexes normal.   Psychiatric:         Mood and Affect: Mood normal.         Behavior: Behavior normal.         Thought Content: Thought content normal.         Judgment: Judgment normal.         ASSESSMENT     Problems Addressed this Visit        Cardiac and Vasculature    Hyperlipidemia    Relevant Orders    CT Cardiac Calcium Score Without Dye       Symptoms and Signs    Numbness of left foot - Primary      Diagnoses       Codes Comments    Numbness of left foot    -  Primary ICD-10-CM: R20.8  ICD-9-CM: 782.0     Familial hypercholesterolemia     ICD-10-CM: E78.01  ICD-9-CM: 272.0           PLAN  There are no Patient Instructions on file for this visit.    No follow-ups on file.   English

## 2021-02-03 NOTE — PATIENT INSTRUCTIONS
Blood pressure is stable.  Total and LDL cholesterol are high.  HDL cholesterol is 45.  Ideally it should be above 50.  Triglycerides are mildly elevated.  Fasting glucose and hemoglobin A1c are normal.  Discussed indications to take cholesterol medicine.  Perry risk is 2%.  We will check a coronary artery calcium score and follow-up to discuss results if abnormal.  Continue diet and exercise.  If numbness of the foot worsens suggest getting nerve conduction studies.

## 2021-02-17 ENCOUNTER — APPOINTMENT (OUTPATIENT)
Dept: MAMMOGRAPHY | Facility: HOSPITAL | Age: 56
End: 2021-02-17

## 2021-03-15 ENCOUNTER — HOSPITAL ENCOUNTER (OUTPATIENT)
Dept: CT IMAGING | Facility: HOSPITAL | Age: 56
Discharge: HOME | End: 2021-03-15
Admitting: INTERNAL MEDICINE

## 2021-03-15 PROCEDURE — 75571 CT HRT W/O DYE W/CA TEST: CPT

## 2021-03-26 ENCOUNTER — BULK ORDERING (OUTPATIENT)
Dept: CASE MANAGEMENT | Facility: OTHER | Age: 56
End: 2021-03-26

## 2021-03-26 DIAGNOSIS — Z23 IMMUNIZATION DUE: ICD-10-CM

## 2021-04-06 ENCOUNTER — TELEPHONE (OUTPATIENT)
Dept: FAMILY MEDICINE CLINIC | Facility: CLINIC | Age: 56
End: 2021-04-06

## 2021-04-06 NOTE — TELEPHONE ENCOUNTER
Discussed results of coronary artery calcium CT.  Score was 0.  We will follow-up in January with lab work.

## 2021-04-07 ENCOUNTER — HOSPITAL ENCOUNTER (OUTPATIENT)
Dept: MAMMOGRAPHY | Facility: HOSPITAL | Age: 56
Discharge: HOME | End: 2021-04-07
Admitting: OBSTETRICS & GYNECOLOGY

## 2021-04-07 DIAGNOSIS — Z12.31 SCREENING MAMMOGRAM, ENCOUNTER FOR: ICD-10-CM

## 2021-04-07 PROCEDURE — 77067 SCR MAMMO BI INCL CAD: CPT

## 2021-04-07 PROCEDURE — 77063 BREAST TOMOSYNTHESIS BI: CPT

## 2021-05-25 ENCOUNTER — OFFICE VISIT (OUTPATIENT)
Dept: FAMILY MEDICINE CLINIC | Facility: CLINIC | Age: 56
End: 2021-05-25

## 2021-05-25 ENCOUNTER — HOSPITAL ENCOUNTER (OUTPATIENT)
Dept: GENERAL RADIOLOGY | Facility: HOSPITAL | Age: 56
Discharge: HOME | End: 2021-05-25
Admitting: INTERNAL MEDICINE

## 2021-05-25 VITALS
RESPIRATION RATE: 16 BRPM | OXYGEN SATURATION: 100 % | HEART RATE: 70 BPM | BODY MASS INDEX: 25.09 KG/M2 | DIASTOLIC BLOOD PRESSURE: 80 MMHG | TEMPERATURE: 98 F | WEIGHT: 141.6 LBS | HEIGHT: 63 IN | SYSTOLIC BLOOD PRESSURE: 130 MMHG

## 2021-05-25 DIAGNOSIS — G89.29 HIP PAIN, CHRONIC, RIGHT: Primary | ICD-10-CM

## 2021-05-25 DIAGNOSIS — M25.551 HIP PAIN, CHRONIC, RIGHT: Primary | ICD-10-CM

## 2021-05-25 PROCEDURE — 99213 OFFICE O/P EST LOW 20 MIN: CPT | Performed by: INTERNAL MEDICINE

## 2021-05-25 PROCEDURE — 73502 X-RAY EXAM HIP UNI 2-3 VIEWS: CPT

## 2021-05-25 NOTE — PROGRESS NOTES
Subjective   Maricarmen Hayes is a 56 y.o. female. Patient is here today for   Chief Complaint   Patient presents with   • Fall     PT FELL A COUPLE OF MONTHS AGO AND HURT RIGHT BUTT CHEEK AND SHE IS STILL HAVING PAIN THERE          Vitals:    05/25/21 1305   BP: 130/80   Pulse: 70   Resp: 16   Temp: 98 °F (36.7 °C)   SpO2: 100%     Body mass index is 25.09 kg/m².    The following portions of the patient's history were reviewed and updated as appropriate: allergies, current medications, past family history, past medical history, past social history, past surgical history and problem list.    Past Medical History:   Diagnosis Date   • Abnormal Pap smear of cervix    • Anemia    • Fibrocystic breast    • Hyperlipidemia       No Known Allergies   Social History     Socioeconomic History   • Marital status:      Spouse name: Ralph   • Number of children: 2   • Years of education: 14   • Highest education level: Not on file   Tobacco Use   • Smoking status: Never Smoker   • Smokeless tobacco: Never Used   Substance and Sexual Activity   • Alcohol use: Yes     Comment: MONTHLY 1-2 DRINKS   • Drug use: No   • Sexual activity: Yes     Partners: Male     Birth control/protection: Surgical     Comment: HYSTERECTOMY  2015        Current Outpatient Medications:   •  Calcium Carbonate (Caltrate 600) 1500 (600 Ca) MG tablet, Take 600 mg by mouth Daily., Disp: , Rfl:   •  vitamin C (ASCORBIC ACID) 250 MG tablet, Take 250 mg by mouth Daily., Disp: , Rfl:   •  vitamin E 1000 UNIT capsule, Take 1,000 Units by mouth Daily., Disp: , Rfl:   •  Zinc 100 MG tablet, Take  by mouth., Disp: , Rfl:   •  NON FORMULARY, HOLD PRIOR TO SURGERY Testosterone/Versabase cream, 2%, using pea sized amount 2 to 3 times weekly., Disp: , Rfl:      Objective     History of Present Illness Maricarmen was walking down steps with socks on 3 months ago and slipped on the last step landing on her right hip.  She had quite a bit of pain for a few days which  improved and also had bruising of the muscle and an indentation of the muscle.  She continues to have pain at night when she lies on the hip.  She has been doing spinning classes with her daughter.  She has not taken any medicine for the pain.    Review of Systems   Constitutional: Negative for activity change.   Musculoskeletal: Negative for back pain.   Neurological: Negative for weakness and numbness.       Physical Exam  Vitals reviewed.   Constitutional:       Appearance: Normal appearance.   Neurological:      Mental Status: She is alert.   Psychiatric:         Mood and Affect: Mood normal.         Behavior: Behavior normal.         Thought Content: Thought content normal.         Judgment: Judgment normal.         ASSESSMENT     Problems Addressed this Visit        Musculoskeletal and Injuries    Hip pain, chronic, right - Primary    Relevant Orders    XR Hip With or Without Pelvis 2 - 3 View Right      Diagnoses       Codes Comments    Hip pain, chronic, right    -  Primary ICD-10-CM: M25.551, G89.29  ICD-9-CM: 719.45, 338.29           PLAN  There are no Patient Instructions on file for this visit.  No follow-ups on file.

## 2021-05-25 NOTE — PATIENT INSTRUCTIONS
Will x-ray the hip to look for osteoarthritis and/or fracture.  After review of results will plan on prescribing an anti-inflammatory and possibly ordering some physical therapy.

## 2021-06-02 ENCOUNTER — TELEPHONE (OUTPATIENT)
Dept: FAMILY MEDICINE CLINIC | Facility: CLINIC | Age: 56
End: 2021-06-02

## 2021-06-02 NOTE — TELEPHONE ENCOUNTER
PER DR. MONZON, XRAY WAS NORMAL. HE CALLED AND LEFT A VOICEMAIL FOR THE PATIENT ADVISING HER OF RESULTS.

## 2021-06-02 NOTE — TELEPHONE ENCOUNTER
PATIENT HAD AN X-RAY OF HER HIP ON 05/25/21. SHE HAS NOT GOTTEN THE RESULTS YET.    PLEASE CALL  [phone]   OK TO LEAVE A MESSAGE

## 2021-10-27 ENCOUNTER — TELEPHONE (OUTPATIENT)
Dept: FAMILY MEDICINE CLINIC | Facility: CLINIC | Age: 56
End: 2021-10-27

## 2021-10-27 NOTE — TELEPHONE ENCOUNTER
Caller: Maricarmen Hayes    Relationship: Self    Best call back number: [phone]    What orders are you requesting (i.e. lab or imaging): X-RAY OF LOWER BACK, ESPECIALLY ON THE RIGHT SIDE SHOOTING DOWN THE LEG.    In what timeframe would the patient need to come in: ASAP    Where will you receive your lab/imaging services: [facility]

## 2021-11-02 ENCOUNTER — HOSPITAL ENCOUNTER (OUTPATIENT)
Dept: GENERAL RADIOLOGY | Facility: HOSPITAL | Age: 56
Discharge: HOME | End: 2021-11-02
Admitting: INTERNAL MEDICINE

## 2021-11-02 ENCOUNTER — OFFICE VISIT (OUTPATIENT)
Dept: FAMILY MEDICINE CLINIC | Facility: CLINIC | Age: 56
End: 2021-11-02

## 2021-11-02 VITALS
BODY MASS INDEX: 24.95 KG/M2 | TEMPERATURE: 97 F | RESPIRATION RATE: 18 BRPM | OXYGEN SATURATION: 98 % | HEIGHT: 63 IN | HEART RATE: 67 BPM | SYSTOLIC BLOOD PRESSURE: 120 MMHG | WEIGHT: 140.8 LBS | DIASTOLIC BLOOD PRESSURE: 90 MMHG

## 2021-11-02 DIAGNOSIS — M54.41 CHRONIC RIGHT-SIDED LOW BACK PAIN WITH RIGHT-SIDED SCIATICA: Primary | ICD-10-CM

## 2021-11-02 DIAGNOSIS — G89.29 CHRONIC RIGHT-SIDED LOW BACK PAIN WITH RIGHT-SIDED SCIATICA: Primary | ICD-10-CM

## 2021-11-02 PROBLEM — M54.40 CHRONIC RIGHT-SIDED LOW BACK PAIN WITH SCIATICA: Status: ACTIVE | Noted: 2021-11-02

## 2021-11-02 PROCEDURE — 99213 OFFICE O/P EST LOW 20 MIN: CPT | Performed by: INTERNAL MEDICINE

## 2021-11-02 PROCEDURE — 72100 X-RAY EXAM L-S SPINE 2/3 VWS: CPT

## 2021-11-02 RX ORDER — METHYLPREDNISOLONE 4 MG/1
TABLET ORAL
Qty: 21 TABLET | Refills: 0 | Status: SHIPPED | OUTPATIENT
Start: 2021-11-02 | End: 2022-03-25

## 2021-11-02 NOTE — PROGRESS NOTES
Subjective   Maricarmen Hayes is a 56 y.o. female. Patient is here today for   Chief Complaint   Patient presents with   • Back Pain     back pain she would like an xray          Vitals:    11/02/21 1254   BP: 120/90   Pulse: 67   Resp: 18   Temp: 97 °F (36.1 °C)   SpO2: 98%     Body mass index is 24.95 kg/m².    The following portions of the patient's history were reviewed and updated as appropriate: allergies, current medications, past family history, past medical history, past social history, past surgical history and problem list.    Past Medical History:   Diagnosis Date   • Abnormal Pap smear of cervix    • Anemia    • Fibrocystic breast    • Hyperlipidemia       No Known Allergies   Social History     Socioeconomic History   • Marital status:      Spouse name: Ralhp   • Number of children: 2   • Years of education: 14   Tobacco Use   • Smoking status: Never Smoker   • Smokeless tobacco: Never Used   Substance and Sexual Activity   • Alcohol use: Yes     Comment: MONTHLY 1-2 DRINKS   • Drug use: No   • Sexual activity: Yes     Partners: Male     Birth control/protection: Surgical     Comment: HYSTERECTOMY  2015        Current Outpatient Medications:   •  Calcium Carbonate (Caltrate 600) 1500 (600 Ca) MG tablet, Take 600 mg by mouth Daily., Disp: , Rfl:   •  NON FORMULARY, HOLD PRIOR TO SURGERY Testosterone/Versabase cream, 2%, using pea sized amount 2 to 3 times weekly., Disp: , Rfl:   •  vitamin C (ASCORBIC ACID) 250 MG tablet, Take 250 mg by mouth Daily., Disp: , Rfl:   •  vitamin E 1000 UNIT capsule, Take 1,000 Units by mouth Daily., Disp: , Rfl:   •  Zinc 100 MG tablet, Take  by mouth., Disp: , Rfl:      Objective     History of Present Illness Maricarmen fell last May landing on her buttocks.  She now has lower back pain and right leg pain and weakness.  The pain is 10 out of 10 at i worst and 6 out of 10 at present.  There is leg weakness and numbness.  She is taking Aleve with some relief.  She has no  prior history of back problems or leg pains.    Review of Systems   Constitutional: Negative for activity change.   Respiratory: Negative.    Cardiovascular: Negative.    Genitourinary: Negative.    Neurological: Positive for weakness and numbness.       Physical Exam  Vitals reviewed.   Constitutional:       Appearance: Normal appearance.   Neurological:      Mental Status: She is alert.      Motor: Weakness present.      Deep Tendon Reflexes:      Reflex Scores:       Patellar reflexes are 2+ on the right side and 2+ on the left side.       Achilles reflexes are 0 on the right side and 2+ on the left side.     Comments: SLR is positive         ASSESSMENT you have a right S1 radiculopathy.  Will check a lumbosacral spine x-ray.  Take Medrol Dosepak as prescribed.  You will most likely require a lumbosacral MRI scan.     Problems Addressed this Visit        Musculoskeletal and Injuries    Chronic right-sided low back pain with sciatica - Primary      Diagnoses       Codes Comments    Chronic right-sided low back pain with right-sided sciatica    -  Primary ICD-10-CM: M54.41, G89.29  ICD-9-CM: 724.2, 724.3, 338.29           PLAN  There are no Patient Instructions on file for this visit.  No follow-ups on file.

## 2021-11-04 ENCOUNTER — TELEPHONE (OUTPATIENT)
Dept: FAMILY MEDICINE CLINIC | Facility: CLINIC | Age: 56
End: 2021-11-04

## 2021-11-04 DIAGNOSIS — G89.29 CHRONIC RIGHT-SIDED LOW BACK PAIN WITH RIGHT-SIDED SCIATICA: Primary | ICD-10-CM

## 2021-11-04 DIAGNOSIS — M54.41 CHRONIC RIGHT-SIDED LOW BACK PAIN WITH RIGHT-SIDED SCIATICA: Primary | ICD-10-CM

## 2021-12-03 ENCOUNTER — HOSPITAL ENCOUNTER (OUTPATIENT)
Dept: MRI IMAGING | Facility: HOSPITAL | Age: 56
Discharge: HOME | End: 2021-12-03
Admitting: INTERNAL MEDICINE

## 2021-12-03 PROCEDURE — 72148 MRI LUMBAR SPINE W/O DYE: CPT

## 2021-12-09 ENCOUNTER — TELEPHONE (OUTPATIENT)
Dept: FAMILY MEDICINE CLINIC | Facility: CLINIC | Age: 56
End: 2021-12-09

## 2021-12-09 NOTE — TELEPHONE ENCOUNTER
Caller: Maricarmen Hayes    Relationship: Self    Best call back number: [phone]    Caller requesting test results: PATIENT     What test was performed: MRI     When was the test performed: 12/03/21

## 2021-12-10 ENCOUNTER — TELEPHONE (OUTPATIENT)
Dept: FAMILY MEDICINE CLINIC | Facility: CLINIC | Age: 56
End: 2021-12-10

## 2022-01-11 ENCOUNTER — OFFICE VISIT (OUTPATIENT)
Dept: FAMILY MEDICINE CLINIC | Facility: CLINIC | Age: 57
End: 2022-01-11

## 2022-01-11 VITALS
OXYGEN SATURATION: 100 % | RESPIRATION RATE: 18 BRPM | SYSTOLIC BLOOD PRESSURE: 121 MMHG | BODY MASS INDEX: 25.76 KG/M2 | WEIGHT: 145.4 LBS | TEMPERATURE: 97.4 F | HEART RATE: 73 BPM | HEIGHT: 63 IN | DIASTOLIC BLOOD PRESSURE: 79 MMHG

## 2022-01-11 DIAGNOSIS — M48.061 SPINAL STENOSIS OF LUMBAR REGION WITHOUT NEUROGENIC CLAUDICATION: Primary | ICD-10-CM

## 2022-01-11 PROCEDURE — 99213 OFFICE O/P EST LOW 20 MIN: CPT | Performed by: INTERNAL MEDICINE

## 2022-01-11 NOTE — PROGRESS NOTES
Subjective   Maricarmen Hayes is a 56 y.o. female. Patient is here today for   Chief Complaint   Patient presents with   • Results     MRI RESULTS          Vitals:    01/11/22 1551   BP: 121/79   Pulse: 73   Resp: 18   Temp: 97.4 °F (36.3 °C)   SpO2: 100%     Body mass index is 25.76 kg/m².    The following portions of the patient's history were reviewed and updated as appropriate: allergies, current medications, past family history, past medical history, past social history, past surgical history and problem list.    Past Medical History:   Diagnosis Date   • Abnormal Pap smear of cervix    • Anemia    • Fibrocystic breast    • Hyperlipidemia       No Known Allergies   Social History     Socioeconomic History   • Marital status:      Spouse name: Ralph   • Number of children: 2   • Years of education: 14   Tobacco Use   • Smoking status: Never Smoker   • Smokeless tobacco: Never Used   Substance and Sexual Activity   • Alcohol use: Yes     Comment: MONTHLY 1-2 DRINKS   • Drug use: No   • Sexual activity: Yes     Partners: Male     Birth control/protection: Surgical     Comment: HYSTERECTOMY  2015        Current Outpatient Medications:   •  Calcium Carbonate (Caltrate 600) 1500 (600 Ca) MG tablet, Take 600 mg by mouth Daily., Disp: , Rfl:   •  methylPREDNISolone (MEDROL) 4 MG dose pack, Take as directed on package instructions., Disp: 21 tablet, Rfl: 0  •  NON FORMULARY, HOLD PRIOR TO SURGERY Testosterone/Versabase cream, 2%, using pea sized amount 2 to 3 times weekly., Disp: , Rfl:   •  vitamin C (ASCORBIC ACID) 250 MG tablet, Take 250 mg by mouth Daily., Disp: , Rfl:   •  vitamin E 1000 UNIT capsule, Take 1,000 Units by mouth Daily., Disp: , Rfl:   •  Zinc 100 MG tablet, Take  by mouth., Disp: , Rfl:      Objective     History of Present Illness Maricarmen was seen in early November with complaints of severe back and right leg pain, numbness, and weakness.  She was given a Medrol Dosepak and an MRI was ordered.   Her symptoms improved and she is back to riding an exercise bike.  She denies any back or leg pain at present.  Review of Systems   Constitutional: Negative for activity change.   Respiratory: Negative.    Cardiovascular: Negative.    Musculoskeletal: Negative for back pain.   Neurological: Negative for numbness.       Physical Exam  Vitals reviewed.   Constitutional:       Appearance: Normal appearance.   Neurological:      Mental Status: She is alert.      Deep Tendon Reflexes:      Reflex Scores:       Patellar reflexes are 2+ on the right side and 2+ on the left side.       Achilles reflexes are 1+ on the right side and 1+ on the left side.     Comments: Left quadricep strength is 3 out of 4.  Right quadricep strength is 4 out of 4.         ASSESSMENT discussed results of MRI of the lumbosacral spine at length.  There is severe lumbar spinal stenosis and 8 mm anterolisthesis at L4-L5 due to facet hypertrophy, osteophyte, disc degeneration.  Will refer to spine surgery.     Problems Addressed this Visit        Neuro    Spinal stenosis of lumbar region without neurogenic claudication - Primary      Diagnoses       Codes Comments    Spinal stenosis of lumbar region without neurogenic claudication    -  Primary ICD-10-CM: M48.061  ICD-9-CM: 724.02           PLAN  There are no Patient Instructions on file for this visit.  No follow-ups on file.

## 2022-03-25 ENCOUNTER — OFFICE VISIT (OUTPATIENT)
Dept: OBSTETRICS AND GYNECOLOGY | Age: 57
End: 2022-03-25

## 2022-03-25 VITALS
WEIGHT: 142 LBS | HEIGHT: 62 IN | BODY MASS INDEX: 26.13 KG/M2 | SYSTOLIC BLOOD PRESSURE: 128 MMHG | DIASTOLIC BLOOD PRESSURE: 82 MMHG

## 2022-03-25 DIAGNOSIS — Z13.89 SCREENING FOR BLOOD OR PROTEIN IN URINE: ICD-10-CM

## 2022-03-25 DIAGNOSIS — Z01.419 WELL FEMALE EXAM WITH ROUTINE GYNECOLOGICAL EXAM: Primary | ICD-10-CM

## 2022-03-25 DIAGNOSIS — Z12.31 SCREENING MAMMOGRAM, ENCOUNTER FOR: ICD-10-CM

## 2022-03-25 PROBLEM — Z96.611 S/P REVERSE TOTAL SHOULDER ARTHROPLASTY, RIGHT: Status: RESOLVED | Noted: 2020-02-24 | Resolved: 2022-03-25

## 2022-03-25 LAB
BILIRUB BLD-MCNC: NEGATIVE MG/DL
CLARITY, POC: CLEAR
COLOR UR: YELLOW
GLUCOSE UR STRIP-MCNC: NEGATIVE MG/DL
KETONES UR QL: NEGATIVE
LEUKOCYTE EST, POC: ABNORMAL
NITRITE UR-MCNC: NEGATIVE MG/ML
PH UR: 7 [PH] (ref 5–8)
PROT UR STRIP-MCNC: NEGATIVE MG/DL
RBC # UR STRIP: NEGATIVE /UL
SP GR UR: 1.01 (ref 1–1.03)
UROBILINOGEN UR QL: NORMAL

## 2022-03-25 PROCEDURE — 81002 URINALYSIS NONAUTO W/O SCOPE: CPT | Performed by: OBSTETRICS & GYNECOLOGY

## 2022-03-25 PROCEDURE — 99396 PREV VISIT EST AGE 40-64: CPT | Performed by: OBSTETRICS & GYNECOLOGY

## 2022-03-25 NOTE — PROGRESS NOTES
[facility]   Obstetrics and Gynecology       3/25/2022    Patient: Maricarmen Hayes          MR#:[id number]    History of Present Illness    Chief Complaint   Patient presents with   • Gynecologic Exam     last pap 2020 neg, last mg 2021       57 y.o. female  who presents for annual exam.    The patient presents for her regular exam feeling well without complaints    Studies reviewed:    No LMP recorded (lmp unknown). Patient has had a hysterectomy.  Obstetric History:  OB History        3    Para   2    Term   2            AB   1    Living   2       SAB        IAB        Ectopic   1    Molar        Multiple        Live Births   2               Menstrual History:     No LMP recorded (lmp unknown). Patient has had a hysterectomy.       Sexual History:       ________________________________________  Patient Active Problem List   Diagnosis   • Hyperlipidemia   • Elevated fasting blood sugar   • Elevated blood pressure reading in office without diagnosis of hypertension   • Acute pain of right shoulder   • Osteoarthritis of right shoulder   • Decreased libido   • Numbness of left foot   • Hip pain, chronic, right   • Chronic right-sided low back pain with sciatica   • Spinal stenosis of lumbar region without neurogenic claudication     Past Medical History:   Diagnosis Date   • Abnormal Pap smear of cervix    • Anemia    • Fibrocystic breast    • Hyperlipidemia    • S/P reverse total shoulder arthroplasty, right 2020     Past Surgical History:   Procedure Laterality Date   • BILATERAL SALPINGO OOPHORECTOMY  2015    Dr. Reynolds   • BLEPHAROPLASTY Bilateral 2019    Procedure: BLEPHAROPLASTY;  Surgeon: Kye Guerrero MD;  Location: [facility];  Service: Plastics   • BROW LIFT AND BLEPHAROPLASTY Bilateral 2019    Procedure: BROW LIFT AND  BILATERAL UPPER LID BLEPHAROPLASTY;  Surgeon: Kye Guerrero MD;  Location: [facility] OR;  Service: Plastics    •  SECTION     • COLONOSCOPY N/A 2016    Procedure: COLONOSCOPY;  Surgeon: Devon Campos MD;  Location:  [facility];  Service:    • OOPHORECTOMY     • SUPRACERVICAL HYSTERECTOMY SALPINGO OOPHORECTOMY  2015    supracervical hyst/ [facility]    • TOTAL SHOULDER ARTHROPLASTY W/ DISTAL CLAVICLE EXCISION Right 2020    Procedure: RIGHT TOTAL SHOULDER REVERSE ARTHROPLASTY WITH DISTAL CLAVICLE EXCISION;  Surgeon: Humberto pU MD;  Location:  [facility];  Service: Orthopedics;  Laterality: Right;   • TUBAL ABDOMINAL LIGATION       Social History     Tobacco Use   Smoking Status Never Smoker   Smokeless Tobacco Never Used     Family History   Problem Relation Age of Onset   • Rheum arthritis Maternal Aunt    • Hyperlipidemia Maternal Aunt    • Ovarian cancer Maternal Aunt    • Alzheimer's disease Paternal Grandmother    • Hyperlipidemia Maternal Grandmother    • Hyperlipidemia Mother    • Ovarian cancer Mother    • Rheum arthritis Mother    • Hyperlipidemia Maternal Aunt    • Ovarian cancer Maternal Aunt    • Arthritis Father    • Malig Hyperthermia Neg Hx      Prior to Admission medications    Medication Sig Start Date End Date Taking? Authorizing Provider   Calcium Carbonate 1500 (600 Ca) MG tablet Take 600 mg by mouth Daily.   Yes Shante Collazo MD   vitamin C (ASCORBIC ACID) 250 MG tablet Take 250 mg by mouth Daily.   Yes Shante Collazo MD   vitamin E 1000 UNIT capsule Take 1,000 Units by mouth Daily.   Yes Shante Collazo MD   Zinc 100 MG tablet Take  by mouth.   Yes Shante Collazo MD   methylPREDNISolone (MEDROL) 4 MG dose pack Take as directed on package instructions. 21   Yordy Mishra MD   NON FORMULARY HOLD PRIOR TO SURGERY  Testosterone/Versabase cream, 2%, using pea sized amount 2 to 3 times weekly.    Shante Collazo MD     ________________________________________    Current contraception: status post  "hysterectomy  History of abnormal Pap smear: no  Family history of uterine or ovarian cancer: no  Family History of colon cancer/colon polyps: no  History of abnormal mammogram: no  History of abnormal lipids: yes - not treated    The following portions of the patient's history were reviewed and updated as appropriate: allergies, current medications, past family history, past medical history, past social history, past surgical history and problem list.    Review of Systems    Pertinent items are noted in HPI.       Objective   Physical Exam    /82   Ht 157.5 cm (62\")   Wt 64.4 kg (142 lb)   LMP  (LMP Unknown)   Breastfeeding No   BMI 25.97 kg/m²    BP Readings from Last 3 Encounters:   03/25/22 128/82   01/11/22 121/79   11/02/21 120/90      Wt Readings from Last 3 Encounters:   03/25/22 64.4 kg (142 lb)   01/11/22 66 kg (145 lb 6.4 oz)   11/02/21 63.9 kg (140 lb 12.8 oz)        BMI: Estimated body mass index is 25.97 kg/m² as calculated from the following:    Height as of this encounter: 157.5 cm (62\").    Weight as of this encounter: 64.4 kg (142 lb).       General: alert, appears stated age and cooperative   Heart: regular rate and rhythm, S1, S2 normal, no murmur, click, rub or gallop   Lungs: clear to auscultation bilaterally   Abdomen: soft, non-tender, without masses, no organomegaly   Breast: inspection negative, no nipple discharge or bleeding, no masses or nodularity palpable   External genitalia/Vulva: External genitalia including bartholin's glands, Urethra, Kingfisher's gland and urethra meatus are normal, Perineum, rectum and anus appear normal  and Bladder appears normal without significant prolapse    Vagina: normal mucosa, normal discharge   Cervix: absent   Uterus: absent    Adnexa: normal adnexa     As part of wellness and prevention, the following topics were discussed with the patient:  Encouraged self breast exam  Physical activity and regular exercised encouraged. "           Assessment:  Diagnoses and all orders for this visit:    1. Well female exam with routine gynecological exam (Primary)    2. Screening for blood or protein in urine  -     POC Urinalysis Dipstick  -     POC Urinalysis Dipstick    3. Screening mammogram, encounter for  -     Mammo Screening Digital Tomosynthesis Bilateral With CAD; Future        Plan:  Return in about 1 year (around 3/25/2023) for Annual GYN exam.    Vern Reynolds MD  3/25/2022 10:28 EDT

## 2022-04-05 ENCOUNTER — OFFICE VISIT (OUTPATIENT)
Dept: ORTHOPEDIC SURGERY | Facility: CLINIC | Age: 57
End: 2022-04-05

## 2022-04-05 VITALS — WEIGHT: 145 LBS | HEIGHT: 63 IN | BODY MASS INDEX: 25.69 KG/M2

## 2022-04-05 DIAGNOSIS — M48.062 SPINAL STENOSIS OF LUMBAR REGION WITH NEUROGENIC CLAUDICATION: ICD-10-CM

## 2022-04-05 DIAGNOSIS — M43.10 RETROLISTHESIS OF VERTEBRAE: ICD-10-CM

## 2022-04-05 DIAGNOSIS — M43.16 SPONDYLOLISTHESIS OF LUMBAR REGION: Primary | ICD-10-CM

## 2022-04-05 DIAGNOSIS — M41.9 ACQUIRED SCOLIOSIS: ICD-10-CM

## 2022-04-05 PROCEDURE — 99204 OFFICE O/P NEW MOD 45 MIN: CPT | Performed by: ORTHOPAEDIC SURGERY

## 2022-04-05 NOTE — PROGRESS NOTES
New patient or new problem visit    CC: Low back pain, left buttock and leg pain    HPI: Presently she has minimal symptoms other than occasional sciatica but has a long history of back and leg pain which started about a year ago following a fall.  It improved over time with a Medrol pack.  No balance difficulties bowel or bladder complaints although at one time she had left buttock pain which radiated to the left lower extremity and exceeded the back pain in intensity.  No fever chills or weight loss.  She has been doing better for months but wanted to keep the appointment because of the concern for the radiographic findings.  PFSH: See attached    ROS: See attached    PE: BMI 25.7.  Posture is unremarkable good strength in the legs bilaterally in all tested groups.  Reflexes are brisk and symmetrical.    XRAY: Plain film x-rays of the lumbar spine from [facility] were reviewed and show grade 1 spondylolisthesis of about 8 mm at L4-5 and retrolisthesis at 3 4 and 2 3.  A well-balanced scoliosis is noted and overall there is good lordotic posture to the spine.  MRI scan shows severe stenosis at L4-5 and moderate changes at L3-4 and mild to moderate at L2-3.  I reviewed the radiologist reports on both of these studies and basically agree with him.    Other: n/a    Impression: Lumbar spondylolisthesis, lumbar scoliosis, lumbar retrolisthesis, lumbar spinal stenosis.  Especially severe stenosis at L4-5 where the spondylolisthetic level lies.    Plan: Right now she is nearly asymptomatic.  I recommended continued cardiovascular exercise in the form of bicycle riding and follow-up as needed.  I do not think physical therapy is going to add much at this time and as she is less symptomatic it may simply serve to stir up symptoms.  She will make a follow-up appointment for a year but if she is still doing great she can call to cancel it but I encouraged her to call meantime for any worsening in the way of increasing pain,  weakness, bowel or bladder complaints balance difficulties etc.  I did tell her that as she is relatively young and healthy she is likely to have further problems from this but that for now we should not do anything preventative, at least in an invasive fashion.

## 2022-04-06 ENCOUNTER — PATIENT ROUNDING (BHMG ONLY) (OUTPATIENT)
Dept: ORTHOPEDIC SURGERY | Facility: CLINIC | Age: 57
End: 2022-04-06

## 2022-04-06 NOTE — PROGRESS NOTES
A [employer] Message has been sent to the patient for PATIENT ROUNDING with [facility]

## 2022-04-18 ENCOUNTER — HOSPITAL ENCOUNTER (OUTPATIENT)
Dept: MAMMOGRAPHY | Facility: HOSPITAL | Age: 57
Discharge: HOME | End: 2022-04-18
Admitting: OBSTETRICS & GYNECOLOGY

## 2022-04-18 DIAGNOSIS — Z12.31 SCREENING MAMMOGRAM, ENCOUNTER FOR: ICD-10-CM

## 2022-04-18 PROCEDURE — 77063 BREAST TOMOSYNTHESIS BI: CPT

## 2022-04-18 PROCEDURE — 77067 SCR MAMMO BI INCL CAD: CPT

## 2023-03-02 ENCOUNTER — TRANSCRIBE ORDERS (OUTPATIENT)
Dept: ADMINISTRATIVE | Facility: HOSPITAL | Age: 58
End: 2023-03-02
Payer: COMMERCIAL

## 2023-03-02 DIAGNOSIS — Z12.31 SCREENING MAMMOGRAM, ENCOUNTER FOR: Primary | ICD-10-CM

## 2023-03-31 ENCOUNTER — OFFICE VISIT (OUTPATIENT)
Dept: OBSTETRICS AND GYNECOLOGY | Age: 58
End: 2023-03-31
Payer: COMMERCIAL

## 2023-03-31 VITALS
BODY MASS INDEX: 25.34 KG/M2 | DIASTOLIC BLOOD PRESSURE: 74 MMHG | SYSTOLIC BLOOD PRESSURE: 130 MMHG | WEIGHT: 143 LBS | HEIGHT: 63 IN

## 2023-03-31 DIAGNOSIS — Z13.89 SCREENING FOR BLOOD OR PROTEIN IN URINE: ICD-10-CM

## 2023-03-31 DIAGNOSIS — Z78.0 MENOPAUSE: ICD-10-CM

## 2023-03-31 DIAGNOSIS — M85.80 OSTEOPENIA, SENILE: ICD-10-CM

## 2023-03-31 DIAGNOSIS — Z01.419 WELL FEMALE EXAM WITH ROUTINE GYNECOLOGICAL EXAM: Primary | ICD-10-CM

## 2023-03-31 LAB
BILIRUB BLD-MCNC: NEGATIVE MG/DL
CLARITY, POC: CLEAR
COLOR UR: YELLOW
GLUCOSE UR STRIP-MCNC: NEGATIVE MG/DL
KETONES UR QL: NEGATIVE
LEUKOCYTE EST, POC: NEGATIVE
NITRITE UR-MCNC: NEGATIVE MG/ML
PH UR: 6 [PH] (ref 5–8)
PROT UR STRIP-MCNC: NEGATIVE MG/DL
RBC # UR STRIP: NEGATIVE /UL
SP GR UR: 1.01 (ref 1–1.03)
UROBILINOGEN UR QL: NORMAL

## 2023-03-31 PROCEDURE — 99396 PREV VISIT EST AGE 40-64: CPT | Performed by: OBSTETRICS & GYNECOLOGY

## 2023-03-31 PROCEDURE — 81002 URINALYSIS NONAUTO W/O SCOPE: CPT | Performed by: OBSTETRICS & GYNECOLOGY

## 2023-03-31 NOTE — PROGRESS NOTES
[facility]   Obstetrics and Gynecology     3/31/2023    Patient: Maricarmen Hayes          MR#:[id number]    History of Present Illness    Chief Complaint   Patient presents with   • Gynecologic Exam     CC: Annual. last pap 20, HPV neg, last mammo 3/25/22, colonos 2016       58 y.o. female  who presents for annual exam.    The patient presents for her regular exam feeling well without complaints    Studies reviewed:    No LMP recorded (lmp unknown). Patient has had a hysterectomy.  Obstetric History:  OB History        3    Para   2    Term   2            AB   1    Living   2       SAB        IAB        Ectopic   1    Molar        Multiple        Live Births   2               Menstrual History:     No LMP recorded (lmp unknown). Patient has had a hysterectomy.       Sexual History:       Social History     Substance and Sexual Activity   Sexual Activity Yes   • Partners: Male   • Birth control/protection: Surgical    Comment: HYSTERECTOMY       ______________________________________  Patient Active Problem List   Diagnosis   • Hyperlipidemia   • Elevated fasting blood sugar   • Elevated blood pressure reading in office without diagnosis of hypertension   • Acute pain of right shoulder   • Osteoarthritis of right shoulder   • Decreased libido   • Numbness of left foot   • Hip pain, chronic, right   • Chronic right-sided low back pain with sciatica   • Spinal stenosis of lumbar region without neurogenic claudication     Past Medical History:   Diagnosis Date   • Abnormal Pap smear of cervix    • Anemia    • Fibrocystic breast    • Hyperlipidemia    • S/P reverse total shoulder arthroplasty, right 2020     Past Surgical History:   Procedure Laterality Date   • BILATERAL SALPINGO OOPHORECTOMY  2015    Dr. Reynolds   • BLEPHAROPLASTY Bilateral 2019    Procedure: BLEPHAROPLASTY;  Surgeon: Kye Guerrero MD;  Location: [facility] OR;  Service: Plastics   •  BROW LIFT AND BLEPHAROPLASTY Bilateral 2019    Procedure: BROW LIFT AND  BILATERAL UPPER LID BLEPHAROPLASTY;  Surgeon: Kye Guerrero MD;  Location: [facility] MAIN OR;  Service: Plastics   •  SECTION     • COLONOSCOPY N/A 2016    Procedure: COLONOSCOPY;  Surgeon: Devon Campos MD;  Location: [facility] ENDOSCOPY;  Service:    • OOPHORECTOMY     • SUPRACERVICAL HYSTERECTOMY SALPINGO OOPHORECTOMY  2015    supracervical hyst/ [facility]    • TOTAL SHOULDER ARTHROPLASTY W/ DISTAL CLAVICLE EXCISION Right 2020    Procedure: RIGHT TOTAL SHOULDER REVERSE ARTHROPLASTY WITH DISTAL CLAVICLE EXCISION;  Surgeon: Humberto Up MD;  [place]  [facility] [place] [facility] [place]  Service: Orthopedics;  Laterality: Right;   • TUBAL ABDOMINAL LIGATION       Social History     Tobacco Use   Smoking Status Never   Smokeless Tobacco Never     Family History   Problem Relation Age of Onset   • Hyperlipidemia Mother    • Ovarian cancer Mother    • Rheum arthritis Mother    • Arthritis Father    • Hyperlipidemia Maternal Grandmother    • Alzheimer's disease Paternal Grandmother    • Rheum arthritis Maternal Aunt    • Hyperlipidemia Maternal Aunt    • Ovarian cancer Maternal Aunt    • Hyperlipidemia Maternal Aunt    • Ovarian cancer Maternal Aunt    • Malig Hyperthermia Neg Hx    • Breast cancer Neg Hx      Prior to Admission medications    Medication Sig Start Date End Date Taking? Authorizing Provider   Calcium Carbonate 1500 (600 Ca) MG tablet Take 1 tablet by mouth Daily.   Yes Shante Collazo MD   vitamin C (ASCORBIC ACID) 250 MG tablet Take 1 tablet by mouth Daily.   Yes Shante Collazo MD   vitamin E 1000 UNIT capsule Take 1 capsule by mouth Daily.   Yes Shante Collazo MD   Zinc 100 MG tablet Take  by mouth.   Yes Shante Collazo MD     _______________________________________    Current contraception: status post hysterectomy  History of abnormal Pap smear: no  Family history of  "uterine or ovarian cancer: no  Family History of colon cancer/colon polyps: no  History of abnormal mammogram: no  History of abnormal lipids: no    The following portions of the patient's history were reviewed and updated as appropriate: allergies, current medications, past family history, past medical history, past social history, past surgical history and problem list.    Review of Systems    Pertinent items are noted in HPI.       Objective   Physical Exam    /74   Ht 160 cm (63\")   Wt 64.9 kg (143 lb)   LMP  (LMP Unknown)   BMI 25.33 kg/m²    BP Readings from Last 3 Encounters:   03/31/23 130/74   03/25/22 128/82   01/11/22 121/79      Wt Readings from Last 3 Encounters:   03/31/23 64.9 kg (143 lb)   04/05/22 65.8 kg (145 lb)   03/25/22 64.4 kg (142 lb)        BMI: Estimated body mass index is 25.33 kg/m² as calculated from the following:    Height as of this encounter: 160 cm (63\").    Weight as of this encounter: 64.9 kg (143 lb).       General: alert, appears stated age and cooperative   Heart: regular rate and rhythm, S1, S2 normal, no murmur, click, rub or gallop   Lungs: clear to auscultation bilaterally   Abdomen: soft, non-tender, without masses, no organomegaly   Breast: inspection negative, no nipple discharge or bleeding, no masses or nodularity palpable   External genitalia/Vulva: moderate atrophy, External genitalia including bartholin's glands, Urethra, [place]'s gland and urethra meatus are normal, Perineum, rectum and anus appear normal  and Bladder appears normal without significant prolapse    Vagina: normal mucosa, normal discharge   Cervix: no lesions   Uterus: absent    Adnexa: normal adnexa     As part of wellness and prevention, the following topics were discussed with the patient:  Encouraged self breast exam  Physical activity and regular exercised encouraged.               Assessment:  Diagnoses and all orders for this visit:    1. Well female exam with routine gynecological " exam (Primary)    2. Screening for blood or protein in urine  -     POC Urinalysis Dipstick    3. Menopause  -     dexa bone density axial; Future    4. Osteopenia, senile  -     dexa bone density axial; Future      Plan:  No follow-ups on file.    Vern Reynolds MD  3/31/2023 11:05 EDT

## 2023-04-05 ENCOUNTER — OFFICE VISIT (OUTPATIENT)
Dept: ORTHOPEDIC SURGERY | Facility: CLINIC | Age: 58
End: 2023-04-05
Payer: COMMERCIAL

## 2023-04-05 VITALS — HEIGHT: 63 IN | BODY MASS INDEX: 25.52 KG/M2 | WEIGHT: 144 LBS | TEMPERATURE: 97.6 F

## 2023-04-05 DIAGNOSIS — R52 PAIN: ICD-10-CM

## 2023-04-05 DIAGNOSIS — M48.061 SPINAL STENOSIS OF LUMBAR REGION WITHOUT NEUROGENIC CLAUDICATION: Primary | ICD-10-CM

## 2023-04-05 PROCEDURE — 99213 OFFICE O/P EST LOW 20 MIN: CPT | Performed by: NURSE PRACTITIONER

## 2023-04-05 PROCEDURE — 72100 X-RAY EXAM L-S SPINE 2/3 VWS: CPT | Performed by: NURSE PRACTITIONER

## 2023-04-05 NOTE — PROGRESS NOTES
Patient Name: Maricarmen Hayes   YOB: 1965  Referring Primary Care Physician: Yordy Mishra MD      Chief Complaint:    Chief Complaint   Patient presents with   • Lumbar Spine - Initial Evaluation, Pain        HPI:  Maricarmen Hayes is a 58 y.o. female who [facility].  She was last seen in the office by Dr. Gongora for 522 with complaints of low back pain referring to left  buttock and leg after a fall a year prior.  By the time she saw Dr. Gongora, she says her pain had pretty much resolved.  This is a planned 1 year follow-up with no new complaints. Prior pertinent records were reviewed.    PFSH:  See attached    ROS: As per HPI, otherwise negative    Objective:    Vitals:    04/05/23 1413   Temp: 97.6 °F (36.4 °C)     Body mass index is 25.51 kg/m².      Neurologic Exam     Mental Status   Oriented to person, place, and time.   Speech: speech is normal     Motor Exam   Muscle bulk: normal  Overall muscle tone: normal    Strength   Strength 5/5 except as noted.     Sensory Exam   Light touch normal.     Gait, Coordination, and Reflexes     Gait  Gait: normal    Reflexes   Right brachioradialis: 2+  Left brachioradialis: 2+  Right biceps: 2+  Left biceps: 2+  Right triceps: 2+  Left triceps: 2+  Right patellar: 2+  Left patellar: 2+  Right achilles: 2+  Left achilles: 2+     Ortho Exam  Physical Exam  Neurological:      Mental Status: She is oriented to person, place, and time.      Gait: Gait is intact.      Deep Tendon Reflexes:      Reflex Scores:       Tricep reflexes are 2+ on the right side and 2+ on the left side.       Bicep reflexes are 2+ on the right side and 2+ on the left side.       Brachioradialis reflexes are 2+ on the right side and 2+ on the left side.       Patellar reflexes are 2+ on the right side and 2+ on the left side.       Achilles reflexes are 2+ on the right side and 2+ on the left  side.  Psychiatric:         Speech: Speech normal.           IMAGING:     Indication: pain related symptoms,  Views: 2V AP&LAT lumbar  Findings: Reviewed and reveals retrolisthesis L2 on 3, L3 on L4 and anterolisthesis L4 on L5, mild scoliosis well-balanced, no change from 11/2/2021.  Comparison views: Prior lumbar MRI 12/3/2021 as discussed with Dr. Gongora previously    Assessment:           Diagnoses and all orders for this visit:    1. Spinal stenosis of lumbar region without neurogenic claudication (Primary)    2. Pain  -     XR Spine Lumbar AP & Lateral             Plan:  No change in overall symptoms since last visit.  No neurologic dysfunction on exam.  We discussed red flags today to include but not limited to saddle anesthesia, loss of bowel or bladder control or weakness in lower extremities as reasons to follow-up sooner but otherwise we will plan on follow-up in 1 year.  Offered to follow-up as needed, but she would like to go ahead and schedule an appointment in 1 year.  She was also encouraged to remain active as she does with a recumbent bike and weightbearing exercise.  Call in the meantime with any worsening symptoms or red flags.    Return in about 1 year (around 4/5/2024).

## 2023-06-13 ENCOUNTER — HOSPITAL ENCOUNTER (OUTPATIENT)
Dept: MAMMOGRAPHY | Facility: HOSPITAL | Age: 58
Discharge: HOME | End: 2023-06-13
Payer: COMMERCIAL

## 2023-06-13 ENCOUNTER — HOSPITAL ENCOUNTER (OUTPATIENT)
Dept: BONE DENSITY | Facility: HOSPITAL | Age: 58
Discharge: HOME | End: 2023-06-13
Payer: COMMERCIAL

## 2023-06-13 DIAGNOSIS — M85.80 OSTEOPENIA, SENILE: ICD-10-CM

## 2023-06-13 DIAGNOSIS — Z78.0 MENOPAUSE: ICD-10-CM

## 2023-06-13 DIAGNOSIS — Z12.31 SCREENING MAMMOGRAM, ENCOUNTER FOR: ICD-10-CM

## 2023-06-13 PROCEDURE — 77063 BREAST TOMOSYNTHESIS BI: CPT

## 2023-06-13 PROCEDURE — 77067 SCR MAMMO BI INCL CAD: CPT

## 2023-06-13 PROCEDURE — 77080 DXA BONE DENSITY AXIAL: CPT

## 2023-06-14 PROBLEM — R20.0 NUMBNESS OF LEFT FOOT: Status: RESOLVED | Noted: 2021-02-03 | Resolved: 2023-06-14

## 2023-06-14 PROBLEM — M85.80 OSTEOPENIA, SENILE: Status: ACTIVE | Noted: 2023-06-14

## 2023-06-26 ENCOUNTER — TELEPHONE (OUTPATIENT)
Dept: FAMILY MEDICINE CLINIC | Facility: CLINIC | Age: 58
End: 2023-06-26

## 2023-06-26 NOTE — TELEPHONE ENCOUNTER
Caller: Maricarmen Hayes    Relationship: Self    Best call back number: [phone]    What medication are you requesting:     What are your current symptoms: DIARRHEA    How long have you been experiencing symptoms: 1 DAY    Have you had these symptoms before:    [] Yes  [x] No    Have you been treated for these symptoms before:   [] Yes  [x] No    If a prescription is needed, what is your preferred pharmacy and phone number:  [address]  [phone]    Additional notes: PATIENT IS CALLING IN STATING THAT SHE IS GOING [place] OF [place] THIS [place]EEK AND NEEDS TO KNOW IF DR MONZON WILL CALL HER IN A ANTIDIARRHEA MEDICATION TO TAKE WITH HER.

## 2023-11-14 ENCOUNTER — OFFICE VISIT (OUTPATIENT)
Dept: FAMILY MEDICINE CLINIC | Facility: CLINIC | Age: 58
End: 2023-11-14
Payer: COMMERCIAL

## 2023-11-14 VITALS
HEIGHT: 63 IN | HEART RATE: 77 BPM | SYSTOLIC BLOOD PRESSURE: 118 MMHG | DIASTOLIC BLOOD PRESSURE: 80 MMHG | BODY MASS INDEX: 25.14 KG/M2 | OXYGEN SATURATION: 96 % | WEIGHT: 141.87 LBS

## 2023-11-14 DIAGNOSIS — Z00.00 ANNUAL PHYSICAL EXAM: Primary | ICD-10-CM

## 2023-11-14 DIAGNOSIS — M85.80 OSTEOPENIA, SENILE: ICD-10-CM

## 2023-11-14 DIAGNOSIS — E78.01 FAMILIAL HYPERCHOLESTEROLEMIA: ICD-10-CM

## 2023-11-14 LAB
ALBUMIN SERPL-MCNC: 4.9 G/DL (ref 3.5–5.2)
ALBUMIN/GLOB SERPL: 2.1 G/DL
ALP SERPL-CCNC: 105 U/L (ref 39–117)
ALT SERPL-CCNC: 72 U/L (ref 1–33)
AST SERPL-CCNC: 53 U/L (ref 1–32)
BASOPHILS # BLD AUTO: 0.04 10*3/MM3 (ref 0–0.2)
BASOPHILS NFR BLD AUTO: 1 % (ref 0–1.5)
BILIRUB SERPL-MCNC: 0.6 MG/DL (ref 0–1.2)
BUN SERPL-MCNC: 10 MG/DL (ref 6–20)
BUN/CREAT SERPL: 12.2 (ref 7–25)
CALCIUM SERPL-MCNC: 9.9 MG/DL (ref 8.6–10.5)
CHLORIDE SERPL-SCNC: 104 MMOL/L (ref 98–107)
CHOLEST SERPL-MCNC: 308 MG/DL (ref 0–200)
CO2 SERPL-SCNC: 31.8 MMOL/L (ref 22–29)
CREAT SERPL-MCNC: 0.82 MG/DL (ref 0.57–1)
EGFRCR SERPLBLD CKD-EPI 2021: 82.5 ML/MIN/1.73
EOSINOPHIL # BLD AUTO: 0.14 10*3/MM3 (ref 0–0.4)
EOSINOPHIL NFR BLD AUTO: 3.5 % (ref 0.3–6.2)
ERYTHROCYTE [DISTWIDTH] IN BLOOD BY AUTOMATED COUNT: 12.6 % (ref 12.3–15.4)
GLOBULIN SER CALC-MCNC: 2.3 GM/DL
GLUCOSE SERPL-MCNC: 102 MG/DL (ref 65–99)
HCT VFR BLD AUTO: 44.2 % (ref 34–46.6)
HDLC SERPL-MCNC: 60 MG/DL (ref 40–60)
HGB BLD-MCNC: 14.8 G/DL (ref 12–15.9)
IMM GRANULOCYTES # BLD AUTO: 0.01 10*3/MM3 (ref 0–0.05)
IMM GRANULOCYTES NFR BLD AUTO: 0.2 % (ref 0–0.5)
LDLC SERPL CALC-MCNC: 230 MG/DL (ref 0–100)
LDLC/HDLC SERPL: 3.79 {RATIO}
LYMPHOCYTES # BLD AUTO: 1.1 10*3/MM3 (ref 0.7–3.1)
LYMPHOCYTES NFR BLD AUTO: 27.2 % (ref 19.6–45.3)
MCH RBC QN AUTO: 30.1 PG (ref 26.6–33)
MCHC RBC AUTO-ENTMCNC: 33.5 G/DL (ref 31.5–35.7)
MCV RBC AUTO: 90 FL (ref 79–97)
MONOCYTES # BLD AUTO: 0.45 10*3/MM3 (ref 0.1–0.9)
MONOCYTES NFR BLD AUTO: 11.1 % (ref 5–12)
NEUTROPHILS # BLD AUTO: 2.31 10*3/MM3 (ref 1.7–7)
NEUTROPHILS NFR BLD AUTO: 57 % (ref 42.7–76)
NRBC BLD AUTO-RTO: 0 /100 WBC (ref 0–0.2)
PLATELET # BLD AUTO: 216 10*3/MM3 (ref 140–450)
POTASSIUM SERPL-SCNC: 4.5 MMOL/L (ref 3.5–5.2)
PROT SERPL-MCNC: 7.2 G/DL (ref 6–8.5)
RBC # BLD AUTO: 4.91 10*6/MM3 (ref 3.77–5.28)
SODIUM SERPL-SCNC: 142 MMOL/L (ref 136–145)
TRIGL SERPL-MCNC: 104 MG/DL (ref 0–150)
TSH SERPL DL<=0.005 MIU/L-ACNC: 3.14 UIU/ML (ref 0.27–4.2)
VLDLC SERPL CALC-MCNC: 18 MG/DL (ref 5–40)
WBC # BLD AUTO: 4.05 10*3/MM3 (ref 3.4–10.8)

## 2023-11-14 PROCEDURE — 99396 PREV VISIT EST AGE 40-64: CPT | Performed by: INTERNAL MEDICINE

## 2023-11-14 NOTE — PROGRESS NOTES
Subjective   Maricarmen Hayes is a 58 y.o. female. Patient is here today for   Chief Complaint   Patient presents with    Annual Exam          Vitals:    11/14/23 0935   BP: 118/80   Pulse: 77   SpO2: 96%     Body mass index is 25.14 kg/m².    The following portions of the patient's history were reviewed and updated as appropriate: allergies, current medications, past family history, past medical history, past social history, past surgical history and problem list.    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      No Known Allergies   Social History     Socioeconomic History    Marital status:      Spouse name: Ralph    Number of children: 2    Years of education: 14   Tobacco Use    Smoking status: Never    Smokeless tobacco: Never   Vaping Use    Vaping Use: Never used   Substance and Sexual Activity    Alcohol use: Yes     Comment: Social events 1 glass wine    Drug use: No    Sexual activity: Yes     Partners: Male     Birth control/protection: Surgical     Comment: HYSTERECTOMY  2015        Current Outpatient Medications:     Calcium Carbonate 1500 (600 Ca) MG tablet, Take 1 tablet by mouth Daily., Disp: , Rfl:     diphenoxylate-atropine (Lomotil) 2.5-0.025 MG per tablet, Take 1 tablet by mouth 4 (Four) Times a Day As Needed for Diarrhea., Disp: 20 tablet, Rfl: 0    vitamin C (ASCORBIC ACID) 250 MG tablet, Take 1 tablet by mouth Daily., Disp: , Rfl:     vitamin E 1000 UNIT capsule, Take 1 capsule by mouth Daily., Disp: , Rfl:     Zinc 100 MG tablet, Take  by mouth., Disp: , Rfl:      EKG NOT DONE    Objective   History of Present Illness Shawn is here for an annual physical examination.  She has been healthy.  She does have  elevation of LDL cholesterol, osteopenia, and lumbar spinal stenosis.  She sees her  gynecologist annually.  Mammograms and bone density test are up-to-date.  She had a coronary calcium CT and her score was 0.  Vascular screening test have been normal.  She did not have fasting lab work prior to this appointment.    Review of Systems   Constitutional:  Negative for activity change and unexpected weight change.   Eyes:         ANNUAL EXAM   Respiratory: Negative.     Cardiovascular: Negative.    Gastrointestinal: Negative.    Genitourinary: Negative.    Musculoskeletal:  Negative for back pain.   Neurological:  Negative for weakness and numbness.   Psychiatric/Behavioral: Negative.         Physical Exam  Vitals reviewed.   Constitutional:       Appearance: Normal appearance.   HENT:      Right Ear: Tympanic membrane normal.      Left Ear: Tympanic membrane normal.      Mouth/Throat:      Mouth: Mucous membranes are moist.      Pharynx: Oropharynx is clear.   Eyes:      Extraocular Movements: Extraocular movements intact.      Pupils: Pupils are equal, round, and reactive to light.   Neck:      Vascular: No carotid bruit.   Cardiovascular:      Rate and Rhythm: Normal rate and regular rhythm.      Pulses: Normal pulses.      Heart sounds: Normal heart sounds.      Comments: 114/80  Pulmonary:      Effort: Pulmonary effort is normal.      Breath sounds: Normal breath sounds.   Musculoskeletal:      Right lower leg: No edema.      Left lower leg: No edema.   Lymphadenopathy:      Cervical: No cervical adenopathy.   Neurological:      Mental Status: She is alert.   Psychiatric:         Mood and Affect: Mood normal.         Behavior: Behavior normal.         Thought Content: Thought content normal.         Judgment: Judgment normal.         Assessment blood pressure remains normal.  Discussed taking a vitamin D supplement.  We will check fasting lab work today.  Discussed appropriate immunizations.  ASSESSMENT    Problems Addressed this Visit          Cardiac and Vasculature    [facility]       Health  Encounters    Annual physical exam - Primary    Relevant Orders    Comprehensive Metabolic Panel    Lipid Panel With LDL / HDL Ratio    CBC & Differential    TSH       Musculoskeletal and Injuries    Osteopenia, senile     Diagnoses         Codes Comments    Annual physical exam    -  Primary ICD-10-CM: Z00.00  ICD-9-CM: V70.0     Familial hypercholesterolemia     ICD-10-CM: E78.01  ICD-9-CM: 272.0     Osteopenia, senile     ICD-10-CM: M85.80  ICD-9-CM: 733.90             PLAN  There are no Patient Instructions on file for this visit.    No follow-ups on file.

## 2023-11-16 ENCOUNTER — TRANSCRIBE ORDERS (OUTPATIENT)
Dept: FAMILY MEDICINE CLINIC | Facility: CLINIC | Age: 58
End: 2023-11-16
Payer: COMMERCIAL

## 2023-11-16 ENCOUNTER — TELEPHONE (OUTPATIENT)
Dept: FAMILY MEDICINE CLINIC | Facility: CLINIC | Age: 58
End: 2023-11-16
Payer: COMMERCIAL

## 2023-11-16 DIAGNOSIS — Z13.6 ENCOUNTER FOR SCREENING FOR VASCULAR DISEASE: Primary | ICD-10-CM

## 2023-11-16 RX ORDER — ROSUVASTATIN CALCIUM 10 MG/1
10 TABLET, COATED ORAL DAILY
Qty: 90 TABLET | Refills: 3 | Status: SHIPPED | OUTPATIENT
Start: 2023-11-16

## 2023-11-16 NOTE — TELEPHONE ENCOUNTER
Discussed lab results.  LDL cholesterol is very high and liver transaminase enzymes are mildly elevated.  Will start rosuvastatin 10 mg daily and follow-up in 3 months with a comprehensive metabolic panel and lipid panel.

## 2023-11-21 ENCOUNTER — TELEPHONE (OUTPATIENT)
Dept: FAMILY MEDICINE CLINIC | Facility: CLINIC | Age: 58
End: 2023-11-21

## 2023-11-21 DIAGNOSIS — H91.93 BILATERAL HEARING LOSS, UNSPECIFIED HEARING LOSS TYPE: Primary | ICD-10-CM

## 2023-11-21 NOTE — TELEPHONE ENCOUNTER
Caller: Maricarmen Hayes    Relationship: Self    Best call back number: [phone]     What is the medical concern/diagnosis: HEARING ISSUES     What specialty or service is being requested: HEARING TEST

## 2024-02-06 ENCOUNTER — HOSPITAL ENCOUNTER (OUTPATIENT)
Dept: CARDIOLOGY | Facility: HOSPITAL | Age: 59
Discharge: HOME | End: 2024-02-06
Admitting: INTERNAL MEDICINE

## 2024-02-06 VITALS
HEART RATE: 68 BPM | SYSTOLIC BLOOD PRESSURE: 129 MMHG | WEIGHT: 142 LBS | HEIGHT: 62 IN | DIASTOLIC BLOOD PRESSURE: 84 MMHG | BODY MASS INDEX: 26.13 KG/M2

## 2024-02-06 DIAGNOSIS — Z13.6 ENCOUNTER FOR SCREENING FOR VASCULAR DISEASE: ICD-10-CM

## 2024-02-06 LAB
BH CV ECHO MEAS - DIST AO DIAM: 1.47 CM
BH CV VAS BP LEFT ARM: NORMAL MMHG
BH CV VAS BP RIGHT ARM: NORMAL MMHG
BH CV VAS SCREENING CAROTID CCA LEFT: NORMAL CM/SEC
BH CV VAS SCREENING CAROTID CCA RIGHT: NORMAL CM/SEC
BH CV VAS SCREENING CAROTID ICA LEFT: NORMAL CM/SEC
BH CV VAS SCREENING CAROTID ICA RIGHT: NORMAL CM/SEC
BH CV XLRA MEAS - MID AO DIAM: 1.73 CM
BH CV XLRA MEAS - PAD LEFT ABI DP: 1.2
BH CV XLRA MEAS - PAD LEFT ABI PT: 1.31
BH CV XLRA MEAS - PAD LEFT ARM: 129 MMHG
BH CV XLRA MEAS - PAD LEFT LEG DP: 156 MMHG
BH CV XLRA MEAS - PAD LEFT LEG PT: 170 MMHG
BH CV XLRA MEAS - PAD RIGHT ABI DP: 1.2
BH CV XLRA MEAS - PAD RIGHT ABI PT: 1.29
BH CV XLRA MEAS - PAD RIGHT ARM: 129 MMHG
BH CV XLRA MEAS - PAD RIGHT LEG DP: 155 MMHG
BH CV XLRA MEAS - PAD RIGHT LEG PT: 167 MMHG
BH CV XLRA MEAS - PROX AO DIAM: 2.14 CM
BH CV XLRA MEAS LEFT ICA/CCA RATIO: 0.81
BH CV XLRA MEAS LEFT PROX ECA PSV: NORMAL CM/SEC
BH CV XLRA MEAS RIGHT ICA/CCA RATIO: 1.25
BH CV XLRA MEAS RIGHT PROX ECA PSV: NORMAL CM/SEC
MAXIMAL PREDICTED HEART RATE: 160 BPM
STRESS TARGET HR: 136 BPM

## 2024-02-06 PROCEDURE — 93799 UNLISTED CV SVC/PROCEDURE: CPT

## 2024-02-14 NOTE — TELEPHONE ENCOUNTER
CALLED PT AND SCHEDULED APPT AS REQUESTED   Patient with normal affect.  Height is  1.6 m (5' 3\"), weight is 93 kg (205 lb), Body mass index is 36.31 kg/m².  Resting respiratory rate is 16.   The patient walks with a mild limp in a boot using a cane. The incision healed well.  No signs of any erythema or drainage, minimal swelling. She has no pain with the active or passive range of motion of the left ankle, significant decrease ROM.  She has intact sensation distally, and she is neurovascularly intact.    IMAGING:  Three views left ankle taken today in the office showed anatomic alignment of the fracture, medial and lateral plates and screws in good position, no loosening. Ankle mortise is well centered. Syndesmosis screws intact x2.     IMPRESSION:     1-Left ankle trimalleolar displaced comminuted fracture, status post ORIF.  2-Left distal tibial shaft fracture, s/p ORIF  3-Left ankle distal tibiofibular syndesmosis disruption, s/p ORIF syndesmosis screws x2      PLAN: She will be WBAT out of the boot, and start aggressive ROM and peroneal strengthening exercise. I discussed with the patient that I think that she would really benefit from continue physical therapy for further strengthening and stretching. NSAIDs OTC.  The patient will come back for a follow up in 4 weeks.  At that time, we will take 3 views of the left ankle standing. She will likely need a staged procedure for syndesmosis screw.      Samantha Reed MD

## 2024-03-08 ENCOUNTER — OFFICE VISIT (OUTPATIENT)
Dept: FAMILY MEDICINE CLINIC | Facility: CLINIC | Age: 59
End: 2024-03-08
Payer: COMMERCIAL

## 2024-03-08 VITALS
HEIGHT: 62 IN | BODY MASS INDEX: 26.5 KG/M2 | WEIGHT: 144 LBS | RESPIRATION RATE: 16 BRPM | TEMPERATURE: 98.7 F | OXYGEN SATURATION: 97 % | SYSTOLIC BLOOD PRESSURE: 110 MMHG | DIASTOLIC BLOOD PRESSURE: 76 MMHG | HEART RATE: 86 BPM

## 2024-03-08 DIAGNOSIS — E78.01 FAMILIAL HYPERCHOLESTEROLEMIA: Primary | ICD-10-CM

## 2024-03-08 PROCEDURE — 99213 OFFICE O/P EST LOW 20 MIN: CPT | Performed by: INTERNAL MEDICINE

## 2024-03-08 NOTE — PROGRESS NOTES
Subjective   Maricarmen Hayes is a 59 y.o. female. Patient is here today for   Chief Complaint   Patient presents with    Follow-up     3 month f/u, no concerns today          Vitals:    03/08/24 1532   BP: 110/76   Pulse: 86   Resp: 16   Temp: 98.7 °F (37.1 °C)   SpO2: 97%     Body mass index is 26.33 kg/m².      The following portions of the patient's history were reviewed and updated as appropriate: allergies, current medications, past family history, past medical history, past social history, past surgical history and problem list.    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      No Known Allergies   Social History     Socioeconomic History    Marital status:      Spouse name: Ralph    Number of children: 2    Years of education: 14   Tobacco Use    Smoking status: Never     Passive exposure: Never    Smokeless tobacco: Never   Vaping Use    Vaping status: Never Used   Substance and Sexual Activity    Alcohol use: Yes     Comment: Social events 1 glass wine    Drug use: No    Sexual activity: Yes     Partners: Male     Birth control/protection: Surgical     Comment: HYSTERECTOMY  2015        Current Outpatient Medications:     Calcium Carbonate 1500 (600 Ca) MG tablet, Take 1 tablet by mouth Daily., Disp: , Rfl:     rosuvastatin (Crestor) 10 MG tablet, Take 1 tablet by mouth Daily., Disp: 90 tablet, Rfl: 3    vitamin C (ASCORBIC ACID) 250 MG tablet, Take 1 tablet by mouth Daily., Disp: , Rfl:     vitamin E 1000 UNIT capsule, Take 1 capsule by mouth Daily., Disp: , Rfl:     Zinc 100 MG tablet, Take  by mouth., Disp: , Rfl:      Objective   History of Present Illness Donya is here for blood pressure check and a lab follow-up however she did not have any lab work prior to this appointment.  She has familial  hyperlipidemia and was started on rosuvastatin 10 mg daily 3 months ago.  She eats healthy and continues to exercise.  Her weight is unchanged in the last year.  She recently had normal vascular screening test.    Review of Systems   Constitutional:  Negative for activity change and unexpected weight change.   Respiratory: Negative.     Cardiovascular: Negative.    Neurological: Negative.    Psychiatric/Behavioral: Negative.         Physical Exam  Vitals reviewed.   Constitutional:       Appearance: Normal appearance.   Cardiovascular:      Rate and Rhythm: Normal rate and regular rhythm.      Heart sounds: Normal heart sounds.      Comments: 100/70  Pulmonary:      Effort: Pulmonary effort is normal.      Breath sounds: Normal breath sounds.   Neurological:      Mental Status: She is alert.   Psychiatric:         Mood and Affect: Mood normal.         Behavior: Behavior normal.         Thought Content: Thought content normal.         Judgment: Judgment normal.         Assessment blood pressure remains normal.  Will check a lipid panel and comprehensive metabolic panel today and adjust rosuvastatin dose if necessary.  ASSESSMENT    Problems Addressed this Visit          Cardiac and Vasculature    Hyperlipidemia - Primary    Relevant Orders    Comprehensive Metabolic Panel    Lipid Panel With LDL / HDL Ratio     Diagnoses         Codes Comments    Familial hypercholesterolemia    -  Primary ICD-10-CM: E78.01  ICD-9-CM: 272.0             PLAN  There are no Patient Instructions on file for this visit.    No follow-ups on file.  Answers submitted by the patient for this visit:  Other (Submitted on 3/8/2024)  Please describe your symptoms.: Cholesterol follow up  Have you had these symptoms before?: Yes  How long have you been having these symptoms?: Greater than 2 weeks  Please list any medications you are currently taking for this condition.: Cholesterol medicine  Primary Reason for Visit (Submitted on 3/8/2024)  What  is the primary reason for your visit?: Other

## 2024-03-09 LAB
ALBUMIN SERPL-MCNC: 4.5 G/DL (ref 3.8–4.9)
ALBUMIN/GLOB SERPL: 1.7 {RATIO} (ref 1.2–2.2)
ALP SERPL-CCNC: 99 IU/L (ref 44–121)
ALT SERPL-CCNC: 38 IU/L (ref 0–32)
AST SERPL-CCNC: 32 IU/L (ref 0–40)
BILIRUB SERPL-MCNC: 0.4 MG/DL (ref 0–1.2)
BUN SERPL-MCNC: 14 MG/DL (ref 8–27)
BUN/CREAT SERPL: 15 (ref 12–28)
CALCIUM SERPL-MCNC: 9.9 MG/DL (ref 8.7–10.3)
CHLORIDE SERPL-SCNC: 104 MMOL/L (ref 96–106)
CHOLEST SERPL-MCNC: 200 MG/DL (ref 100–199)
CO2 SERPL-SCNC: 23 MMOL/L (ref 20–29)
CREAT SERPL-MCNC: 0.91 MG/DL (ref 0.57–1)
EGFRCR SERPLBLD CKD-EPI 2021: 72 ML/MIN/1.73
GLOBULIN SER CALC-MCNC: 2.6 G/DL (ref 1.5–4.5)
GLUCOSE SERPL-MCNC: ABNORMAL MG/DL
HDLC SERPL-MCNC: 56 MG/DL
LDLC SERPL CALC-MCNC: 110 MG/DL (ref 0–99)
LDLC/HDLC SERPL: 2 RATIO (ref 0–3.2)
POTASSIUM SERPL-SCNC: ABNORMAL MMOL/L
PROT SERPL-MCNC: 7.1 G/DL (ref 6–8.5)
SODIUM SERPL-SCNC: 147 MMOL/L (ref 134–144)
TRIGL SERPL-MCNC: 197 MG/DL (ref 0–149)
VLDLC SERPL CALC-MCNC: 34 MG/DL (ref 5–40)

## 2024-03-11 ENCOUNTER — TELEPHONE (OUTPATIENT)
Dept: FAMILY MEDICINE CLINIC | Facility: CLINIC | Age: 59
End: 2024-03-11
Payer: COMMERCIAL

## 2024-03-11 NOTE — TELEPHONE ENCOUNTER
Left voicemail about recent lab work.  Will continue rosuvastatin 10 mg daily and follow-up in 6 months with a fasting lipid panel

## 2024-04-03 ENCOUNTER — OFFICE VISIT (OUTPATIENT)
Dept: ORTHOPEDIC SURGERY | Facility: CLINIC | Age: 59
End: 2024-04-03
Payer: COMMERCIAL

## 2024-04-03 VITALS — WEIGHT: 144.6 LBS | TEMPERATURE: 96.9 F | BODY MASS INDEX: 26.61 KG/M2 | HEIGHT: 62 IN

## 2024-04-03 DIAGNOSIS — M43.16 SPONDYLOLISTHESIS OF LUMBAR REGION: Primary | ICD-10-CM

## 2024-04-03 DIAGNOSIS — M54.50 LUMBAR SPINE PAIN: ICD-10-CM

## 2024-04-03 PROCEDURE — 99213 OFFICE O/P EST LOW 20 MIN: CPT | Performed by: NURSE PRACTITIONER

## 2024-04-03 NOTE — PROGRESS NOTES
Patient Name: Maricarmen Hayes   YOB: 1965  Referring Primary Care Physician: Yordy Mishra MD      Chief Complaint:    Chief Complaint   Patient presents with    Lumbar Spine - Follow-up, Pain            HPI:  Maricarmen Hayes is a 59 y.o. female who presents to [facility] for follow-up of chronic low back pain.  Symptoms are stable since last year.  Complains primarily of axial low back pain that is intermittent.  She is able to remain active around her acreage and continues to utilize her stationary bike on a regular basis.  No loss of bowel or bladder control.  She has been experiencing some urge incontinence.    PFSH:  See attached    ROS: As per HPI, otherwise negative    Objective:      59 y.o. female  Body mass index is 26.45 kg/m²., 65.6 kg (144 lb 9.6 oz), @HT@  Vitals:    04/03/24 0757   Temp: 96.9 °F (36.1 °C)     Pain Score    04/03/24 0757   PainSc: 0-No pain   PainLoc: Back            Spine Musculoskeletal Exam    Gait    Gait is normal.    Inspection    Coronal balance: no imbalance    Sagittal balance: no imbalance    Palpation    Thoracolumbar    Right      Muscle tone: normal    Left      Muscle tone: normal    Strength    Thoracolumbar    Thoracolumbar motor exam is normal.       Sensory    Thoracolumbar    Thoracolumbar sensation is normal.    Reflexes    Right      Quadriceps: 2/4      Achilles: 2/4     Left      Quadriceps: 2/4      Achilles: 2/4    Special Tests    Thoracolumbar      Right      SLR: no back or leg pain      Left      SLR: no back or leg pain    General      Constitutional: well-developed and well-nourished    Scleral icterus: no    Labored breathing: no    Psychiatric: normal mood and affect and no acute distress    Neurological: alert and oriented x3    Skin: intact        IMAGING:     Indication: pain related symptoms,  Views: 2V AP&LAT lumbar  Findings: Reviewed and reveals multilevel degenerative disc and facet changes, grade  1 anterolisthesis of L4 in relation to L3 and L5, grade 1 retrolisthesis L2 on L3, disc space swelling most pronounced L2-3  Comparison views: No significant change from April 2022    Assessment:           Diagnoses and all orders for this visit:    1. Spondylolisthesis of lumbar region (Primary)    2. Lumbar spine pain  -     XR Spine Lumbar 2 or 3 View             Plan:    Lumbar films are stable since last visit.  Overall her back pain is stable as well.  No loss of nerve function on exam.  She is staying active without much difficulty.  We have discussed red flags.  Will follow-up in 1 year, sooner if anything changes.  Patient can call at any time with questions or concerns or reevaluation.  She understands and agrees with the plan.    Return in about 1 year (around 4/3/2025).    EMR Dragon/Transcription Disclaimer:   Much of this encounter note is an electronic transcription/translation of spoken language to printed text. The electronic translation of spoken language may permit erroneous, or at times, nonsensical words or phrases to be inadvertently transcribed; Although I have reviewed the note for such errors, some may still exist.  Red flags have been discussed at this or previous visits to include but not limited weakness in extremities, worsening pain that does not respond to conservative treatment and bowel or bladder dysfunction. These are reasons to present to ER and patient has been informed.    The diagnosis(es), natural history, pathophysiology and treatment for diagnosis(es) were discussed. Opportunity given and questions answered. Biomechanics of pertinent body areas discussed.    EXERCISES:  Advice on benefits of, and types of regular/moderate exercise pertaining to diagnosis.  Continue HEP. For back or neck pain, recommend pilates and or yoga as tolerated. Generally it is best to start any new exercise under the guidance of a  or therapist.   MEDICATIONS:  When prescribe, the risks,  benefits, warnings,side effects and alternatives of medications discussed. Advised against long term use of narcotics.   PAIN CONTROL:  Cold, heat, OTC lidocaine patches and/or ointment as needed. Avoid direct skin contact with ice. Ice 15-20 minutes 3-4 times daily as needed. For SI joint pain, recommend ice bath in water about 50 degrees for 5 consecutive days, add ice slowly to help with adjustment and may cover with warm towel or robe to help with cold tolerance. If using lidocaine, do not apply heat in conjunction as this can cause a burn.   MEDICAL RECORDS reviewed from other provider(s) for past and current medical history pertinent to this visit..

## 2024-04-12 ENCOUNTER — OFFICE VISIT (OUTPATIENT)
Dept: OBSTETRICS AND GYNECOLOGY | Age: 59
End: 2024-04-12
Payer: COMMERCIAL

## 2024-04-12 VITALS
DIASTOLIC BLOOD PRESSURE: 70 MMHG | SYSTOLIC BLOOD PRESSURE: 128 MMHG | BODY MASS INDEX: 26.5 KG/M2 | WEIGHT: 144 LBS | HEIGHT: 62 IN

## 2024-04-12 DIAGNOSIS — Z11.51 SCREENING FOR HUMAN PAPILLOMAVIRUS (HPV): ICD-10-CM

## 2024-04-12 DIAGNOSIS — Z12.31 SCREENING MAMMOGRAM FOR BREAST CANCER: ICD-10-CM

## 2024-04-12 DIAGNOSIS — Z01.419 WELL FEMALE EXAM WITH ROUTINE GYNECOLOGICAL EXAM: Primary | ICD-10-CM

## 2024-04-12 DIAGNOSIS — N39.498 OTHER URINARY INCONTINENCE: ICD-10-CM

## 2024-04-12 DIAGNOSIS — Z12.4 SCREENING FOR MALIGNANT NEOPLASM OF CERVIX: ICD-10-CM

## 2024-04-12 DIAGNOSIS — Z13.89 SCREENING FOR BLOOD OR PROTEIN IN URINE: ICD-10-CM

## 2024-04-12 PROBLEM — Z00.00 ANNUAL PHYSICAL EXAM: Status: RESOLVED | Noted: 2020-07-30 | Resolved: 2024-04-12

## 2024-04-12 PROBLEM — R73.01 ELEVATED FASTING BLOOD SUGAR: Status: RESOLVED | Noted: 2018-02-01 | Resolved: 2024-04-12

## 2024-04-12 PROBLEM — R03.0 ELEVATED BLOOD PRESSURE READING IN OFFICE WITHOUT DIAGNOSIS OF HYPERTENSION: Status: RESOLVED | Noted: 2019-03-06 | Resolved: 2024-04-12

## 2024-04-12 PROBLEM — M25.511 ACUTE PAIN OF RIGHT SHOULDER: Status: RESOLVED | Noted: 2019-08-01 | Resolved: 2024-04-12

## 2024-04-12 LAB
BILIRUB BLD-MCNC: NEGATIVE MG/DL
CLARITY, POC: CLEAR
COLOR UR: YELLOW
GLUCOSE UR STRIP-MCNC: NEGATIVE MG/DL
KETONES UR QL: NEGATIVE
LEUKOCYTE EST, POC: NEGATIVE
NITRITE UR-MCNC: NEGATIVE MG/ML
PH UR: 7 [PH] (ref 5–8)
PROT UR STRIP-MCNC: NEGATIVE MG/DL
RBC # UR STRIP: NEGATIVE /UL
SP GR UR: 1.02 (ref 1–1.03)
UROBILINOGEN UR QL: NORMAL

## 2024-04-12 RX ORDER — VITAMIN A, ASCORBIC ACID, CHOLECALCIFEROL, ALPHA-TOCOPHEROL ACETATE, THIAMINE HYDROCHLORIDE, RIBOFLAVIN 5-PHOSPHATE SODIUM, NIACINAMIDE, PYRIDOXINE HYDROCHLORIDE, FERROUS SULFATE AND SODIUM FLUORIDE 1500; 35; 400; 5; .5; .6; 8; .4; 10; .25 [IU]/ML; MG/ML; [IU]/ML; [IU]/ML; MG/ML; MG/ML; MG/ML; MG/ML; MG/ML; MG/ML
LIQUID ORAL
COMMUNITY

## 2024-04-12 RX ORDER — TRETINOIN 0.5 MG/G
1 CREAM TOPICAL
COMMUNITY
Start: 2024-03-10 | End: 2024-04-12

## 2024-04-12 NOTE — PROGRESS NOTES
[facility]   Obstetrics and Gynecology     2024    Patient: Maricarmen Hayes          MR#:[id number]    History of Present Illness    Chief Complaint   Patient presents with    Gynecologic Exam     CC: Annual. last pap 20, HPV neg, last mammo 23, colon 2016, Dexa 23       59 y.o. female  who presents for annual exam.    The patient presents feeling well without major complaints.  She has noticed some periodic urinary incontinence.  She states 2 or 3 episodes a week and some urgency with intercourse.  We discussed Kegel exercises and options for urogynecology consult if symptoms worsen      Relevant data reviewed:    No LMP recorded (lmp unknown). Patient has had a hysterectomy.  Obstetric History:  OB History          3    Para   2    Term   2            AB   1    Living   2         SAB        IAB        Ectopic   1    Molar        Multiple        Live Births   2               Menstrual History:     No LMP recorded (lmp unknown). Patient has had a hysterectomy.       Social History     Substance and Sexual Activity   Sexual Activity Yes    Partners: Male    Birth control/protection: Surgical    Comment: HYSTERECTOMY       ______________________________________  Patient Active Problem List   Diagnosis    Hyperlipidemia    Osteoarthritis of right shoulder    Decreased libido    Hip pain, chronic, right    Chronic right-sided low back pain with sciatica    Spinal stenosis of lumbar region without neurogenic claudication    Osteopenia, senile     Past Medical History:   Diagnosis Date    Abnormal Pap smear of cervix     Anemia     Fibrocystic breast     Hyperlipidemia     S/P reverse total shoulder arthroplasty, right 2020     Past Surgical History:   Procedure Laterality Date    BILATERAL SALPINGO OOPHORECTOMY  2015    Dr. Reynolds    BLEPHAROPLASTY Bilateral 2019    Procedure: BLEPHAROPLASTY;  Surgeon: Kye Guerrero MD;  Location:   [facility];  Service: Plastics    BROW LIFT AND BLEPHAROPLASTY Bilateral 2019    Procedure: BROW LIFT AND  BILATERAL UPPER LID BLEPHAROPLASTY;  Surgeon: Kye Guerrero MD;  Location:  [facility];  Service: Plastics     SECTION  1984    COLONOSCOPY N/A 2016    Procedure: COLONOSCOPY;  Surgeon: Devon Campos MD;  Location:  [facility];  Service:     OOPHORECTOMY      SHOULDER SURGERY      SUPRACERVICAL HYSTERECTOMY SALPINGO OOPHORECTOMY  2015    supracervical hyst/ Brown     TOTAL SHOULDER ARTHROPLASTY W/ DISTAL CLAVICLE EXCISION Right 2020    Procedure: RIGHT TOTAL SHOULDER REVERSE ARTHROPLASTY WITH DISTAL CLAVICLE EXCISION;  Surgeon: Humberto Up MD;  Location:  [facility];  Service: Orthopedics;  Laterality: Right;    TUBAL ABDOMINAL LIGATION       Social History     Tobacco Use   Smoking Status Never    Passive exposure: Never   Smokeless Tobacco Never     Family History   Problem Relation Age of Onset    Hyperlipidemia Mother     Ovarian cancer Mother     Rheum arthritis Mother     Arthritis Mother     Arthritis Father     Hyperlipidemia Maternal Grandmother     Alzheimer's disease Paternal Grandmother     Rheum arthritis Maternal Aunt     Hyperlipidemia Maternal Aunt     Ovarian cancer Maternal Aunt     Hyperlipidemia Maternal Aunt     Ovarian cancer Maternal Aunt     Malig Hyperthermia Neg Hx     Breast cancer Neg Hx      Prior to Admission medications    Medication Sig Start Date End Date Taking? Authorizing Provider   Ped Multivitamins-Fl-Iron (Multi-Vitamin/Fluoride/Iron) 0.25-10 MG/ML solution Take  by mouth.   Yes Shante Collazo MD   rosuvastatin (Crestor) 10 MG tablet Take 1 tablet by mouth Daily. 23  Yes Yordy Mishra MD   Calcium Carbonate 1500 (600 Ca) MG tablet Take 1 tablet by mouth Daily.  Patient not taking: Reported on 2024  Shante Collazo MD   tretinoin (RETIN-A) 0.05 % cream 1  "Application.  Patient not taking: Reported on 4/12/2024 3/10/24 4/12/24  Shante Collazo MD   vitamin C (ASCORBIC ACID) 250 MG tablet Take 1 tablet by mouth Daily.  Patient not taking: Reported on 4/12/2024 4/12/24  Shante Collazo MD   vitamin E 1000 UNIT capsule Take 1 capsule by mouth Daily.  Patient not taking: Reported on 4/12/2024 4/12/24  Shante Collazo MD   Zinc 100 MG tablet Take  by mouth.  Patient not taking: Reported on 4/12/2024 4/12/24  Shante Collazo MD     _______________________________________    Current contraception: tubal ligation  History of abnormal Pap smear: no  Family history of uterine or ovarian cancer: no  Family History of colon cancer/colon polyps: no  History of abnormal mammogram: no  History of abnormal lipids: yes - treated    The following portions of the patient's history were reviewed and updated as appropriate: allergies, current medications, past family history, past medical history, past social history, past surgical history, and problem list.    Review of Systems    Pertinent items are noted in HPI.       Objective   Physical Exam    /70   Ht 157.5 cm (62\")   Wt 65.3 kg (144 lb)   LMP  (LMP Unknown)   BMI 26.34 kg/m²    BP Readings from Last 3 Encounters:   04/12/24 128/70   03/08/24 110/76   02/06/24 129/84      Wt Readings from Last 3 Encounters:   04/12/24 65.3 kg (144 lb)   04/03/24 65.6 kg (144 lb 9.6 oz)   03/08/24 65.3 kg (144 lb)        BMI: Estimated body mass index is 26.34 kg/m² as calculated from the following:    Height as of this encounter: 157.5 cm (62\").    Weight as of this encounter: 65.3 kg (144 lb).       General: alert, appears stated age, and cooperative   Heart: regular rate and rhythm, S1, S2 normal, no murmur, click, rub or gallop   Lungs: clear to auscultation bilaterally   Abdomen: soft, non-tender, without masses, no organomegaly   Breast: inspection negative, no nipple discharge or bleeding, no masses or " nodularity palpable   External genitalia/Vulva: External genitalia including bartholin's glands, Urethra, [place]'s gland and urethra meatus are normal, Perineum, rectum and anus appear normal , and Bladder appears normal without significant prolapse    Vagina: normal mucosa, normal discharge   Cervix: no lesions   Uterus: normal size and non-tender   Adnexa: normal adnexa     As part of wellness and prevention, the following topics were discussed with the patient:  Encouraged self breast exam  Physical activity and regular exercised encouraged.         Problem List   Meds  History  Prep for Surg   Imagin}    Assessment:  Diagnoses and all orders for this visit:    1. Well female exam with routine gynecological exam (Primary)  -     IGP, Apt HPV,rfx 16 / 18,45    2. Screening for human papillomavirus (HPV)  -     IGP, Apt HPV,rfx 16 / 18,45    3. Screening for malignant neoplasm of cervix  -     IGP, Apt HPV,rfx 16 / 18,45    4. Screening mammogram for breast cancer  -     Mammo Screening Digital Tomosynthesis Bilateral With CAD; Future    5. Screening for blood or protein in urine  -     POC Urinalysis Dipstick      Plan:  Return in 1 year (on 2025) for Annual exam.    Vern Reynolds MD  2024 08:44 EDT

## 2024-04-17 LAB
CYTOLOGIST CVX/VAG CYTO: NORMAL
CYTOLOGY CVX/VAG DOC CYTO: NORMAL
CYTOLOGY CVX/VAG DOC THIN PREP: NORMAL
DX ICD CODE: NORMAL
HPV I/H RISK 4 DNA CVX QL PROBE+SIG AMP: NEGATIVE
Lab: NORMAL
OTHER STN SPEC: NORMAL
STAT OF ADQ CVX/VAG CYTO-IMP: NORMAL

## 2024-06-17 ENCOUNTER — HOSPITAL ENCOUNTER (OUTPATIENT)
Dept: MAMMOGRAPHY | Facility: HOSPITAL | Age: 59
Discharge: HOME | End: 2024-06-17
Admitting: OBSTETRICS & GYNECOLOGY
Payer: COMMERCIAL

## 2024-06-17 DIAGNOSIS — Z12.31 SCREENING MAMMOGRAM FOR BREAST CANCER: ICD-10-CM

## 2024-06-17 PROCEDURE — 77067 SCR MAMMO BI INCL CAD: CPT

## 2024-06-17 PROCEDURE — 77063 BREAST TOMOSYNTHESIS BI: CPT

## 2024-08-20 ENCOUNTER — TELEPHONE (OUTPATIENT)
Dept: OBSTETRICS AND GYNECOLOGY | Age: 59
End: 2024-08-20
Payer: COMMERCIAL

## 2024-08-20 DIAGNOSIS — N39.3 STRESS INCONTINENCE OF URINE: Primary | ICD-10-CM

## 2024-08-20 NOTE — TELEPHONE ENCOUNTER
Provider: DR. VELASQUEZ    Caller: SIM RAMIREZ    Relationship to Patient: SELF    Pharmacy:     Phone Number: [phone]    Reason for Call: PT ADV SYMPTOMS OF URINARY INCONTINENCE STILL OCCURRING. AT LEAST 2-3X A WEEK, WHEN SHE THINKS SHE IS COMPLETED URINATING, IS EXPERIENCING LEAKAGE. PER NOTES 4-12-24 DR. VELASQUEZ ADV IF SYMPTOMS PERSISTED, POSS REFERRAL TO UROGYNECOLOGIST.    When was the patient last seen: 04/24

## 2024-09-12 RX ORDER — ROSUVASTATIN CALCIUM 10 MG/1
10 TABLET, COATED ORAL DAILY
Qty: 60 TABLET | Refills: 2 | Status: SHIPPED | OUTPATIENT
Start: 2024-09-12

## 2024-10-09 NOTE — TELEPHONE ENCOUNTER
Discussed lumbosacral spine x-ray results.  You need an MRI of your lumbosacral spine and appropriate referral to spine surgery after review of results.  
Med Rec - Admission

## 2024-11-04 ENCOUNTER — OFFICE VISIT (OUTPATIENT)
Dept: ORTHOPEDIC SURGERY | Facility: CLINIC | Age: 59
End: 2024-11-04
Payer: COMMERCIAL

## 2024-11-04 VITALS — TEMPERATURE: 98 F | BODY MASS INDEX: 25.16 KG/M2 | WEIGHT: 136.7 LBS | HEIGHT: 62 IN

## 2024-11-04 DIAGNOSIS — M47.816 FACET ARTHRITIS OF LUMBAR REGION: ICD-10-CM

## 2024-11-04 DIAGNOSIS — M43.16 SPONDYLOLISTHESIS OF LUMBAR REGION: Primary | ICD-10-CM

## 2024-11-04 DIAGNOSIS — M54.50 LUMBAR SPINE PAIN: ICD-10-CM

## 2024-11-04 PROCEDURE — 99213 OFFICE O/P EST LOW 20 MIN: CPT | Performed by: NURSE PRACTITIONER

## 2024-11-04 NOTE — PROGRESS NOTES
Patient Name: Maricarmen Hayes   YOB: 1965  Referring Primary Care Physician: Yordy Mishra MD      Chief Complaint:    Chief Complaint   Patient presents with    Lumbar Spine - Follow-up, Pain          HPI:  Maricarmen Hayes is a 59 y.o. female who presents to [facility] for follow-up of acute on chronic low back pain.  Since last visit 7 months ago, she has had increase in episodes of severe low back pain.  She has had transient numbness in left foot but that has resolved.  She is trying to remain active working out regularly and walking with friends routinely.  Back pain does become life limiting.  She says at times she has a difficult time standing up straight.    PFSH:  See attached    ROS: As per HPI, otherwise negative    Objective:      59 y.o. female  Body mass index is 25 kg/m²., 62 kg (136 lb 11.2 oz), @HT@  Vitals:    11/04/24 1546   Temp: 98 °F (36.7 °C)     Pain Score    11/04/24 1546   PainSc: 10-Worst pain ever   PainLoc: Back            Spine Musculoskeletal Exam    Gait    Gait is normal.    Palpation    Thoracolumbar    Right      Muscle tone: normal    Left      Muscle tone: normal    Strength    Thoracolumbar    Thoracolumbar motor exam is normal.       Sensory    Thoracolumbar    Thoracolumbar sensation is normal.    Reflexes    Right      Quadriceps: 1/4      Achilles: 0/4     Left      Quadriceps: 1/4      Achilles: 0/4    Special Tests    Thoracolumbar      Right      SLR: no back or leg pain      Left      SLR: no back or leg pain    General      Constitutional: well-developed and well-nourished    Scleral icterus: no    Labored breathing: no    Psychiatric: normal mood and affect and no acute distress    Neurological: alert and oriented x3    Skin: intact        IMAGING:     No new imaging today    Assessment:           Diagnoses and all orders for this visit:    1. Spondylolisthesis of lumbar region (Primary)  -     MRI Lumbar Spine Without  Received a fax from [facility] stating that \"patient has irritation/redness in gale-area, can we have an order for barrier cream?\" After careful review, Dr. Pearl Benavides states ok PRN. Order faxed back to [phone]. Contrast; Future    2. Lumbar spine pain  -     MRI Lumbar Spine Without Contrast; Future    3. Facet arthritis of lumbar region  -     MRI Lumbar Spine Without Contrast; Future             Plan:    For the worsening low back pain that has become more episodic and can become life limiting despite patient trying to remain active and continue with her therapy exercises, will submit for updated lumbar MRI with an eye toward injection therapy.  Likely pain is facet mediated.  She does have spondylolisthesis and facet arthropathy in the lower lumbar spine.  She has no loss of nerve function on exam today and would like to avoid surgery at all cost, but would consider injections to help maintain activity and minimize back pain.  Will call with results.  Return for Call with results.    EMR Dragon/Transcription Disclaimer:   Much of this encounter note is an electronic transcription/translation of spoken language to printed text. The electronic translation of spoken language may permit erroneous, or at times, nonsensical words or phrases to be inadvertently transcribed; Although I have reviewed the note for such errors, some may still exist.  Red flags have been discussed at this or previous visits to include but not limited weakness in extremities, worsening pain that does not respond to conservative treatment and bowel or bladder dysfunction. These are reasons to present to ER and patient has been informed.    The diagnosis(es), natural history, pathophysiology and treatment for diagnosis(es) were discussed. Opportunity given and questions answered. Biomechanics of pertinent body areas discussed.    EXERCISES:  Advice on benefits of, and types of regular/moderate exercise pertaining to diagnosis.  Continue HEP. For back or neck pain, recommend pilates and or yoga as tolerated. Generally it is best to start any new exercise under the guidance of a  or therapist.   MEDICATIONS:  When prescribe, the risks, benefits,  warnings,side effects and alternatives of medications discussed. Advised against long term use of narcotics.   PAIN CONTROL:  Cold, heat, OTC lidocaine patches and/or ointment as needed. Avoid direct skin contact with ice. Ice 15-20 minutes 3-4 times daily as needed. For SI joint pain, recommend ice bath in water about 50 degrees for 5 consecutive days, add ice slowly to help with adjustment and may cover with warm towel or robe to help with cold tolerance. If using lidocaine, do not apply heat in conjunction as this can cause a burn.   MEDICAL RECORDS reviewed from other provider(s) for past and current medical history pertinent to this visit..

## 2024-11-12 ENCOUNTER — HOSPITAL ENCOUNTER (OUTPATIENT)
Dept: MRI IMAGING | Facility: HOSPITAL | Age: 59
Discharge: HOME | End: 2024-11-12
Admitting: NURSE PRACTITIONER
Payer: COMMERCIAL

## 2024-11-12 DIAGNOSIS — M47.816 FACET ARTHRITIS OF LUMBAR REGION: ICD-10-CM

## 2024-11-12 DIAGNOSIS — M54.50 LUMBAR SPINE PAIN: ICD-10-CM

## 2024-11-12 DIAGNOSIS — M43.16 SPONDYLOLISTHESIS OF LUMBAR REGION: ICD-10-CM

## 2024-11-12 PROCEDURE — 72148 MRI LUMBAR SPINE W/O DYE: CPT

## 2024-11-19 DIAGNOSIS — Z00.00 ANNUAL PHYSICAL EXAM: Primary | ICD-10-CM

## 2024-11-19 DIAGNOSIS — M85.80 OSTEOPENIA, SENILE: ICD-10-CM

## 2024-11-19 DIAGNOSIS — E78.01 FAMILIAL HYPERCHOLESTEROLEMIA: ICD-10-CM

## 2024-11-19 LAB
ALBUMIN SERPL-MCNC: 4.3 G/DL (ref 3.5–5.2)
ALBUMIN/GLOB SERPL: 1.8 G/DL
ALP SERPL-CCNC: 88 U/L (ref 39–117)
ALT SERPL-CCNC: 27 U/L (ref 1–33)
APPEARANCE UR: CLEAR
AST SERPL-CCNC: 32 U/L (ref 1–32)
BASOPHILS # BLD AUTO: 0.03 10*3/MM3 (ref 0–0.2)
BASOPHILS NFR BLD AUTO: 0.9 % (ref 0–1.5)
BILIRUB SERPL-MCNC: 0.7 MG/DL (ref 0–1.2)
BILIRUB UR QL STRIP: NEGATIVE
BUN SERPL-MCNC: 11 MG/DL (ref 8–23)
BUN/CREAT SERPL: 13.1 (ref 7–25)
CALCIUM SERPL-MCNC: 9.4 MG/DL (ref 8.6–10.5)
CHLORIDE SERPL-SCNC: 104 MMOL/L (ref 98–107)
CHOLEST SERPL-MCNC: 165 MG/DL (ref 0–200)
CO2 SERPL-SCNC: 27.3 MMOL/L (ref 22–29)
COLOR UR: YELLOW
CREAT SERPL-MCNC: 0.84 MG/DL (ref 0.57–1)
EGFRCR SERPLBLD CKD-EPI 2021: 79.7 ML/MIN/1.73
EOSINOPHIL # BLD AUTO: 0.12 10*3/MM3 (ref 0–0.4)
EOSINOPHIL NFR BLD AUTO: 3.7 % (ref 0.3–6.2)
ERYTHROCYTE [DISTWIDTH] IN BLOOD BY AUTOMATED COUNT: 12.1 % (ref 12.3–15.4)
GLOBULIN SER CALC-MCNC: 2.4 GM/DL
GLUCOSE SERPL-MCNC: 94 MG/DL (ref 65–99)
GLUCOSE UR QL STRIP: NEGATIVE
HCT VFR BLD AUTO: 44.8 % (ref 34–46.6)
HDLC SERPL-MCNC: 52 MG/DL (ref 40–60)
HGB BLD-MCNC: 14.8 G/DL (ref 12–15.9)
HGB UR QL STRIP: NEGATIVE
IMM GRANULOCYTES # BLD AUTO: 0 10*3/MM3 (ref 0–0.05)
IMM GRANULOCYTES NFR BLD AUTO: 0 % (ref 0–0.5)
KETONES UR QL STRIP: NEGATIVE
LDLC SERPL CALC-MCNC: 98 MG/DL (ref 0–100)
LDLC/HDLC SERPL: 1.86 {RATIO}
LEUKOCYTE ESTERASE UR QL STRIP: NEGATIVE
LYMPHOCYTES # BLD AUTO: 1.18 10*3/MM3 (ref 0.7–3.1)
LYMPHOCYTES NFR BLD AUTO: 36.5 % (ref 19.6–45.3)
MCH RBC QN AUTO: 30.6 PG (ref 26.6–33)
MCHC RBC AUTO-ENTMCNC: 33 G/DL (ref 31.5–35.7)
MCV RBC AUTO: 92.6 FL (ref 79–97)
MONOCYTES # BLD AUTO: 0.31 10*3/MM3 (ref 0.1–0.9)
MONOCYTES NFR BLD AUTO: 9.6 % (ref 5–12)
NEUTROPHILS # BLD AUTO: 1.59 10*3/MM3 (ref 1.7–7)
NEUTROPHILS NFR BLD AUTO: 49.3 % (ref 42.7–76)
NITRITE UR QL STRIP: NEGATIVE
NRBC BLD AUTO-RTO: 0 /100 WBC (ref 0–0.2)
PH UR STRIP: 6 [PH] (ref 5–8)
PLATELET # BLD AUTO: 188 10*3/MM3 (ref 140–450)
POTASSIUM SERPL-SCNC: 4.4 MMOL/L (ref 3.5–5.2)
PROT SERPL-MCNC: 6.7 G/DL (ref 6–8.5)
PROT UR QL STRIP: NEGATIVE
RBC # BLD AUTO: 4.84 10*6/MM3 (ref 3.77–5.28)
SODIUM SERPL-SCNC: 139 MMOL/L (ref 136–145)
SP GR UR STRIP: 1.02 (ref 1–1.03)
TRIGL SERPL-MCNC: 81 MG/DL (ref 0–150)
TSH SERPL DL<=0.005 MIU/L-ACNC: 2.78 UIU/ML (ref 0.27–4.2)
UROBILINOGEN UR STRIP-MCNC: NORMAL MG/DL
VLDLC SERPL CALC-MCNC: 15 MG/DL (ref 5–40)
WBC # BLD AUTO: 3.23 10*3/MM3 (ref 3.4–10.8)

## 2024-11-20 DIAGNOSIS — M43.16 SPONDYLOLISTHESIS OF LUMBAR REGION: Primary | ICD-10-CM

## 2024-11-22 ENCOUNTER — OFFICE VISIT (OUTPATIENT)
Dept: FAMILY MEDICINE CLINIC | Facility: CLINIC | Age: 59
End: 2024-11-22
Payer: COMMERCIAL

## 2024-11-22 VITALS
HEART RATE: 77 BPM | WEIGHT: 141 LBS | HEIGHT: 62 IN | SYSTOLIC BLOOD PRESSURE: 118 MMHG | TEMPERATURE: 98 F | DIASTOLIC BLOOD PRESSURE: 76 MMHG | RESPIRATION RATE: 16 BRPM | OXYGEN SATURATION: 95 % | BODY MASS INDEX: 25.95 KG/M2

## 2024-11-22 DIAGNOSIS — Z00.00 ANNUAL PHYSICAL EXAM: ICD-10-CM

## 2024-11-22 DIAGNOSIS — Z23 NEED FOR IMMUNIZATION AGAINST INFLUENZA: ICD-10-CM

## 2024-11-22 DIAGNOSIS — M48.061 SPINAL STENOSIS OF LUMBAR REGION WITHOUT NEUROGENIC CLAUDICATION: ICD-10-CM

## 2024-11-22 DIAGNOSIS — E78.01 FAMILIAL HYPERCHOLESTEROLEMIA: Primary | ICD-10-CM

## 2024-11-22 RX ORDER — ESTRADIOL 0.1 MG/G
2 CREAM VAGINAL DAILY
COMMUNITY

## 2024-11-22 RX ORDER — TROSPIUM CHLORIDE ER 60 MG/1
60 CAPSULE ORAL EVERY MORNING
COMMUNITY
Start: 2024-11-07

## 2024-12-19 NOTE — PROGRESS NOTES
"Subjective   History of Present Illness: Maricarmen Hayes is a 59 y.o. female is being seen for consultation today at the request of ANGELICA Jones patient has a history of flareups of low back pain and lower extremity pain and numbness.  Currently her symptoms are tolerable.  No issues with walking in fact she has been walking more than usual as part of her exercise routine.  No significant issues today      Previous treatment:     Previous neurosurgery:      Previous injections:     The following portions of the patient's history were reviewed and updated as appropriate: allergies, current medications, past family history, past medical history, past social history, past surgical history, and problem list.    Review of Systems   Constitutional:  Positive for activity change.   Musculoskeletal:  Positive for back pain.   Neurological:  Positive for numbness. Negative for weakness.       Objective      Pulse 74   Temp 97.5 °F (36.4 °C) (Temporal)   Resp 18   Ht 157.5 cm (62\")   Wt 64.4 kg (142 lb)   LMP  (LMP Unknown)   SpO2 98%   BMI 25.97 kg/m²    Body mass index is 25.97 kg/m².  Vitals:    12/20/24 1004   PainSc: 0-No pain         Neurological Exam        Assessment & Plan   Independent Review of Radiographic Studies:      I personally reviewed and interpreted the images from the following studies.    MRI lumbar spine: Degenerative changes most severe from L2-5.  There is retrolisthesis of L2-3 with moderate central stenosis and synovial cyst formation with significant foraminal stenosis on the right.  At L3-4 there is retrolisthesis and moderate lateral recess and foraminal stenosis.  There is grade 1 spondylolisthesis at L4-5 with severe central stenosis.  Mild disc degeneration at L5-S1.    Medical Decision Making:      Maricarmen Hayes is a 59 y.o. female with a history of flareups of low back pain and radiculopathy who symptoms are tolerable at this point.  She has spondylolisthesis and central and " foraminal stenosis from L2-L5 on MRI.  I discussed with her that surgical option would likely be L2-5 decompression fusion.  Given that she is doing well at this point with no significant symptoms we will not proceed with surgery.  Should symptoms worsen over the course of time and be causing her an issue on a daily basis she can follow-up with me for surgical discussion.      Diagnoses and all orders for this visit:    1. Spondylolisthesis of lumbar region (Primary)      No follow-ups on file.    This patient was examined wearing appropriate personal protective equipment.                      Dr. Krishna Curtis IV    12/20/24  10:18 EST

## 2024-12-20 ENCOUNTER — OFFICE VISIT (OUTPATIENT)
Dept: NEUROSURGERY | Facility: CLINIC | Age: 59
End: 2024-12-20
Payer: COMMERCIAL

## 2024-12-20 VITALS
HEIGHT: 62 IN | TEMPERATURE: 97.5 F | BODY MASS INDEX: 26.13 KG/M2 | RESPIRATION RATE: 18 BRPM | OXYGEN SATURATION: 98 % | HEART RATE: 74 BPM | WEIGHT: 142 LBS

## 2024-12-20 DIAGNOSIS — M43.16 SPONDYLOLISTHESIS OF LUMBAR REGION: Primary | ICD-10-CM

## 2025-02-24 RX ORDER — ROSUVASTATIN CALCIUM 10 MG/1
10 TABLET, COATED ORAL DAILY
Qty: 90 TABLET | Refills: 3 | Status: SHIPPED | OUTPATIENT
Start: 2025-02-24

## 2025-03-04 ENCOUNTER — OFFICE VISIT (OUTPATIENT)
Dept: ORTHOPEDIC SURGERY | Facility: CLINIC | Age: 60
End: 2025-03-04
Payer: COMMERCIAL

## 2025-03-04 VITALS — TEMPERATURE: 98.7 F | HEIGHT: 62 IN | WEIGHT: 139 LBS | BODY MASS INDEX: 25.58 KG/M2

## 2025-03-04 DIAGNOSIS — M47.816 FACET ARTHRITIS OF LUMBAR REGION: Primary | ICD-10-CM

## 2025-03-04 DIAGNOSIS — M54.50 LUMBAR SPINE PAIN: ICD-10-CM

## 2025-03-04 DIAGNOSIS — M43.16 SPONDYLOLISTHESIS OF LUMBAR REGION: ICD-10-CM

## 2025-03-04 PROCEDURE — 99214 OFFICE O/P EST MOD 30 MIN: CPT | Performed by: NURSE PRACTITIONER

## 2025-03-04 RX ORDER — METHYLPREDNISOLONE 4 MG/1
TABLET ORAL
Qty: 21 TABLET | Refills: 0 | Status: SHIPPED | OUTPATIENT
Start: 2025-03-04

## 2025-03-04 NOTE — PROGRESS NOTES
Patient Name: Maricarmen Hayes   YOB: 1965  Referring Primary Care Physician: Yordy Mishra MD      Chief Complaint:    Chief Complaint   Patient presents with    Lumbar Spine - Follow-up            HPI:  Maricarmen Hayes is a 60 y.o. female who presents to [facility] for follow-up of acute on chronic low back pain referring into the left lower extremity.  Since last visit, she did establish care with Dr. Curtis to discuss surgical options.  She was identified to have surgical pathology with fairly significant stenosis especially at L4-5.  At that point symptoms were manageable, but she recently went on vacation in flareup primarily with low back pain radiating to the lateral aspect of left lower extremity with tingling in the left lateral foot.  She is here to discuss options.    PFSH:  See attached    ROS: As per HPI, otherwise negative    Objective:      60 y.o. female  Body mass index is 25.42 kg/m²., 63 kg (139 lb), @HT@  Vitals:    03/04/25 1002   Temp: 98.7 °F (37.1 °C)     There were no vitals filed for this visit.         Spine Musculoskeletal Exam    Gait    Gait is normal.    General      Constitutional: well-developed and well-nourished    Scleral icterus: no    Labored breathing: no    Psychiatric: normal mood and affect and no acute distress    Neurological: alert and oriented x3    Skin: intact        IMAGING:     No imaging in office today.  Previous MRI discussed    Assessment:           Diagnoses and all orders for this visit:    1. Facet arthritis of lumbar region (Primary)  -     Ambulatory Referral to Physical Therapy for Evaluation & Treatment  -     Ambulatory Referral to [facility]    2. Spondylolisthesis of lumbar region  -     Ambulatory Referral to Physical Therapy for Evaluation & Treatment  -     Ambulatory Referral to Pain Management Clinic    3. Lumbar spine pain  -     Ambulatory Referral to Physical Therapy for Evaluation &  Treatment  -     Ambulatory Referral to Pain Management Clinic    Other orders  -     methylPREDNISolone (MEDROL) 4 MG dose pack; Use as directed by package instructions  Dispense: 21 tablet; Refill: 0             Plan:  Today we reviewed the options of physical therapy, injection options and surgery once again.  Dr. Curtis had recommended L2-L5 decompression and fusion whenever she gets to the point that she cannot tolerate symptoms any longer.  We have also discussed red flags previously and reviewed again today.  At this point she would like to exhaust conservative efforts with physical therapy and injections.  We discussed RFA and MACHO today.  Currently back pain is her more life limiting symptoms so may best to start with RFA through pain management, but that will be discussed further at her pain management appointment.  Will refer to physical therapy for core strengthening, home exercise program and to teach proper body mechanics.  She understands that with the progression of symptoms, this is leaning more towards surgical intervention.  She would like to return in 6 months and if still having flareups or life-limiting symptoms, she would like to proceed with surgical intervention and at that point we would likely need to update lumbar MRI.  She will call sooner if she develops any red flags or worsening symptoms.  Will provide her with a Medrol Dosepak as she has another trip coming up in July and is a bit worried that she may develop a flareup.  We discussed common side effects of steroids.    Return in about 6 months (around 9/4/2025).    EMR Dragon/Transcription Disclaimer:   Much of this encounter note is an electronic transcription/translation of spoken language to printed text. The electronic translation of spoken language may permit erroneous, or at times, nonsensical words or phrases to be inadvertently transcribed; Although I have reviewed the note for such errors, some may still exist.  Red flags have  been discussed at this or previous visits to include but not limited weakness in extremities, worsening pain that does not respond to conservative treatment and bowel or bladder dysfunction. These are reasons to present to ER and patient has been informed.    The diagnosis(es), natural history, pathophysiology and treatment for diagnosis(es) were discussed. Opportunity given and questions answered. Biomechanics of pertinent body areas discussed.    EXERCISES:  Advice on benefits of, and types of regular/moderate exercise pertaining to diagnosis.  Continue HEP. For back or neck pain, recommend pilates and or yoga as tolerated. Generally it is best to start any new exercise under the guidance of a  or therapist.   MEDICATIONS:  When prescribe, the risks, benefits, warnings,side effects and alternatives of medications discussed. Advised against long term use of narcotics.   PAIN CONTROL:  Cold, heat, OTC lidocaine patches and/or ointment as needed. Avoid direct skin contact with ice. Ice 15-20 minutes 3-4 times daily as needed. For SI joint pain, recommend ice bath in water about 50 degrees for 5 consecutive days, add ice slowly to help with adjustment and may cover with warm towel or robe to help with cold tolerance. If using lidocaine, do not apply heat in conjunction as this can cause a burn.   MEDICAL RECORDS reviewed from other provider(s) for past and current medical history pertinent to this visit..

## 2025-03-17 ENCOUNTER — TELEPHONE (OUTPATIENT)
Dept: ORTHOPEDIC SURGERY | Facility: CLINIC | Age: 60
End: 2025-03-17

## 2025-03-17 NOTE — TELEPHONE ENCOUNTER
Caller: Maricarmen Hayes    Relationship: Self    Best call back number: [phone] (home) [phone] (work)    What is the medical concern/diagnosis: BACK PAIN    What specialty or service is being requested: PHYSICAL THERAPY    What is the provider, practice or medical service name: [facility]    What is the office location: [address] [place] [address]    What is the office phone number: [phone]    FAX: [phone]

## 2025-04-08 ENCOUNTER — PREP FOR SURGERY (OUTPATIENT)
Dept: SURGERY | Facility: SURGERY CENTER | Age: 60
End: 2025-04-08
Payer: COMMERCIAL

## 2025-04-08 ENCOUNTER — TRANSCRIBE ORDERS (OUTPATIENT)
Dept: SURGERY | Facility: SURGERY CENTER | Age: 60
End: 2025-04-08
Payer: COMMERCIAL

## 2025-04-08 ENCOUNTER — OFFICE VISIT (OUTPATIENT)
Dept: PAIN MEDICINE | Facility: CLINIC | Age: 60
End: 2025-04-08
Payer: COMMERCIAL

## 2025-04-08 VITALS
WEIGHT: 144 LBS | RESPIRATION RATE: 16 BRPM | HEART RATE: 82 BPM | SYSTOLIC BLOOD PRESSURE: 139 MMHG | DIASTOLIC BLOOD PRESSURE: 91 MMHG | TEMPERATURE: 96.8 F | OXYGEN SATURATION: 96 % | HEIGHT: 62 IN | BODY MASS INDEX: 26.5 KG/M2

## 2025-04-08 DIAGNOSIS — M54.16 LUMBAR RADICULOPATHY: Primary | ICD-10-CM

## 2025-04-08 DIAGNOSIS — M47.816 LUMBAR FACET ARTHROPATHY: ICD-10-CM

## 2025-04-08 DIAGNOSIS — Z41.9 SURGERY, ELECTIVE: Primary | ICD-10-CM

## 2025-04-08 DIAGNOSIS — M48.061 SPINAL STENOSIS OF LUMBAR REGION WITHOUT NEUROGENIC CLAUDICATION: ICD-10-CM

## 2025-04-08 PROCEDURE — 99204 OFFICE O/P NEW MOD 45 MIN: CPT | Performed by: PHYSICIAN ASSISTANT

## 2025-04-08 RX ORDER — DIAZEPAM 5 MG/1
10 TABLET ORAL ONCE
OUTPATIENT
Start: 2025-04-08 | End: 2025-04-08

## 2025-04-08 NOTE — PROGRESS NOTES
CHIEF COMPLAINT  Back pain  Patient reports having lower back pain radiating down left leg for a few years.    Subjective   Maricarmen Hayes is a 60 y.o. female.   She presents to the office for initial evaluation of back and leg pain. She was referred here by Yelena Brooks.  The patient has a longstanding history of intermittent flares of low back pain dating over the last 2-3 years.  Over the last several months however she has noted progressive worsening of pain that is beginning to affect her activities of daily living.  On her last office visit with ANGELICA Zambrano on 3/4/2025 majority of her pain was within the lumbar spine.  Since that visit however she has been participating in physical therapy and has noted significant improvement of her back pain however she continues to have significant pain within the lower extremity.  On today her primary pain complaint is left posterior buttock pain which radiates into the lateral aspect of the left lower extremity terminating at the foot with concomitant numbness and tingling affecting the left foot and toes.  Patient significantly greater than her back pain at this time.  Pain is described as an aching and burning alternating with throbbing sensation.  Denies any lower extremity weakness, bladder or bowel incontinence and also denies having saddle anesthesia.    Patient has not had any previous cervical or lumbar spine surgery.  She has met with Dr Krishna Curtis and is considered to be a surgical candidate.    Patient does not have any previous interventional pain management procedures.    Maricarmen is currently attending physical therapy with some improvement particularly noted within the lumbar spine.  She has utilized Tylenol for relief of painful symptoms.    Pain today 4/10 VAS in severity.      Back Pain  This is a chronic problem. The current episode started more than 1 year ago. The problem occurs constantly. The problem is unchanged. The pain is present in the  lumbar spine and sacro-iliac. The quality of the pain is described as aching and burning (throbbing). The pain radiates to the left buttock, left thigh, left anterolateral lower leg and left foot. The pain is at a severity of 4/10. The pain is moderate. The pain is Worse during the day. The symptoms are aggravated by position and standing (walking/activity). Associated symptoms include leg pain, numbness (left foot) and tingling. Pertinent negatives include no weakness. She has tried analgesics and home exercises for the symptoms. The treatment provided mild relief.   Leg Pain   Associated symptoms include numbness (left foot) and tingling.            Current Outpatient Medications:     estradiol (ESTRACE) 0.1 MG/GM vaginal cream, Insert 2 g into the vagina Daily., Disp: , Rfl:     methylPREDNISolone (MEDROL) 4 MG dose pack, Use as directed by package instructions, Disp: 21 tablet, Rfl: 0    Ped Multivitamins-Fl-Iron (Multi-Vitamin/Fluoride/Iron) 0.25-10 MG/ML solution, Take  by mouth., Disp: , Rfl:     rosuvastatin (CRESTOR) 10 MG tablet, TAKE 1 TABLET BY MOUTH DAILY, Disp: 90 tablet, Rfl: 3    trospium 60 MG capsule sustained-release 24 hr capsule, Take 1 capsule by mouth Every Morning., Disp: , Rfl:     The following portions of the patient's history were reviewed and updated as appropriate: allergies, current medications, past family history, past medical history, past social history, past surgical history, and problem list.      REVIEW OF PERTINENT MEDICAL DATA  Reviewed ANGELICA Zambrano orthopedic note dated 3/4/2025: Patient has follow-up of acute on chronic low back pain referring into the left lower extremity.  She has met with Dr Krishna Curtis to discuss surgical options.  She has been identified to have surgical pathology with fairly significant stenosis especially at L4-5.  Results RECOMMENDED L2-L5 decompression and fusion whenever she gets to the point that she cannot tolerate her symptoms anymore.  At  this point she would like to exhaust conservative measures with physical therapy and injections.  We have discussed RFA and MACHO today.  Patient would like to return in 6 months and if she still having flareups of life limiting symptoms if she would like to proceed with surgical intervention at that point we would likely need to update lumbar MRI.  She will call sooner if she develops any red flags or worsening symptoms.      MRI LUMBAR SPINE WITHOUT CONTRAST     HISTORY: Chronic low back pain. Symptoms for 2 years. Fall 2 years ago.     TECHNIQUE: MRI lumbar spine includes sagittal T1, T2 fat-sat, PD as well  as axial T1 weighted sequence angled through the disc spaces and axial  T2 weighted sequence.     COMPARISON: MRI lumbar spine 12/03/2021.     FINDINGS: Conus medullaris tip terminates at mid L2 body level.  Vertebral body heights within normal limits.     At T12-L1, minimal disc bulge without canal or foraminal narrowing.      At L1-2, there is a small Schmorl's node in the superior endplate of L2.  There is a mild disc bulge and there is mild bilateral facet arthritis  without canal or foraminal narrowing.     At L2-3, there is disc space flattening and there are endplate  degenerative changes with T1 hypointense and T2 hyperintense signal.  There is 5 mm retrolisthesis of L2 on L3. Moderate facet arthritis is  present and there is uncovered disc bulge with mild-to-moderate  narrowing of the central canal and moderate-to-severe bilateral  foraminal narrowing.     At L3-4, there is 3 mm retrolisthesis L3 on L4. Uncovered disc bulge is  present and there is moderate bilateral facet arthritis with mild  central canal narrowing. There is diminished foraminal height with  mild-to-moderate bilateral foraminal narrowing.     At L4-5, there is 8 mm anterolisthesis L4 on L5. Uncovered disc bulge is  present and there is advanced bilateral facet arthritis with severe  central canal and lateral recess stenosis.  Potential left pars  interarticularis defect. There is diminished foraminal height greater on  the right with moderate right and mild-to-moderate left foraminal  stenosis. Nerve roots exhibit undulating configuration above and below  this level associated with the degree of foraminal stenosis.     At L5-S1, there is moderate-to-severe left and moderate right facet  arthritis with bony overgrowth. 3 mm retrolisthesis of L5 with respect  to S1. A broad disc bulge is present. There is mild narrowing of the  left lateral recess and there is mild left foraminal narrowing.     IMPRESSION:  1. Multilevel degenerative disc disease and facet arthritis with degree  of canal stenosis greatest at L4-5 where there is severe central canal  and lateral recess stenosis and this appears similar to the prior exam  12/03/2021.  2. Progressive degenerative disc disease at L2-3 when compared to the  prior exam with increased disc space narrowing and endplate degenerative  changes. At this level, there is 5 mm retrolisthesis L2 on L3 and there  is moderate facet arthritis with mild-to-moderate narrowing of the  central canal and moderate-to-severe bilateral foraminal narrowing.     This report was finalized on 11/13/2024 11:00 AM by Storm Vitale M.D  on Workstation: [id number]    Review of Systems   Constitutional:  Negative for activity change.   Gastrointestinal:  Negative for constipation and diarrhea.   Genitourinary:  Negative for difficulty urinating.   Musculoskeletal:  Positive for back pain.   Neurological:  Positive for tingling and numbness (left foot). Negative for weakness.   Psychiatric/Behavioral:  Positive for sleep disturbance. Negative for self-injury and suicidal ideas.        I have reviewed and confirmed the accuracy of the ROS as documented by the MA/DOUGLASN/SARY Kaplan    Vitals:    04/08/25 0956   BP: 139/91   Pulse: 82   Resp: 16   Temp: 96.8 °F (36 °C)   SpO2: 96%   Weight: 65.3 kg (144 lb)   Height:  "157.5 cm (62\")   PainSc: 4          Objective       Physical Exam  Vitals and nursing note reviewed.   Constitutional:       Appearance: Normal appearance. She is normal weight.   HENT:      Head: Normocephalic.   Pulmonary:      Effort: Pulmonary effort is normal.   Musculoskeletal:      Lumbar back: Spasms and tenderness (painful to palpation over the left paraspinal muscles/facet joint spaces) present. Decreased range of motion. Positive left straight leg raise test.        Back:    Skin:     General: Skin is warm and dry.   Neurological:      General: No focal deficit present.      Mental Status: She is alert and oriented to person, place, and time.      Cranial Nerves: Cranial nerves 2-12 are intact.      Sensory: Sensation is intact.      Motor: Motor function is intact.      Gait: Gait abnormal (walks with slight limp favoring LLE).      Deep Tendon Reflexes:      Reflex Scores:       Patellar reflexes are 2+ on the right side and 2+ on the left side.       Achilles reflexes are 2+ on the right side and 2+ on the left side.  Psychiatric:         Mood and Affect: Mood normal.         Behavior: Behavior normal.         Thought Content: Thought content normal.         Judgment: Judgment normal.           PHQ-2 Depression Screening  Little interest or pleasure in doing things? Not at all   Feeling down, depressed, or hopeless? Not at all   PHQ-2 Total Score 0     Tobacco Use: Low Risk  (4/8/2025)    Patient History     Smoking Tobacco Use: Never     Smokeless Tobacco Use: Never     Passive Exposure: Never         Assessment & Plan   Diagnoses and all orders for this visit:    1. Lumbar radiculopathy (Primary)    2. Spinal stenosis of lumbar region without neurogenic claudication    3. Lumbar facet arthropathy        --- Maricarmen Hayes reports a pain score of 4.  Given her pain assessment as noted, treatment options were discussed and the following options were decided upon as a follow-up plan to address the " patient's pain: continuation of current treatment plan for pain, patient not interested in pharmacological measures, steroid injections, and use of non-medical modalities (ice, heat, stretching and/or behavior modifications).    --- Due to patient's report of persistent left lower extremity pain and her MRI findings I will have her return for #1 diagnostic/therapeutic left L4-5 lumbar TFESI under fluoroscopy guidance.  The risk and benefits of the procedure been discussed with the patient and all questions have been addressed.  --- Future consideration would be to proceed with diagnostic lumbar MBB/RFA for axial low back pain  --- Patient may be a candidate in the future for low-dose gabapentin prescribing   --- Plan for follow-up 1 month after undergoing injective therapy      DARRYL REPORT      DARRYL report has been reviewed and scanned into the patient's chart.    As the clinician, I personally reviewed the DARRYL from 4/8/25 while the patient was in the office today.        Dictated utilizing Dragon dictation.

## 2025-04-11 ENCOUNTER — PATIENT ROUNDING (BHMG ONLY) (OUTPATIENT)
Dept: PAIN MEDICINE | Facility: CLINIC | Age: 60
End: 2025-04-11
Payer: COMMERCIAL

## 2025-04-11 NOTE — PROGRESS NOTES
April 11, 2025    Hello, may I speak with Maricarmen Hayes?    My name is Anusha    I am  with [facility]  [facility] [address] [phone].    Before we get started may I verify your date of birth? 1965    I am calling to officially welcome you to our practice and ask about your recent visit. Is this a good time to talk? yes    Tell me about your visit with us. What things went well?  She was very down to earth and I found it easy to talk to her       We're always looking for ways to make our patients' experiences even better. Do you have recommendations on ways we may improve?  no    Overall were you satisfied with your first visit to our practice? yes       I appreciate you taking the time to speak with me today. Is there anything else I can do for you? no      Thank you, and have a great day.

## 2025-04-18 ENCOUNTER — OFFICE VISIT (OUTPATIENT)
Dept: OBSTETRICS AND GYNECOLOGY | Age: 60
End: 2025-04-18
Payer: COMMERCIAL

## 2025-04-18 VITALS
HEIGHT: 62 IN | SYSTOLIC BLOOD PRESSURE: 112 MMHG | WEIGHT: 143 LBS | DIASTOLIC BLOOD PRESSURE: 70 MMHG | BODY MASS INDEX: 26.31 KG/M2

## 2025-04-18 DIAGNOSIS — Z01.419 WELL FEMALE EXAM WITH ROUTINE GYNECOLOGICAL EXAM: Primary | ICD-10-CM

## 2025-04-18 DIAGNOSIS — Z12.4 SCREENING FOR MALIGNANT NEOPLASM OF CERVIX: ICD-10-CM

## 2025-04-18 DIAGNOSIS — Z12.31 SCREENING MAMMOGRAM FOR BREAST CANCER: ICD-10-CM

## 2025-04-18 DIAGNOSIS — Z11.51 SCREENING FOR HUMAN PAPILLOMAVIRUS (HPV): ICD-10-CM

## 2025-04-18 DIAGNOSIS — Z13.89 SCREENING FOR BLOOD OR PROTEIN IN URINE: ICD-10-CM

## 2025-04-18 PROBLEM — R68.82 DECREASED LIBIDO: Status: RESOLVED | Noted: 2019-12-06 | Resolved: 2025-04-18

## 2025-04-18 PROBLEM — Z00.00 ANNUAL PHYSICAL EXAM: Status: RESOLVED | Noted: 2020-07-30 | Resolved: 2025-04-18

## 2025-04-18 PROBLEM — M19.011 OSTEOARTHRITIS OF RIGHT SHOULDER: Status: RESOLVED | Noted: 2019-10-28 | Resolved: 2025-04-18

## 2025-04-18 PROBLEM — Z90.711 S/P LAPAROSCOPIC SUPRACERVICAL HYSTERECTOMY: Status: ACTIVE | Noted: 2025-04-18

## 2025-04-18 LAB
BILIRUB BLD-MCNC: ABNORMAL MG/DL
CLARITY, POC: CLEAR
COLOR UR: YELLOW
GLUCOSE UR STRIP-MCNC: NEGATIVE MG/DL
KETONES UR QL: NEGATIVE
LEUKOCYTE EST, POC: NEGATIVE
NITRITE UR-MCNC: NEGATIVE MG/ML
PH UR: 6 [PH] (ref 5–8)
PROT UR STRIP-MCNC: ABNORMAL MG/DL
RBC # UR STRIP: NEGATIVE /UL
SP GR UR: 1.03 (ref 1–1.03)
UROBILINOGEN UR QL: NORMAL

## 2025-04-18 NOTE — PROGRESS NOTES
[facility]   Obstetrics and Gynecology     2025    Patient: Maricarmen Hayes          MR#:[id number]    History of Present Illness    Chief Complaint   Patient presents with    Gynecologic Exam     Annual, Supracervical hyst . last pap 24 neg, hpv neg, mammo 24 neg  No complaints       60 y.o. female  who presents for annual exam.    The patient presents for her routine annual exam feeling well without complaints.  She had a prior supracervical hysterectomy so Pap is performed today.  The patient has low back problems and has surgery planned for the fall.    Relevant data reviewed:    No LMP recorded (lmp unknown). Patient has had a hysterectomy.  Obstetric History:  OB History          3    Para   2    Term   2            AB   1    Living   2         SAB        IAB        Ectopic   1    Molar        Multiple        Live Births   2               Menstrual History:     No LMP recorded (lmp unknown). Patient has had a hysterectomy.       Social History     Substance and Sexual Activity   Sexual Activity Yes    Partners: Male    Birth control/protection: Surgical    Comment: HYSTERECTOMY       ______________________________________  Patient Active Problem List   Diagnosis    Hyperlipidemia    Osteoarthritis of right shoulder    Hip pain, chronic, right    Chronic right-sided low back pain with sciatica    Spinal stenosis of lumbar region without neurogenic claudication    Osteopenia, senile    Lumbar radiculopathy    Lumbar facet arthropathy     Past Medical History:   Diagnosis Date    Abnormal Pap smear of cervix     Anemia     Fibrocystic breast     Fractures     Broken arm    Hyperlipidemia     Joint pain     Knee    Multiple gestation     Osteopenia     S/P reverse total shoulder arthroplasty, right 2020    Spinal stenosis     Lower back    Varicella      Past Surgical History:   Procedure Laterality Date    BILATERAL SALPINGO  OOPHORECTOMY  2015    Dr. Reynolds    BLEPHAROPLASTY Bilateral 2019    Procedure: BLEPHAROPLASTY;  Surgeon: Kye Guerrero MD;  Location: [facility] MAIN OR;  Service: Plastics    BROW LIFT AND BLEPHAROPLASTY Bilateral 2019    Procedure: BROW LIFT AND  BILATERAL UPPER LID BLEPHAROPLASTY;  Surgeon: Kye Guerrero MD;  Location: [facility] MAIN OR;  Service: Plastics     SECTION  1984     SECTION WITH TUBAL      COLONOSCOPY N/A 2016    Procedure: COLONOSCOPY;  Surgeon: Devon Campos MD;  Location: [facility] ENDOSCOPY;  Service:     EYE SURGERY      Eye lids    OOPHORECTOMY      ORTHOPEDIC SURGERY      [facility] Shoulder replacement    SHOULDER SURGERY      SUPRACERVICAL HYSTERECTOMY SALPINGO OOPHORECTOMY  2015    supracervical hyst/ [facility]     TOTAL SHOULDER ARTHROPLASTY W/ DISTAL CLAVICLE EXCISION Right 2020    Procedure: RIGHT TOTAL SHOULDER REVERSE ARTHROPLASTY WITH DISTAL CLAVICLE EXCISION;  Surgeon: Humberto Up MD;  [place]  [facility] [place];  Service: Orthopedics;  Laterality: Right;    TUBAL ABDOMINAL LIGATION       Social History     Tobacco Use   Smoking Status Never    Passive exposure: Never   Smokeless Tobacco Never     Family History   Problem Relation Age of Onset    Hyperlipidemia Mother     Ovarian cancer Mother     Rheum arthritis Mother     Arthritis Mother     Cancer Mother         Ovarian    Arthritis Father     Hyperlipidemia Maternal Grandmother     Alzheimer's disease Paternal Grandmother     Rheum arthritis Maternal Aunt     Hyperlipidemia Maternal Aunt     Ovarian cancer Maternal Aunt     Hyperlipidemia Maternal Aunt     Ovarian cancer Maternal Aunt     Malig Hyperthermia Neg Hx     Breast cancer Neg Hx      Prior to Admission medications    Medication Sig Start Date End Date Taking? Authorizing Provider   estradiol (ESTRACE) 0.1 MG/GM vaginal cream Insert 2 g into the vagina Daily.   Yes Provider, MD Shante  "  methylPREDNISolone (MEDROL) 4 MG dose pack Use as directed by package instructions 3/4/25  Yes Yelena Brooks APRN Ped Multivitamins-Fl-Iron (Multi-Vitamin/Fluoride/Iron) 0.25-10 MG/ML solution Take  by mouth.   Yes Shante Collazo MD   rosuvastatin (CRESTOR) 10 MG tablet TAKE 1 TABLET BY MOUTH DAILY 2/24/25  Yes Sharron Nash MD   trospium 60 MG capsule sustained-release 24 hr capsule Take 1 capsule by mouth Every Morning. 11/7/24  Yes ProviderShante MD     _______________________________________    Current contraception:  supracervical hyst  History of abnormal Pap smear: no  Family history of uterine or ovarian cancer: no  Family History of colon cancer/colon polyps: no  History of abnormal mammogram: no  History of abnormal lipids: yes - On statin    The following portions of the patient's history were reviewed and updated as appropriate: allergies, current medications, past family history, past medical history, past social history, past surgical history, and problem list.    Review of Systems    Pertinent items are noted in HPI.       Objective   Physical Exam    /70   Ht 157.5 cm (62\")   Wt 64.9 kg (143 lb)   LMP  (LMP Unknown)   BMI 26.16 kg/m²    BP Readings from Last 3 Encounters:   04/18/25 112/70   04/08/25 139/91   11/22/24 118/76      Wt Readings from Last 3 Encounters:   04/18/25 64.9 kg (143 lb)   04/08/25 65.3 kg (144 lb)   03/04/25 63 kg (139 lb)        BMI: Estimated body mass index is 26.16 kg/m² as calculated from the following:    Height as of this encounter: 157.5 cm (62\").    Weight as of this encounter: 64.9 kg (143 lb).       General: alert, appears stated age, and cooperative   Heart: regular rate and rhythm, S1, S2 normal, no murmur, click, rub or gallop   Lungs: clear to auscultation bilaterally   Abdomen: soft, non-tender, without masses, no organomegaly   Breast: inspection negative, no nipple discharge or bleeding, no masses or nodularity palpable "   External genitalia/Vulva: External genitalia including bartholin's glands, Urethra, Purcell's gland and urethra meatus are normal, Perineum, rectum and anus appear normal , and Bladder appears normal without significant prolapse    Vagina: normal mucosa, normal discharge   Cervix: no lesions   Uterus: absent    Adnexa: normal adnexa     As part of wellness and prevention, the following topics were discussed with the patient:  Encouraged self breast exam  Physical activity and regular exercised encouraged.         Problem List   Meds  History  Prep for Surg   Imagin}    Assessment:     Well female exam with routine gynecological exam    Orders:    IGP, Apt HPV,rfx 16 / 18,45    Screening for human papillomavirus (HPV)    Orders:    IGP, Apt HPV,rfx 16 / 18,45    Screening for malignant neoplasm of cervix    Orders:    IGP, Apt HPV,rfx 16 / 18,45    Screening mammogram for breast cancer    Orders:    Mammo Screening Digital Tomosynthesis Bilateral With CAD; Future    Screening for blood or protein in urine    Orders:    POC Urinalysis Dipstick    Plan:  Return in 1 year (on 2026) for Annual exam.    Vern Reynolds MD  2025 08:46 EDT

## 2025-04-22 LAB
CYTOLOGIST CVX/VAG CYTO: NORMAL
CYTOLOGY CVX/VAG DOC CYTO: NORMAL
CYTOLOGY CVX/VAG DOC THIN PREP: NORMAL
DX ICD CODE: NORMAL
HPV I/H RISK 4 DNA CVX QL PROBE+SIG AMP: NEGATIVE
OTHER STN SPEC: NORMAL
SERVICE CMNT-IMP: NORMAL
STAT OF ADQ CVX/VAG CYTO-IMP: NORMAL

## 2025-04-28 NOTE — DISCHARGE INSTRUCTIONS
[facility] - Post-procedure Instructions          --  While there are no absolute restrictions, it is recommended that you do not perform strenuous activity today. In the morning, you may resume your level of activity as before your block.    --  If you have a band-aid at your injection site, please remove it later today. Observe the area for any redness, swelling, pus-like drainage, or a temperature over 101°. If any of these symptoms occur, please call your doctor at [phone]. If after office hours, leave a message and the on-call provider will return your call.    --  Ice may be applied to your injection site. It is recommended you avoid direct heat (heating pad; hot tub) for 1-2 days.    --  Call [facility] at [phone] if you experience persistent headache, persistent bleeding from the injection site, or severe pain not relieved by heat or oral medication.    --  Do not make important decisions today.    --  Due to the effects of the block and/or the I.V. Sedation, DO NOT drive or operate hazardous machinery for 12 hours.  Local anesthetics may cause numbness after procedure and precautions must be taken with regards to operating equipment as well as with walking, even if ambulating with assistance of another person or with an assistive device.    --  Do not drink alcohol for 12 hours.    -- You may return to work tomorrow, or as directed by your referring doctor.    --  Occasionally you may notice a slight increase in your pain after the procedure. This should start to improve within the next 24-48 hours. Radiofrequency ablation procedure pain may last 3-4 weeks.    --  It may take as long as 3-4 days before you notice a gradual improvement in your pain and/or other symptoms.    -- You may continue to take your prescribed pain medication as needed.    --  Some normal possible side effects of steroid use could include fluid retention, increased blood sugar, dull headache,  increased sweating, increased appetite, mood swings and flushing.    --  Diabetics are recommended to watch their blood glucose level closely for 24-48 hours after the injection.    --  Must stay in PACU for 20 min upon arrival and prove no leg weakness before being discharged.    --  IN THE EVENT OF A LIFE THREATENING EMERGENCY, (CHEST PAIN, BREATHING DIFFICULTIES, PARALYSIS…) YOU SHOULD GO TO YOUR NEAREST EMERGENCY ROOM.    --  You should be contacted by our office within 2-3 days to schedule follow up or next appointment date.  If not contacted within 7 days, please call the office at [phone]

## 2025-04-30 ENCOUNTER — HOSPITAL ENCOUNTER (OUTPATIENT)
Facility: SURGERY CENTER | Age: 60
Setting detail: HOSPITAL OUTPATIENT SURGERY
Discharge: HOME | End: 2025-04-30
Attending: ANESTHESIOLOGY | Admitting: ANESTHESIOLOGY
Payer: COMMERCIAL

## 2025-04-30 ENCOUNTER — HOSPITAL ENCOUNTER (OUTPATIENT)
Dept: GENERAL RADIOLOGY | Facility: SURGERY CENTER | Age: 60
Setting detail: HOSPITAL OUTPATIENT SURGERY
End: 2025-04-30
Payer: COMMERCIAL

## 2025-04-30 VITALS
SYSTOLIC BLOOD PRESSURE: 122 MMHG | OXYGEN SATURATION: 97 % | BODY MASS INDEX: 25.58 KG/M2 | RESPIRATION RATE: 16 BRPM | HEIGHT: 62 IN | TEMPERATURE: 97.8 F | DIASTOLIC BLOOD PRESSURE: 96 MMHG | WEIGHT: 139 LBS | HEART RATE: 74 BPM

## 2025-04-30 DIAGNOSIS — Z41.9 SURGERY, ELECTIVE: ICD-10-CM

## 2025-04-30 DIAGNOSIS — M54.16 LUMBAR RADICULOPATHY: ICD-10-CM

## 2025-04-30 PROCEDURE — 25010000002 LIDOCAINE PF 1% 1 % SOLUTION 5 ML VIAL: Performed by: ANESTHESIOLOGY

## 2025-04-30 PROCEDURE — 25010000002 BUPIVACAINE (PF) 0.25 % SOLUTION 10 ML VIAL: Performed by: ANESTHESIOLOGY

## 2025-04-30 PROCEDURE — 64483 NJX AA&/STRD TFRM EPI L/S 1: CPT | Performed by: ANESTHESIOLOGY

## 2025-04-30 PROCEDURE — 77002 NEEDLE LOCALIZATION BY XRAY: CPT

## 2025-04-30 PROCEDURE — 25510000001 IOPAMIDOL 61 % SOLUTION 30 ML VIAL: Performed by: ANESTHESIOLOGY

## 2025-04-30 PROCEDURE — 25010000002 DEXAMETHASONE SODIUM PHOSPHATE 100 MG/10ML SOLUTION 10 ML VIAL: Performed by: ANESTHESIOLOGY

## 2025-04-30 RX ORDER — DIAZEPAM 5 MG/1
10 TABLET ORAL ONCE
Status: COMPLETED | OUTPATIENT
Start: 2025-04-30 | End: 2025-04-30

## 2025-04-30 RX ADMIN — DIAZEPAM 10 MG: 5 TABLET ORAL at 08:39

## 2025-04-30 NOTE — OP NOTE
Lumbar Transforaminal Epidural Steroid Injection Left L4-5 Levels  [facility]    PREOPERATIVE DIAGNOSIS:  Lumbar radicular pain, Lumbar Radiculopathy    POSTOPERATIVE DIAGNOSIS:  Same as preop diagnosis    PROCEDURE:    1. CPT 24105 --  Diagnostic & Therapeutic Transforaminal Epidural Steroid Injection at the L4 level, on the left     PRE-PROCEDURE DISCUSSION WITH PATIENT:    Risks and complications were discussed with the patient prior to starting the procedure and informed consent was obtained.  We discussed various topics including but not limited to bleeding, infection, injury, nerve injury, paralysis, coma, death, postprocedural painful flare-up, postprocedural site soreness, and a lack of pain relief.  We discussed the diagnostic aspect of transforaminal epidural / selective nerve root blockade.    SURGEON:  Donna Mcfarland MD    SEDATION:  Anxiolysis was used for this procedure which included PO Valium 10mg  ANESTHETIC:  0.25% bupivacaine  STEROID:  10mg dexamethasone    DESCRIPTON OF PROCEDURE:  After obtaining informed consent, an I.V. was not started in the preoperative area. The patient taken to the operating room and was placed in the prone position with a pillow under the abdomen.  All pressure points were well padded.  Heart rate, blood pressure, and pulse oximeter were monitored.  The lumbar area was prepped with Chloraprep and draped in a sterile fashion.     The AP fluoroscopic image was used to visualize the L4 vertebral body.  The superior endplate was squared off radiographically.  The image was then obliqued towards the patient's left side to maximize visualization of the pedicle. The skin and subcutaneous tissue at the 6 o'clock position of the pedicle was anesthetized with 1% lidocaine. A 22-gauge spinal needle was then advanced percutaneously through the anesthetized skin tract under fluoroscopic guidance in AP and lateral views until the needle tip lie in the  alejandro-lateral aspect of the epidural space.  After negative aspiration of the needle for blood or CSF, a volume of 1 mL of Isovue was injected producing good epidural spread with no evidence of loculation, vascular run-off, or intrathecal spread. Subsequently, a volume of 3 mL of injectate was administered without resistance.  The needle was removed intact.  Vital signs were stable throughout.      The total volume injected consisted of 10 mg of dexamethasone with 0.5 mL of 0.25% bupivacaine and preservative-free normal saline.       ESTIMATED BLOOD LOSS:  <5 mL  SPECIMENS:  none    COMPLICATIONS:   No complications were noted., There was no indication of vascular uptake on live injection of contrast dye., and There was no indication of intrathecal uptake on live injection of contrast dye.    TOLERANCE & DISCHARGE CONDITION:    The patient tolerated the procedure well.  The patient was transported to the recovery area without difficulties.  The patient was discharged to home under the care of family in stable and satisfactory condition.    PLAN OF CARE:  The patient was given our standard instruction sheet.  The patient will Return to clinic 4-6 wks.  The patient will resume all medications as per the medication reconciliation sheet.

## 2025-05-22 ENCOUNTER — OFFICE VISIT (OUTPATIENT)
Dept: PAIN MEDICINE | Facility: CLINIC | Age: 60
End: 2025-05-22
Payer: COMMERCIAL

## 2025-05-22 VITALS
HEIGHT: 62 IN | DIASTOLIC BLOOD PRESSURE: 91 MMHG | TEMPERATURE: 97.1 F | RESPIRATION RATE: 18 BRPM | WEIGHT: 139.2 LBS | SYSTOLIC BLOOD PRESSURE: 150 MMHG | BODY MASS INDEX: 25.62 KG/M2 | HEART RATE: 77 BPM | OXYGEN SATURATION: 98 %

## 2025-05-22 DIAGNOSIS — M48.061 SPINAL STENOSIS OF LUMBAR REGION WITHOUT NEUROGENIC CLAUDICATION: ICD-10-CM

## 2025-05-22 DIAGNOSIS — M54.16 LUMBAR RADICULOPATHY: Primary | ICD-10-CM

## 2025-05-22 DIAGNOSIS — M47.816 LUMBAR FACET ARTHROPATHY: ICD-10-CM

## 2025-05-22 PROCEDURE — 99214 OFFICE O/P EST MOD 30 MIN: CPT | Performed by: PHYSICIAN ASSISTANT

## 2025-05-22 RX ORDER — GABAPENTIN 300 MG/1
CAPSULE ORAL
Qty: 60 CAPSULE | Refills: 0 | Status: SHIPPED | OUTPATIENT
Start: 2025-05-22

## 2025-05-22 NOTE — PROGRESS NOTES
CHIEF COMPLAINT  LUMBAR/SACRAL TRANSFORAMINAL EPIDURAL LIKELY AT LEFT L4-5   Patient reports she got 90% pain relief after injection. She has been doing more activity and has noticed her left leg being sore.      Subjective   Maricarmen Hayes is a 60 y.o. female  who presents to the office for follow-up of procedure.  She completed a #1 left L4-5 lumbar TFESI   on 4/30/2025 performed by Dr. Mcfarland for management of low back and left leg pain. Patient reports greater than 90% ongoing relief from the procedure.  She is pleased to report complete resolution of numbness and tingling that was affecting the left lower extremity and states that now she just has more of a sore sensation in the left leg that usually occurs after increased activity. Of her current pain relief following injective therapy.  Does still experience some discomfort and difficulty falling asleep at night.    This patient has had neurosurgical evaluation with Dr Krishna Curtis and is considered a surgical candidate however she is hoping to defer surgery for as long as possible.    Pain today 2/10 VAS in severity.        Back Pain  Chronicity:  Chronic  Onset:  More than 1 year ago  Frequency:  Constantly  Progression since onset:  Unchanged  Pain location:  Lumbar spine and sacro-iliac  Pain quality:  Aching and burning (throbbing)  Radiates to:  Left buttock, left thigh, left anterolateral lower leg and left foot  Pain-numeric:  2/10  Pain severity:  Mild  Pain is:  Worse during the day  Aggravated by:  Position and standing (walking/activity)  Associated symptoms: weakness (left leg)    Associated symptoms: no fever and no headaches    Treatments tried:  Analgesics and home exercises  Improvement on treatment:  Mild       PEG Assessment   What number best describes your pain on average in the past week?4  What number best describes how, during the past week, pain has interfered with your enjoyment of life?3  What number best describes how, during the  past week, pain has interfered with your general activity?  3    Review of Pertinent Medical Data ---  MRI LUMBAR SPINE WITHOUT CONTRAST     HISTORY: Chronic low back pain. Symptoms for 2 years. Fall 2 years ago.     TECHNIQUE: MRI lumbar spine includes sagittal T1, T2 fat-sat, PD as well  as axial T1 weighted sequence angled through the disc spaces and axial  T2 weighted sequence.     COMPARISON: MRI lumbar spine 12/03/2021.     FINDINGS: Conus medullaris tip terminates at mid L2 body level.  Vertebral body heights within normal limits.     At T12-L1, minimal disc bulge without canal or foraminal narrowing.      At L1-2, there is a small Schmorl's node in the superior endplate of L2.  There is a mild disc bulge and there is mild bilateral facet arthritis  without canal or foraminal narrowing.     At L2-3, there is disc space flattening and there are endplate  degenerative changes with T1 hypointense and T2 hyperintense signal.  There is 5 mm retrolisthesis of L2 on L3. Moderate facet arthritis is  present and there is uncovered disc bulge with mild-to-moderate  narrowing of the central canal and moderate-to-severe bilateral  foraminal narrowing.     At L3-4, there is 3 mm retrolisthesis L3 on L4. Uncovered disc bulge is  present and there is moderate bilateral facet arthritis with mild  central canal narrowing. There is diminished foraminal height with  mild-to-moderate bilateral foraminal narrowing.     At L4-5, there is 8 mm anterolisthesis L4 on L5. Uncovered disc bulge is  present and there is advanced bilateral facet arthritis with severe  central canal and lateral recess stenosis. Potential left pars  interarticularis defect. There is diminished foraminal height greater on  the right with moderate right and mild-to-moderate left foraminal  stenosis. Nerve roots exhibit undulating configuration above and below  this level associated with the degree of foraminal stenosis.     At L5-S1, there is  moderate-to-severe left and moderate right facet  arthritis with bony overgrowth. 3 mm retrolisthesis of L5 with respect  to S1. A broad disc bulge is present. There is mild narrowing of the  left lateral recess and there is mild left foraminal narrowing.     IMPRESSION:  1. Multilevel degenerative disc disease and facet arthritis with degree  of canal stenosis greatest at L4-5 where there is severe central canal  and lateral recess stenosis and this appears similar to the prior exam  12/03/2021.  2. Progressive degenerative disc disease at L2-3 when compared to the  prior exam with increased disc space narrowing and endplate degenerative  changes. At this level, there is 5 mm retrolisthesis L2 on L3 and there  is moderate facet arthritis with mild-to-moderate narrowing of the  central canal and moderate-to-severe bilateral foraminal narrowing.     This report was finalized on 11/13/2024 11:00 AM by Storm Vitale M.D  on Workstation: [id number]    The following portions of the patient's history were reviewed and updated as appropriate: allergies, current medications, past family history, past medical history, past social history, past surgical history, and problem list.    Review of Systems   Constitutional:  Negative for activity change (increased) and fever.   Gastrointestinal:  Negative for diarrhea and nausea.   Musculoskeletal:  Positive for back pain.   Neurological:  Positive for weakness (left leg). Negative for dizziness, light-headedness and headaches.   Psychiatric/Behavioral:  Positive for sleep disturbance (occ). Negative for agitation and suicidal ideas. The patient is not nervous/anxious.      I have reviewed and confirmed the accuracy of the ROS as documented by the MA/LPN/RN SARY Ferrer   Vitals:    05/22/25 0821   BP: 150/91   BP Location: Left arm   Patient Position: Sitting   Cuff Size: Adult   Pulse: 77   Resp: 18   Temp: 97.1 °F (36.2 °C)   TempSrc: Temporal   SpO2: 98%   Weight:  "63.1 kg (139 lb 3.2 oz)   Height: 157.5 cm (62\")   PainSc: 2          Objective   Physical Exam  Vitals and nursing note reviewed.   Constitutional:       Appearance: Normal appearance. She is normal weight.   HENT:      Head: Normocephalic.   Neurological:      Mental Status: She is alert and oriented to person, place, and time.      Cranial Nerves: Cranial nerves 2-12 are intact.      Sensory: Sensation is intact.      Motor: Motor function is intact.      Gait: Gait is intact.   Psychiatric:         Mood and Affect: Mood normal.         Behavior: Behavior normal.         Thought Content: Thought content normal.         Judgment: Judgment normal.         Assessment & Plan   Diagnoses and all orders for this visit:    1. Lumbar radiculopathy (Primary)  -     gabapentin (NEURONTIN) 300 MG capsule; Take 1 or 2 capsules PO QHS as tolerated  Dispense: 60 capsule; Refill: 0    2. Spinal stenosis of lumbar region without neurogenic claudication  -     gabapentin (NEURONTIN) 300 MG capsule; Take 1 or 2 capsules PO QHS as tolerated  Dispense: 60 capsule; Refill: 0    3. Lumbar facet arthropathy        Maricarmen Phyllis Hayes reports a pain score of 2.  Given her pain assessment as noted, treatment options were discussed and the following options were decided upon as a follow-up plan to address the patient's pain: continuation of current treatment plan for pain, prescription for non-opiod analgesics, and use of non-medical modalities (ice, heat, stretching and/or behavior modifications).    PHQ-2 Depression Screening  Little interest or pleasure in doing things? Not at all   Feeling down, depressed, or hopeless? Not at all   PHQ-2 Total Score 0      Tobacco Use: Low Risk  (5/22/2025)    Patient History     Smoking Tobacco Use: Never     Smokeless Tobacco Use: Never     Passive Exposure: Never         --- Follow-up 6 weeks or sooner for further evaluation and treat recommendations to be made  --- Will initiate a trial of " gabapentin 300 mg 1 or 2 p.o. nightly           DARRYL REPORT  As part of the patient's treatment plan, I am prescribing controlled substances. The patient has been made aware of appropriate use of such medications, including potential risk of somnolence, limited ability to drive and/or work safely, and the potential for dependence or overdose. It has also been made clear that these medications are for use by this patient only, without concomitant use of alcohol or other substances unless prescribed.     Patient has completed prescribing agreement detailing terms of continued prescribing of controlled substances, including monitoring DARRYL reports, urine drug screening, and pill counts if necessary. The patient is aware that inappropriate use will results in cessation of prescribing such medications.    As the clinician, I personally reviewed the DARRYL from 5/22/2025 while the patient was in the office today.    History and physical exam exhibit continued safe and appropriate use of controlled substances.           Dictated utilizing Dragon dictation.

## 2025-06-18 ENCOUNTER — HOSPITAL ENCOUNTER (OUTPATIENT)
Dept: MAMMOGRAPHY | Facility: HOSPITAL | Age: 60
Discharge: HOME | End: 2025-06-18
Admitting: OBSTETRICS & GYNECOLOGY
Payer: COMMERCIAL

## 2025-06-18 DIAGNOSIS — Z12.31 SCREENING MAMMOGRAM FOR BREAST CANCER: ICD-10-CM

## 2025-06-18 PROCEDURE — 77067 SCR MAMMO BI INCL CAD: CPT

## 2025-06-18 PROCEDURE — 77063 BREAST TOMOSYNTHESIS BI: CPT

## 2025-07-02 ENCOUNTER — TRANSCRIBE ORDERS (OUTPATIENT)
Dept: SURGERY | Facility: SURGERY CENTER | Age: 60
End: 2025-07-02
Payer: COMMERCIAL

## 2025-07-02 ENCOUNTER — OFFICE VISIT (OUTPATIENT)
Dept: PAIN MEDICINE | Facility: CLINIC | Age: 60
End: 2025-07-02
Payer: COMMERCIAL

## 2025-07-02 ENCOUNTER — PREP FOR SURGERY (OUTPATIENT)
Dept: SURGERY | Facility: SURGERY CENTER | Age: 60
End: 2025-07-02
Payer: COMMERCIAL

## 2025-07-02 VITALS
RESPIRATION RATE: 18 BRPM | TEMPERATURE: 98 F | WEIGHT: 140.6 LBS | BODY MASS INDEX: 25.88 KG/M2 | HEIGHT: 62 IN | SYSTOLIC BLOOD PRESSURE: 139 MMHG | OXYGEN SATURATION: 96 % | HEART RATE: 70 BPM | DIASTOLIC BLOOD PRESSURE: 90 MMHG

## 2025-07-02 DIAGNOSIS — M48.061 SPINAL STENOSIS OF LUMBAR REGION WITHOUT NEUROGENIC CLAUDICATION: ICD-10-CM

## 2025-07-02 DIAGNOSIS — Z41.9 SURGERY, ELECTIVE: Primary | ICD-10-CM

## 2025-07-02 DIAGNOSIS — M54.16 LUMBAR RADICULOPATHY: ICD-10-CM

## 2025-07-02 DIAGNOSIS — M54.16 LUMBAR RADICULOPATHY: Primary | ICD-10-CM

## 2025-07-02 PROCEDURE — 99214 OFFICE O/P EST MOD 30 MIN: CPT | Performed by: PHYSICIAN ASSISTANT

## 2025-07-02 RX ORDER — GABAPENTIN 300 MG/1
CAPSULE ORAL
Qty: 60 CAPSULE | Refills: 0 | Status: SHIPPED | OUTPATIENT
Start: 2025-07-02

## 2025-07-02 RX ORDER — DIAZEPAM 5 MG/1
10 TABLET ORAL ONCE
OUTPATIENT
Start: 2025-07-02 | End: 2025-07-02

## 2025-07-02 NOTE — PROGRESS NOTES
CHIEF COMPLAINT  Follow-up for back pain.      Subjective   Maricarmen Hayes is a 60 y.o. female  who presents for follow-up.  She has a history of back and leg pain.  The patient is still obtaining over 75% reduction of low back pain but is beginning to have slight recrudescence of left lower extremity pain.  She illustrates pain along the lateral aspect of the left leg particularly localized near the lateral aspect of the calf radiating to the ankle.  Also beginning to have slight recrudescence of numbness and tingling within the left lower extremity particular within the toes.    Her current medication consists of gabapentin 300 mg 1-2 p.o. nightly as tolerated.  This medication is helpful with decrease in neuropathic pain as well as helping with sleep.    She has decided to move forth with surgery with Dr Krishna Curtis and will be scheduled for MRI in September.    Pain today 2/10 VAS in severity.      Back Pain  Chronicity:  Chronic  Onset:  More than 1 year ago  Frequency:  Constantly  Progression since onset:  Worsening  Pain location:  Lumbar spine and sacro-iliac  Pain quality:  Aching and burning (throbbing)  Radiates to:  Left buttock, left thigh, left anterolateral lower leg and left foot  Pain-numeric:  2/10  Pain severity:  Mild  Pain is:  Worse during the day  Aggravated by:  Position and standing (walking/activity)  Associated symptoms: numbness (left foot)    Associated symptoms: no fever, no headaches and no weakness    Treatments tried:  Analgesics and home exercises  Improvement on treatment:  Mild       PEG Assessment   What number best describes your pain on average in the past week?4  What number best describes how, during the past week, pain has interfered with your enjoyment of life?0  What number best describes how, during the past week, pain has interfered with your general activity?  0    Review of Pertinent Medical Data ---  No new imaging for review today    The following portions of  "the patient's history were reviewed and updated as appropriate: allergies, current medications, past family history, past medical history, past social history, past surgical history, and problem list.    Review of Systems   Constitutional:  Negative for fever.   Gastrointestinal:  Negative for constipation and diarrhea.   Genitourinary:  Negative for difficulty urinating.   Musculoskeletal:  Positive for back pain.   Neurological:  Positive for numbness (left foot). Negative for weakness and headaches.   Psychiatric/Behavioral:  Negative for sleep disturbance and suicidal ideas. The patient is not nervous/anxious.      I have reviewed and confirmed the accuracy of the ROS as documented by the MA/LPN/RN SARY Ferrer  Vitals:    07/02/25 0835   BP: 139/90   Pulse: 70   Resp: 18   Temp: 98 °F (36.7 °C)   SpO2: 96%   Weight: 63.8 kg (140 lb 9.6 oz)   Height: 157.5 cm (62\")   PainSc: 2    PainLoc: Back         Objective   Physical Exam  Vitals and nursing note reviewed.   Constitutional:       Appearance: Normal appearance. She is normal weight.   HENT:      Head: Normocephalic.   Pulmonary:      Effort: Pulmonary effort is normal.   Musculoskeletal:      Lumbar back: Spasms and tenderness (painful to palpation over the left paraspinal muscles/facet joint spaces) present. Decreased range of motion. Positive left straight leg raise test (Mildly positive to the knee only).        Back:    Skin:     General: Skin is warm and dry.   Neurological:      General: No focal deficit present.      Mental Status: She is alert and oriented to person, place, and time.      Cranial Nerves: Cranial nerves 2-12 are intact.      Sensory: Sensation is intact.      Motor: Motor function is intact.      Gait: Gait abnormal (walks with slight limp favoring LLE).      Deep Tendon Reflexes:      Reflex Scores:       Patellar reflexes are 2+ on the right side and 2+ on the left side.       Achilles reflexes are 2+ on the right side and 2+ " on the left side.  Psychiatric:         Mood and Affect: Mood normal.         Behavior: Behavior normal.         Thought Content: Thought content normal.         Judgment: Judgment normal.         Assessment & Plan   Diagnoses and all orders for this visit:    1. Lumbar radiculopathy  -     gabapentin (NEURONTIN) 300 MG capsule; Take 1 or 2 capsules PO QHS as tolerated  Dispense: 60 capsule; Refill: 0    2. Spinal stenosis of lumbar region without neurogenic claudication  -     gabapentin (NEURONTIN) 300 MG capsule; Take 1 or 2 capsules PO QHS as tolerated  Dispense: 60 capsule; Refill: 0        Maricarmen Hayes reports a pain score of 2.  Given her pain assessment as noted, treatment options were discussed and the following options were decided upon as a follow-up plan to address the patient's pain: continuation of current treatment plan for pain, prescription for non-opiod analgesics, steroid injections, and use of non-medical modalities (ice, heat, stretching and/or behavior modifications).    PHQ-2 Depression Screening  Little interest or pleasure in doing things? Not at all   Feeling down, depressed, or hopeless? Not at all   PHQ-2 Total Score 0     Tobacco Use: Low Risk  (7/2/2025)    Patient History     Smoking Tobacco Use: Never     Smokeless Tobacco Use: Never     Passive Exposure: Never         --- I will have the patient return for left L4-5, 5-S1 lumbar TFESI under fluoroscopy guidance.  --- Refill gabapentin 300 mg.  --- Follow-up 6 weeks after undergoing injective therapy      ---  Indications for epidural injection:  Plan is to proceed with epidural at the appropriate level.  If the patient receives significant pain reduction and improvement in function and the plan will be to repeat the epidural when the pain worsens.  If a second epidural provides at least 6 weeks of sustained improvement that includes both pain reduction and improvement in function then an epidural injection could be repeated  once again at the same level.  This is a mutual decision between the clinician and the patient that includes discussions including risks and benefits in detail as well as alternative therapies.  Patient's questions were answered to their satisfaction and to their understanding.  ---  Discussed with the patient that sedation is optional for this procedure.  The sedation offered is called conscious sedation which is different from general anesthesia that is utilized in surgical procedures. The dosing of the sedation is determined by the physician and they will be monitored throughout the procedure. With conscious sedation it is possible to remember parts or all of the procedure, this is normal. They will need to have a  with them as driving is prohibited following conscious sedation.     NPO instructions for conscious sedation:  --- Do not eat 8 hours prior to the procedure.   --- Do not drink any dairy or citrus 4 hours prior to the procedure.   --- Do not drink anything, including clear liquids, 2 hours prior to procedure.     If the NPO instructions are not followed then the procedure may be performed without sedation or the procedure will need to be rescheduled.              DARRYL REPORT  As part of the patient's treatment plan, I am prescribing controlled substances. The patient has been made aware of appropriate use of such medications, including potential risk of somnolence, limited ability to drive and/or work safely, and the potential for dependence or overdose. It has also been made clear that these medications are for use by this patient only, without concomitant use of alcohol or other substances unless prescribed.     Patient has completed prescribing agreement detailing terms of continued prescribing of controlled substances, including monitoring DARRYL reports, urine drug screening, and pill counts if necessary. The patient is aware that inappropriate use will results in cessation of prescribing  such medications.    As the clinician, I personally reviewed the DARRYL from 7/2/2025 while the patient was in the office today.    History and physical exam exhibit continued safe and appropriate use of controlled substances.     Dictated utilizing Dragon dictation.

## 2025-07-30 NOTE — DISCHARGE INSTRUCTIONS
[facility] - Post-procedure Instructions          --  While there are no absolute restrictions, it is recommended that you do not perform strenuous activity today. In the morning, you may resume your level of activity as before your block.    --  If you have a band-aid at your injection site, please remove it later today. Observe the area for any redness, swelling, pus-like drainage, or a temperature over 101°. If any of these symptoms occur, please call your doctor at [phone]. If after office hours, leave a message and the on-call provider will return your call.    --  Ice may be applied to your injection site. It is recommended you avoid direct heat (heating pad; hot tub) for 1-2 days.    --  Call [facility] at [phone] if you experience persistent headache, persistent bleeding from the injection site, or severe pain not relieved by heat or oral medication.    --  Do not make important decisions today.    --  Due to the effects of the block and/or the I.V. Sedation, DO NOT drive or operate hazardous machinery for 12 hours.  Local anesthetics may cause numbness after procedure and precautions must be taken with regards to operating equipment as well as with walking, even if ambulating with assistance of another person or with an assistive device.    --  Do not drink alcohol for 12 hours.    -- You may return to work tomorrow, or as directed by your referring doctor.    --  Occasionally you may notice a slight increase in your pain after the procedure. This should start to improve within the next 24-48 hours. Radiofrequency ablation procedure pain may last 3-4 weeks.    --  It may take as long as 3-4 days before you notice a gradual improvement in your pain and/or other symptoms.    -- You may continue to take your prescribed pain medication as needed.    --  Some normal possible side effects of steroid use could include fluid retention, increased blood sugar, dull headache,  increased sweating, increased appetite, mood swings and flushing.    --  Diabetics are recommended to watch their blood glucose level closely for 24-48 hours after the injection.    --  Must stay in PACU for 20 min upon arrival and prove no leg weakness before being discharged.    --  IN THE EVENT OF A LIFE THREATENING EMERGENCY, (CHEST PAIN, BREATHING DIFFICULTIES, PARALYSIS…) YOU SHOULD GO TO YOUR NEAREST EMERGENCY ROOM.    --  You should be contacted by our office within 2-3 days to schedule follow up or next appointment date.  If not contacted within 7 days, please call the office at [phone]

## 2025-08-01 ENCOUNTER — HOSPITAL ENCOUNTER (OUTPATIENT)
Dept: GENERAL RADIOLOGY | Facility: SURGERY CENTER | Age: 60
Setting detail: HOSPITAL OUTPATIENT SURGERY
End: 2025-08-01
Payer: COMMERCIAL

## 2025-08-01 ENCOUNTER — HOSPITAL ENCOUNTER (OUTPATIENT)
Facility: SURGERY CENTER | Age: 60
Setting detail: HOSPITAL OUTPATIENT SURGERY
Discharge: HOME | End: 2025-08-01
Attending: ANESTHESIOLOGY | Admitting: ANESTHESIOLOGY
Payer: COMMERCIAL

## 2025-08-01 VITALS
DIASTOLIC BLOOD PRESSURE: 95 MMHG | OXYGEN SATURATION: 99 % | RESPIRATION RATE: 18 BRPM | SYSTOLIC BLOOD PRESSURE: 141 MMHG | HEIGHT: 62 IN | WEIGHT: 140 LBS | TEMPERATURE: 98.2 F | HEART RATE: 76 BPM | BODY MASS INDEX: 25.76 KG/M2

## 2025-08-01 DIAGNOSIS — M54.16 LUMBAR RADICULOPATHY: ICD-10-CM

## 2025-08-01 DIAGNOSIS — Z41.9 SURGERY, ELECTIVE: ICD-10-CM

## 2025-08-01 PROCEDURE — 77002 NEEDLE LOCALIZATION BY XRAY: CPT

## 2025-08-01 PROCEDURE — 25010000002 LIDOCAINE PF 1% 1 % SOLUTION 5 ML VIAL: Performed by: ANESTHESIOLOGY

## 2025-08-01 PROCEDURE — 76000 FLUOROSCOPY <1 HR PHYS/QHP: CPT

## 2025-08-01 PROCEDURE — 25510000001 IOPAMIDOL 61 % SOLUTION 30 ML VIAL: Performed by: ANESTHESIOLOGY

## 2025-08-01 PROCEDURE — 64484 NJX AA&/STRD TFRM EPI L/S EA: CPT | Performed by: ANESTHESIOLOGY

## 2025-08-01 PROCEDURE — 64483 NJX AA&/STRD TFRM EPI L/S 1: CPT | Performed by: ANESTHESIOLOGY

## 2025-08-01 PROCEDURE — 25010000002 BUPIVACAINE (PF) 0.25 % SOLUTION 10 ML VIAL: Performed by: ANESTHESIOLOGY

## 2025-08-01 PROCEDURE — 25010000002 DEXAMETHASONE SODIUM PHOSPHATE 100 MG/10ML SOLUTION 10 ML VIAL: Performed by: ANESTHESIOLOGY

## 2025-08-01 RX ORDER — DIAZEPAM 5 MG/1
10 TABLET ORAL ONCE
Status: COMPLETED | OUTPATIENT
Start: 2025-08-01 | End: 2025-08-01

## 2025-08-01 RX ADMIN — DIAZEPAM 10 MG: 5 TABLET ORAL at 09:50

## 2025-08-01 NOTE — OP NOTE
Lumbar Transforaminal Epidural Steroid Injection Left L4-5 and Left L5-S1 Levels  [facility]    PREOPERATIVE DIAGNOSIS:  Lumbar radicular pain, Lumbar Radiculopathy    POSTOPERATIVE DIAGNOSIS:  Same as preop diagnosis    PROCEDURE:    1. CPT 79370 --  Diagnostic & Therapeutic Transforaminal Epidural Steroid Injection at the L4 level, on the left   2. CPT 81484 --  Diagnostic & Therapeutic Transforaminal Epidural Steroid Injection at the L5 level, on the left     PRE-PROCEDURE DISCUSSION WITH PATIENT:    Risks and complications were discussed with the patient prior to starting the procedure and informed consent was obtained.  We discussed various topics including but not limited to bleeding, infection, injury, nerve injury, paralysis, coma, death, postprocedural painful flare-up, postprocedural site soreness, and a lack of pain relief.  We discussed the diagnostic aspect of transforaminal epidural / selective nerve root blockade.    SURGEON:  Donna Mcfarland MD    SEDATION:  Anxiolysis was used for this procedure which included PO Valium 10mg  ANESTHETIC:  0.25% bupivacaine  STEROID:  10mg dexamethasone    DESCRIPTON OF PROCEDURE:  After obtaining informed consent, an I.V. was not started in the preoperative area. The patient taken to the operating room and was placed in the prone position with a pillow under the abdomen.  All pressure points were well padded.  Heart rate, blood pressure, and pulse oximeter were monitored.  The lumbar area was prepped with Chloraprep and draped in a sterile fashion.     The AP fluoroscopic image was used to visualize the L4 vertebral body.  The superior endplate was squared off radiographically.  The image was then obliqued towards the patient's left side to maximize visualization of the pedicle. The skin and subcutaneous tissue at the 6 o'clock position of the pedicle was anesthetized with 1% lidocaine. A 22-gauge spinal needle was then advanced percutaneously  through the anesthetized skin tract under fluoroscopic guidance in AP and lateral views until the needle tip lie in the alejandro-lateral aspect of the epidural space.  After negative aspiration of the needle for blood or CSF, a volume of 1 mL of Isovue was injected producing good epidural spread with no evidence of loculation, vascular run-off, or intrathecal spread. Subsequently, a volume of 3 mL of injectate was administered without resistance.  The needle was removed intact.  Vital signs were stable throughout.      The same procedure was then performed at the left L5-S1 foramen in the exact same fashion.      The total volume injected consisted of 10 mg of dexamethasone with 1 mL of 0.25% bupivacaine and preservative-free normal saline.       ESTIMATED BLOOD LOSS:  <5 mL  SPECIMENS:  none    COMPLICATIONS:   No complications were noted., There was no indication of vascular uptake on live injection of contrast dye., and There was no indication of intrathecal uptake on live injection of contrast dye.    TOLERANCE & DISCHARGE CONDITION:    The patient tolerated the procedure well.  The patient was transported to the recovery area without difficulties.  The patient was discharged to home under the care of family in stable and satisfactory condition.    PLAN OF CARE:  The patient was given our standard instruction sheet.  The patient will Return to clinic 4-6 wks.  The patient will resume all medications as per the medication reconciliation sheet.

## (undated) DEVICE — INTENDED FOR TISSUE SEPARATION, AND OTHER PROCEDURES THAT REQUIRE A SHARP SURGICAL BLADE TO PUNCTURE OR CUT.: Brand: BARD-PARKER ® CARBON RIB-BACK BLADES

## (undated) DEVICE — Device: Brand: PORTEX

## (undated) DEVICE — CATH IV INTROCAN SFTY PUR 22G 1IN

## (undated) DEVICE — ELECTRD NDL TUNG/MICRO STR 2CM

## (undated) DEVICE — GLV SURG TRIUMPH CLASSIC PF LTX 8.5 STRL

## (undated) DEVICE — IRRIGATOR BULB ASEPTO 60CC STRL

## (undated) DEVICE — PK SHLDR OPN 40

## (undated) DEVICE — TOWEL,OR,DSP,ST,BLUE,STD,4/PK,20PK/CS: Brand: MEDLINE

## (undated) DEVICE — MARKR SKIN W/RULR ULTRAFINETP

## (undated) DEVICE — DRSNG SURESITE WNDW 4X4.5

## (undated) DEVICE — SUT MNCRYL 5/0 P3 18 IN Y493G

## (undated) DEVICE — BNDG ELAS CO-FLEX SLF ADHR 4IN5YD LF STRL

## (undated) DEVICE — ANTIBACTERIAL UNDYED BRAIDED (POLYGLACTIN 910), SYNTHETIC ABSORBABLE SUTURE: Brand: COATED VICRYL

## (undated) DEVICE — ELECTRD NDL EZ CLN MOD 2.75IN

## (undated) DEVICE — CONTAINER,SPECIMEN,OR STERILE,4OZ: Brand: MEDLINE

## (undated) DEVICE — SOL OPTHALMIC BETADINE 5P 30ML STRL

## (undated) DEVICE — ST TRANSFR FLUID MULTIAD SYS

## (undated) DEVICE — SUT VIC 3/0 TIES 18IN J110T

## (undated) DEVICE — SPEAR EYE WECKCEL CELLULOSE LT/BLU PK/6

## (undated) DEVICE — COVER,MAYO STAND,STERILE: Brand: MEDLINE

## (undated) DEVICE — NDL SPINE 22G 31/2IN BLK

## (undated) DEVICE — STPLR SKIN VISISTAT WD 35CT

## (undated) DEVICE — EPIDURAL TRAY: Brand: MEDLINE INDUSTRIES, INC.

## (undated) DEVICE — SPNG LAP 18X18IN LF STRL PK/5

## (undated) DEVICE — EXTRCT STPL SKIN STRL BX/12

## (undated) DEVICE — SUT MNCRYL 3/0 PS2 18IN MCP497G

## (undated) DEVICE — PRECISION THIN (9.0 X 0.38 X 25.0MM)

## (undated) DEVICE — STERILE COTTON TIP 6IN 10PK: Brand: MEDLINE

## (undated) DEVICE — GAUZE,SPONGE,4"X4",16PLY,XRAY,STRL,LF: Brand: MEDLINE

## (undated) DEVICE — CROUCH CORNEAL PROTECTOR: Brand: BAUSCH + LOMB

## (undated) DEVICE — 3M™ STERI-STRIP™ REINFORCED ADHESIVE SKIN CLOSURES, R1547, 1/2 IN X 4 IN (12 MM X 100 MM), 6 STRIPS/ENVELOPE: Brand: 3M™ STERI-STRIP™

## (undated) DEVICE — BANDAGE,GAUZE,BULKEE II,4.5"X4.1YD,STRL: Brand: MEDLINE

## (undated) DEVICE — NDL HYPO PRECISIONGLIDE/REG 25G 5/8 BLU

## (undated) DEVICE — COMB 7IN BLK STRL

## (undated) DEVICE — GLV SURG SENSICARE MICRO PF LF 8.5 STRL

## (undated) DEVICE — DRAPE,SHOULDER,BEACH ULTRAGARD: Brand: MEDLINE

## (undated) DEVICE — ANTI-FOG SOLUTION WITH FOAM PAD: Brand: DEVON

## (undated) DEVICE — SKIN AFFIX SURG ADHESIVE 72/CS 0.55ML: Brand: MEDLINE

## (undated) DEVICE — GLV SURG TRIUMPH PF LTX 7 STRL

## (undated) DEVICE — GLV SURG BIOGEL LTX PF 7

## (undated) DEVICE — DRSNG GZ PETROLTM XEROFORM CURAD 1X8IN STRL

## (undated) DEVICE — GLV SURG SENSICARE PI PF LF 7 GRN STRL

## (undated) DEVICE — PURE ULTRA WHITE PETROLEUM JELLY: Brand: VASELINE

## (undated) DEVICE — SUT VIC 3/0 PS2 27IN J427H

## (undated) DEVICE — SUT SILK 5/0 P3 18IN 640G

## (undated) DEVICE — PENCL E/S HNDSWCH ROCKR CB

## (undated) DEVICE — PREP SOL POVIDONE/IODINE BT 4OZ

## (undated) DEVICE — EYE PAD,OVAL: Brand: CURITY

## (undated) DEVICE — Device

## (undated) DEVICE — GLV SURG BIOGEL LTX PF 8

## (undated) DEVICE — SYR LUERLOK 20CC

## (undated) DEVICE — DRAPE,REIN 53X77,STERILE: Brand: MEDLINE

## (undated) DEVICE — SKIN PREP TRAY W/CHG: Brand: MEDLINE INDUSTRIES, INC.

## (undated) DEVICE — BNDG ELAS ELITE V/CLOSE 4IN 5YD LF STRL

## (undated) DEVICE — NDL HYPO PRECISIONGLIDE/REG 30G 1/2 BRN

## (undated) DEVICE — INTENT TO BE USED WITH SUTURE MATERIAL FOR TISSUE CLOSURE: Brand: RICHARD-ALLAN® NEEDLE STRAIGHT CUTTING

## (undated) DEVICE — SPNG GZ WOVN 4X4IN 12PLY 10/BX STRL

## (undated) DEVICE — SUT NONABS MAXBRAID/PE NMBR2 HC5 38IN BLU 900334
Type: IMPLANTABLE DEVICE | Site: SHOULDER | Status: NON-FUNCTIONAL
Removed: 2020-02-25

## (undated) DEVICE — GLV SURG SENSICARE PI MIC PF SZ6.5 LF STRL

## (undated) DEVICE — MAT FLR ABSORBENT LG 4FT 10 2.5FT

## (undated) DEVICE — DRAPE,U/ SHT,SPLIT,PLAS,STERIL: Brand: MEDLINE

## (undated) DEVICE — DUAL CUT SAGITTAL BLADE

## (undated) DEVICE — SYR LUERLOK 30CC

## (undated) DEVICE — NEEDLE, QUINCKE, 22GX5": Brand: MEDLINE

## (undated) DEVICE — SYR INSULIN LUERLOK 1CC

## (undated) DEVICE — SYR LUERLOK 50ML

## (undated) DEVICE — GLV SURG SIGNATURE ESSENTIAL PF LTX SZ7.5

## (undated) DEVICE — NDL HYPO PRECISIONGLIDE/REG 18G 11/2 PNK

## (undated) DEVICE — TRY SKINPREP DRYPREP

## (undated) DEVICE — PK ENT MINOR 40

## (undated) DEVICE — SUT PROLN 5/0 P3 18IN 8698G

## (undated) DEVICE — ARM SLING: Brand: DEROYAL

## (undated) DEVICE — SOL BETADINE 4OZ

## (undated) DEVICE — SHIELD CORNL PROTECT CLR UNIV

## (undated) DEVICE — PROXIMATE RH ROTATING HEAD SKIN STAPLERS (35 WIDE) CONTAINS 35 STAINLESS STEEL STAPLES: Brand: PROXIMATE